# Patient Record
Sex: FEMALE | Race: WHITE | NOT HISPANIC OR LATINO | Employment: UNEMPLOYED | URBAN - METROPOLITAN AREA
[De-identification: names, ages, dates, MRNs, and addresses within clinical notes are randomized per-mention and may not be internally consistent; named-entity substitution may affect disease eponyms.]

---

## 2017-12-21 ENCOUNTER — HOSPITAL ENCOUNTER (EMERGENCY)
Facility: HOSPITAL | Age: 42
Discharge: HOME | End: 2017-12-21
Attending: EMERGENCY MEDICINE | Admitting: EMERGENCY MEDICINE

## 2017-12-21 ENCOUNTER — APPOINTMENT (OUTPATIENT)
Dept: GENERAL RADIOLOGY | Facility: HOSPITAL | Age: 42
End: 2017-12-21

## 2017-12-21 ENCOUNTER — APPOINTMENT (OUTPATIENT)
Dept: CT IMAGING | Facility: HOSPITAL | Age: 42
End: 2017-12-21

## 2017-12-21 VITALS
TEMPERATURE: 100.4 F | BODY MASS INDEX: 18.48 KG/M2 | OXYGEN SATURATION: 100 % | HEIGHT: 66 IN | HEART RATE: 74 BPM | SYSTOLIC BLOOD PRESSURE: 124 MMHG | RESPIRATION RATE: 16 BRPM | WEIGHT: 115 LBS | DIASTOLIC BLOOD PRESSURE: 78 MMHG

## 2017-12-21 DIAGNOSIS — Z85.038 H/O COLON CANCER, STAGE IV: ICD-10-CM

## 2017-12-21 DIAGNOSIS — R50.9 FEBRILE ILLNESS: ICD-10-CM

## 2017-12-21 DIAGNOSIS — R10.30 LOWER ABDOMINAL PAIN: Primary | ICD-10-CM

## 2017-12-21 LAB
ALBUMIN SERPL-MCNC: 4.4 G/DL (ref 3.5–5.2)
ALBUMIN/GLOB SERPL: 1.2 G/DL
ALP SERPL-CCNC: 61 U/L (ref 39–117)
ALT SERPL W P-5'-P-CCNC: 9 U/L (ref 1–33)
ANION GAP SERPL CALCULATED.3IONS-SCNC: 12.2 MMOL/L
AST SERPL-CCNC: 17 U/L (ref 1–32)
BASOPHILS # BLD AUTO: 0.01 10*3/MM3 (ref 0–0.2)
BASOPHILS NFR BLD AUTO: 0.1 % (ref 0–1.5)
BILIRUB SERPL-MCNC: 0.7 MG/DL (ref 0.1–1.2)
BILIRUB UR QL STRIP: NEGATIVE
BUN BLD-MCNC: 14 MG/DL (ref 6–20)
BUN/CREAT SERPL: 20 (ref 7–25)
CALCIUM SPEC-SCNC: 9.6 MG/DL (ref 8.6–10.5)
CHLORIDE SERPL-SCNC: 101 MMOL/L (ref 98–107)
CLARITY UR: CLEAR
CO2 SERPL-SCNC: 24.8 MMOL/L (ref 22–29)
COLOR UR: YELLOW
CREAT BLD-MCNC: 0.7 MG/DL (ref 0.57–1)
D-LACTATE SERPL-SCNC: 1.3 MMOL/L (ref 0.5–2)
DEPRECATED RDW RBC AUTO: 57.9 FL (ref 37–54)
EOSINOPHIL # BLD AUTO: 0.07 10*3/MM3 (ref 0–0.7)
EOSINOPHIL NFR BLD AUTO: 0.8 % (ref 0.3–6.2)
ERYTHROCYTE [DISTWIDTH] IN BLOOD BY AUTOMATED COUNT: 14.6 % (ref 11.7–13)
FLUAV AG NPH QL: NEGATIVE
FLUBV AG NPH QL IA: NEGATIVE
GFR SERPL CREATININE-BSD FRML MDRD: 93 ML/MIN/1.73
GLOBULIN UR ELPH-MCNC: 3.8 GM/DL
GLUCOSE BLD-MCNC: 111 MG/DL (ref 65–99)
GLUCOSE UR STRIP-MCNC: NEGATIVE MG/DL
HCT VFR BLD AUTO: 42.6 % (ref 35.6–45.5)
HGB BLD-MCNC: 14.5 G/DL (ref 11.9–15.5)
HGB UR QL STRIP.AUTO: NEGATIVE
IMM GRANULOCYTES # BLD: 0.04 10*3/MM3 (ref 0–0.03)
IMM GRANULOCYTES NFR BLD: 0.4 % (ref 0–0.5)
KETONES UR QL STRIP: NEGATIVE
LEUKOCYTE ESTERASE UR QL STRIP.AUTO: NEGATIVE
LYMPHOCYTES # BLD AUTO: 0.75 10*3/MM3 (ref 0.9–4.8)
LYMPHOCYTES NFR BLD AUTO: 8.4 % (ref 19.6–45.3)
MACROCYTES BLD QL SMEAR: NORMAL
MCH RBC QN AUTO: 37.1 PG (ref 26.9–32)
MCHC RBC AUTO-ENTMCNC: 34 G/DL (ref 32.4–36.3)
MCV RBC AUTO: 109 FL (ref 80.5–98.2)
MONOCYTES # BLD AUTO: 0.64 10*3/MM3 (ref 0.2–1.2)
MONOCYTES NFR BLD AUTO: 7.1 % (ref 5–12)
NEUTROPHILS # BLD AUTO: 7.46 10*3/MM3 (ref 1.9–8.1)
NEUTROPHILS NFR BLD AUTO: 83.2 % (ref 42.7–76)
NITRITE UR QL STRIP: NEGATIVE
PH UR STRIP.AUTO: 7 [PH] (ref 5–8)
PLAT MORPH BLD: NORMAL
PLATELET # BLD AUTO: 149 10*3/MM3 (ref 140–500)
PMV BLD AUTO: 10.6 FL (ref 6–12)
POTASSIUM BLD-SCNC: 3.5 MMOL/L (ref 3.5–5.2)
PROCALCITONIN SERPL-MCNC: 0.06 NG/ML (ref 0.1–0.25)
PROT SERPL-MCNC: 8.2 G/DL (ref 6–8.5)
PROT UR QL STRIP: NEGATIVE
RBC # BLD AUTO: 3.91 10*6/MM3 (ref 3.9–5.2)
SODIUM BLD-SCNC: 138 MMOL/L (ref 136–145)
SP GR UR STRIP: >=1.03 (ref 1–1.03)
UROBILINOGEN UR QL STRIP: NORMAL
WBC MORPH BLD: NORMAL
WBC NRBC COR # BLD: 8.97 10*3/MM3 (ref 4.5–10.7)

## 2017-12-21 PROCEDURE — 0 IOPAMIDOL 61 % SOLUTION: Performed by: EMERGENCY MEDICINE

## 2017-12-21 PROCEDURE — 85007 BL SMEAR W/DIFF WBC COUNT: CPT | Performed by: EMERGENCY MEDICINE

## 2017-12-21 PROCEDURE — 87040 BLOOD CULTURE FOR BACTERIA: CPT | Performed by: EMERGENCY MEDICINE

## 2017-12-21 PROCEDURE — 87804 INFLUENZA ASSAY W/OPTIC: CPT | Performed by: EMERGENCY MEDICINE

## 2017-12-21 PROCEDURE — 25010000002 HEPARIN FLUSH (PORCINE) 100 UNIT/ML SOLUTION

## 2017-12-21 PROCEDURE — 85025 COMPLETE CBC W/AUTO DIFF WBC: CPT | Performed by: EMERGENCY MEDICINE

## 2017-12-21 PROCEDURE — 81003 URINALYSIS AUTO W/O SCOPE: CPT | Performed by: EMERGENCY MEDICINE

## 2017-12-21 PROCEDURE — 74177 CT ABD & PELVIS W/CONTRAST: CPT

## 2017-12-21 PROCEDURE — 96361 HYDRATE IV INFUSION ADD-ON: CPT

## 2017-12-21 PROCEDURE — 84145 PROCALCITONIN (PCT): CPT | Performed by: EMERGENCY MEDICINE

## 2017-12-21 PROCEDURE — 83605 ASSAY OF LACTIC ACID: CPT | Performed by: EMERGENCY MEDICINE

## 2017-12-21 PROCEDURE — 71020 HC CHEST PA AND LATERAL: CPT

## 2017-12-21 PROCEDURE — 80053 COMPREHEN METABOLIC PANEL: CPT | Performed by: EMERGENCY MEDICINE

## 2017-12-21 PROCEDURE — 99284 EMERGENCY DEPT VISIT MOD MDM: CPT

## 2017-12-21 PROCEDURE — 96360 HYDRATION IV INFUSION INIT: CPT

## 2017-12-21 RX ORDER — HYDROCODONE BITARTRATE AND ACETAMINOPHEN 5; 325 MG/1; MG/1
1 TABLET ORAL EVERY 6 HOURS PRN
Qty: 15 TABLET | Refills: 0 | Status: SHIPPED | OUTPATIENT
Start: 2017-12-21 | End: 2019-03-29 | Stop reason: HOSPADM

## 2017-12-21 RX ORDER — SODIUM CHLORIDE 0.9 % (FLUSH) 0.9 %
10 SYRINGE (ML) INJECTION AS NEEDED
Status: DISCONTINUED | OUTPATIENT
Start: 2017-12-21 | End: 2017-12-22 | Stop reason: HOSPADM

## 2017-12-21 RX ADMIN — SODIUM CHLORIDE 1000 ML: 9 INJECTION, SOLUTION INTRAVENOUS at 19:57

## 2017-12-21 RX ADMIN — IOPAMIDOL 85 ML: 612 INJECTION, SOLUTION INTRAVENOUS at 20:43

## 2017-12-21 RX ADMIN — Medication 500 UNITS: at 23:27

## 2017-12-21 NOTE — ED PROVIDER NOTES
" EMERGENCY DEPARTMENT ENCOUNTER    CHIEF COMPLAINT  Chief Complaint: R Flank pain  History given by: Pt  History limited by: Nothing  Room Number: 15/15  PMD: Jase Warner      HPI:  Pt is a 42 y.o. female,  with known stage 4 Colorectal cancer with mets to lungs and liver, who presents complaining of sharp R flank pain that began in August.  Pt reports a pain level of 10/10 but states that applying ice to the area helps to alleviate the pain.  Pt reports associated diaphoresis, nausea, superpubic abdominal pain (crampy)  Pt denies urinary frequency, urinary urgency, diarrhea, rashes.  Pt is febrile, during the visit, 100.4.  Pt has had 47 chemo treatments with last one on last Friday.  Oncologist is Dr. Garvey at [facility].   Pt has a power port.    Duration:  4 months  Onset: Gradual  Timing: Interittent  Location: R flank  Radiation: None  Quality: \"Sharp\"  Intensity/Severity: Moderate  Progression: Unchanged  Associated Symptoms: Pt reports associated diaphoresis, nausea, superpubic abdominal pain (crampy)   Aggravating Factors: None  Alleviating Factors: Ice to the area  Previous Episodes: None  Treatment before arrival: None    PAST MEDICAL HISTORY  Active Ambulatory Problems     Diagnosis Date Noted   • No Active Ambulatory Problems     Resolved Ambulatory Problems     Diagnosis Date Noted   • No Resolved Ambulatory Problems     Past Medical History:   Diagnosis Date   • Cancer        PAST SURGICAL HISTORY  Past Surgical History:   Procedure Laterality Date   • COLON RESECTION      COLORECTAL RESECTION       FAMILY HISTORY  History reviewed. No pertinent family history.    SOCIAL HISTORY  Social History     Social History   • Marital status:      Spouse name: N/A   • Number of children: N/A   • Years of education: N/A     Occupational History   • Not on file.     Social History Main Topics   • Smoking status: Never Smoker   • Smokeless tobacco: Not on file   • Alcohol use No   • Drug use: No   • " Sexual activity: Not on file     Other Topics Concern   • Not on file     Social History Narrative   • No narrative on file       ALLERGIES  Review of patient's allergies indicates no known allergies.    REVIEW OF SYSTEMS  Review of Systems   Constitutional: Positive for diaphoresis. Negative for fever.   HENT: Negative for sore throat.    Eyes: Negative.    Respiratory: Negative for cough and shortness of breath.    Cardiovascular: Negative for chest pain.   Gastrointestinal: Positive for abdominal pain and nausea. Negative for diarrhea and vomiting.   Genitourinary: Negative for dysuria.   Musculoskeletal: Negative for neck pain.   Skin: Negative for rash.   Allergic/Immunologic: Negative.    Neurological: Negative for weakness, numbness and headaches.   Hematological: Negative.    Psychiatric/Behavioral: Negative.    All other systems reviewed and are negative.      PHYSICAL EXAM  ED Triage Vitals   Temp Heart Rate Resp BP SpO2   12/21/17 1727 12/21/17 1727 12/21/17 1727 12/21/17 1727 12/21/17 1727   100.4 °F (38 °C) 77 16 107/76 100 %      Temp src Heart Rate Source Patient Position BP Location FiO2 (%)   12/21/17 1727 -- -- -- --   Tympanic           Physical Exam   Constitutional: She is oriented to person, place, and time and well-developed, well-nourished, and in no distress. No distress.   HENT:   Head: Normocephalic and atraumatic.   Mouth/Throat: Mucous membranes are normal.   Eyes: EOM are normal. Pupils are equal, round, and reactive to light.   Neck: Normal range of motion. Neck supple.   Cardiovascular: Normal rate, regular rhythm and normal heart sounds.    No murmur heard.  Pulmonary/Chest: Effort normal and breath sounds normal. No respiratory distress.   Abdominal: Soft. Bowel sounds are normal. There is tenderness in the suprapubic area. There is CVA tenderness (right). There is no rebound.   Vertical midline scar.     Musculoskeletal: Normal range of motion. She exhibits no edema.   Mild  tenderness of right lower back but not the spine.     Neurological: She is alert and oriented to person, place, and time. She has normal sensation and normal strength.   Skin: Skin is warm and dry. No rash noted.   Psychiatric: Mood and affect normal.   Nursing note and vitals reviewed.      LAB RESULTS  Lab Results (last 24 hours)     Procedure Component Value Units Date/Time    CBC & Differential [[id number]] Collected:  12/21/17 1857    Specimen:  Blood Updated:  12/21/17 1943    Narrative:       The following orders were created for panel order CBC & Differential.  Procedure                               Abnormality         Status                     ---------                               -----------         ------                     Scan Slide[[id number]]                                       Final result               CBC Auto Differential[[id number]]        Abnormal            Final result                 Please view results for these tests on the individual orders.    Comprehensive Metabolic Panel [[id number]]  (Abnormal) Collected:  12/21/17 1857    Specimen:  Blood Updated:  12/21/17 1942     Glucose 111 (H) mg/dL      BUN 14 mg/dL      Creatinine 0.70 mg/dL      Sodium 138 mmol/L      Potassium 3.5 mmol/L      Chloride 101 mmol/L      CO2 24.8 mmol/L      Calcium 9.6 mg/dL      Total Protein 8.2 g/dL      Albumin 4.40 g/dL      ALT (SGPT) 9 U/L      AST (SGOT) 17 U/L      Alkaline Phosphatase 61 U/L      Total Bilirubin 0.7 mg/dL      eGFR Non African Amer 93 mL/min/1.73      Globulin 3.8 gm/dL      A/G Ratio 1.2 g/dL      BUN/Creatinine Ratio 20.0     Anion Gap 12.2 mmol/L     Lactic Acid, Plasma [[id number]]  (Normal) Collected:  12/21/17 1857    Specimen:  Blood Updated:  12/21/17 1931     Lactate 1.3 mmol/L     Blood Culture - Blood, [[id number]] Collected:  12/21/17 1857    Specimen:  Blood from Arm, Right Updated:  12/21/17 1914    Procalcitonin [[id number]]  (Abnormal) Collected:  12/21/17 1857     "Specimen:  Blood Updated:  12/21/17 1949     Procalcitonin 0.06 (L) ng/mL     Narrative:       As a Marker for Sepsis (Non-Neonates):   1. <0.5 ng/mL represents a low risk of severe sepsis and/or septic shock.  1. >2 ng/mL represents a high risk of severe sepsis and/or septic shock.    As a Marker for Lower Respiratory Tract Infections that require antibiotic therapy:  PCT on Admission     Antibiotic Therapy             6-12 Hrs later  > 0.5                Strongly Recommended            >0.25 - <0.5         Recommended  0.1 - 0.25           Discouraged                   Remeasure/reassess PCT  <0.1                 Strongly Discouraged          Remeasure/reassess PCT      As 28 day mortality risk marker: \"Change in Procalcitonin Result\" (> 80 % or <=80 %) if Day 0 (or Day 1) and Day 4 values are available. Refer to http://www.oLyfepct-calculator.com/   Change in PCT <=80 %   A decrease of PCT levels below or equal to 80 % defines a positive change in PCT test result representing a higher risk for 28-day all-cause mortality of patients diagnosed with severe sepsis or septic shock.  Change in PCT > 80 %   A decrease of PCT levels of more than 80 % defines a negative change in PCT result representing a lower risk for 28-day all-cause mortality of patients diagnosed with severe sepsis or septic shock.                CBC Auto Differential [[id number]]  (Abnormal) Collected:  12/21/17 1857    Specimen:  Blood Updated:  12/21/17 1943     WBC 8.97 10*3/mm3      RBC 3.91 10*6/mm3      Hemoglobin 14.5 g/dL      Hematocrit 42.6 %      .0 (H) fL      MCH 37.1 (H) pg      MCHC 34.0 g/dL      RDW 14.6 (H) %      RDW-SD 57.9 (H) fl      MPV 10.6 fL      Platelets 149 10*3/mm3      Neutrophil % 83.2 (H) %      Lymphocyte % 8.4 (L) %      Monocyte % 7.1 %      Eosinophil % 0.8 %      Basophil % 0.1 %      Immature Grans % 0.4 %      Neutrophils, Absolute 7.46 10*3/mm3      Lymphocytes, Absolute 0.75 (L) 10*3/mm3      " Monocytes, Absolute 0.64 10*3/mm3      Eosinophils, Absolute 0.07 10*3/mm3      Basophils, Absolute 0.01 10*3/mm3      Immature Grans, Absolute 0.04 (H) 10*3/mm3     Scan Slide [[id number]] Collected:  12/21/17 1857    Specimen:  Blood Updated:  12/21/17 1943     Macrocytes Mod/2+     WBC Morphology Normal     Platelet Morphology Normal    Blood Culture - Blood, [[id number]] Collected:  12/21/17 2001    Specimen:  Blood from Arm, Left Updated:  12/21/17 2022    Urinalysis With / Culture If Indicated - Urine, Clean Catch [[id number]]  (Normal) Collected:  12/21/17 2210    Specimen:  Urine from Urine, Clean Catch Updated:  12/21/17 2228     Color, UA Yellow     Appearance, UA Clear     pH, UA 7.0     Specific Gravity, UA >=1.030     Glucose, UA Negative     Ketones, UA Negative     Bilirubin, UA Negative     Blood, UA Negative     Protein, UA Negative     Leuk Esterase, UA Negative     Nitrite, UA Negative     Urobilinogen, UA 0.2 E.U./dL    Narrative:       Urine microscopic not indicated.    Influenza Antigen, Rapid - Swab, Nasopharynx [[id number]]  (Normal) Collected:  12/21/17 2213    Specimen:  Swab from Nasopharynx Updated:  12/21/17 2250     Influenza A Ag, EIA Negative     Influenza B Ag, EIA Negative          I ordered the above labs and reviewed the results    RADIOLOGY  CT Abdomen Pelvis With Contrast   Preliminary Result   1.  No definite acute abdominal pathology, no obstructive uropathy or   inflammatory bowel disease.    2.  Small amount of fluid in the pelvis with enhancing walls,   differential diagnoses includes an abscess or neoplastic process given   patient's history of colon cancer.   3.  Numerous spiculated nodules in both lung bases in keeping with known   metastatic disease to the lungs. Infiltrates are possible but appear   less likely.              XR Chest 2 View   Final Result       PA AND LATERAL CHEST     CLINICAL HISTORY: 42-year-old female with history of widely metastatic  colon carcinoma  involving the liver and lungs. Dyspnea. Fever.     No previous chest imaging is available for comparison.     There are innumerable ill-defined nodular opacities throughout both  lungs consistent with extensive metastatic disease. Patchy superimposed  pneumonia cannot be excluded without a previous chest x-ray to compare.  No focal areas of dense consolidation are identified. There are no  pleural effusions. The cardiomediastinal silhouette is unremarkable. A  Mediport device is in place in satisfactory position within the right  internal jugular vein.     This report was finalized on 12/21/2017 6:41 PM by Dr. Efrain Pineda MD.    I ordered the above noted radiological studies. Interpreted by radiologist. Reviewed by me in PACS.       PROCEDURES  Procedures      PROGRESS AND CONSULTS  ED Course   1814  Ordered blood work, and Urinalysis with culture for further evaluation.      1907   Ordered CT abdomen/ pelvis with contrast for further evaluation.     2202  Rechecked the patient and she is resting comfortably. Discussed pertinent lab and imaging results.. Discussed the plan to assess for UTI.     2300  Discussed the plan to discharge with pain medication.  Instructed Pt to follow up with oncologist. Patient agrees with plan and all questions were addressed.         MEDICAL DECISION MAKING  Results were reviewed/discussed with the patient and they were also made aware of online access. Pt also made aware that some labs, such as cultures, will not be resulted during ER visit and follow up with PMD is necessary.     MDM  Number of Diagnoses or Management Options  Febrile illness:   H/O colon cancer, stage IV:   Lower abdominal pain:      Amount and/or Complexity of Data Reviewed  Clinical lab tests: ordered and reviewed (Pt is negative for Influenza)  Tests in the radiology section of CPT®: ordered and reviewed (CT shows nothing acute.  XR shows nothing acute. )    Patient Progress  Patient progress: stable          DIAGNOSIS  Final diagnoses:   Lower abdominal pain   H/O colon cancer, stage IV   Febrile illness       DISPOSITION  DISCHARGE    Patient discharged in stable condition.    Reviewed implications of results, diagnosis, meds, responsibility to follow up, warning signs and symptoms of possible worsening, potential complications and reasons to return to ER.    Patient/Family voiced understanding of above instructions.    Discussed plan for discharge, as there is no emergent indication for admission.  Pt/family is agreeable and understands need for follow up and repeat testing.  Pt is aware that discharge does not mean that nothing is wrong but it indicates no emergency is present that requires admission and they must continue care with follow-up as given below or physician of their choice.     FOLLOW-UP  Jase Warner  [address] [facility] [address]  [phone]    Schedule an appointment as soon as possible for a visit           Medication List      New Prescriptions          HYDROcodone-acetaminophen 5-325 MG per tablet   Commonly known as:  NORCO   Take 1 tablet by mouth Every 6 (Six) Hours As Needed for Moderate Pain .               Latest Documented Vital Signs:  As of 11:54 PM  BP- 124/78 HR- 74 Temp- 100.4 °F (38 °C) (Tympanic) O2 sat- 100%    --  Documentation assistance provided by luis e Gillette for Dr. Ryan.  Information recorded by the scribe was done at my direction and has been verified and validated by me.             Lesia Gillette  12/21/17 [id number]       Ruddy Ryan MD  12/21/17 9781

## 2017-12-22 NOTE — DISCHARGE INSTRUCTIONS
Use over the counter Tylenol or Ibuprofen as needed for pain.  Use Norco for breakthrough pain.  Do follow up with your oncologist.  Return to the ED if symptoms worsen with fever

## 2017-12-26 LAB
BACTERIA SPEC AEROBE CULT: NORMAL
BACTERIA SPEC AEROBE CULT: NORMAL

## 2018-05-03 ENCOUNTER — HOSPITAL ENCOUNTER (EMERGENCY)
Facility: HOSPITAL | Age: 43
Discharge: HOME | End: 2018-05-04
Attending: EMERGENCY MEDICINE | Admitting: EMERGENCY MEDICINE

## 2018-05-03 DIAGNOSIS — R11.2 NON-INTRACTABLE VOMITING WITH NAUSEA, UNSPECIFIED VOMITING TYPE: ICD-10-CM

## 2018-05-03 DIAGNOSIS — C18.9 METASTATIC COLON CANCER IN FEMALE: ICD-10-CM

## 2018-05-03 DIAGNOSIS — M54.50 ACUTE MIDLINE LOW BACK PAIN WITHOUT SCIATICA: Primary | ICD-10-CM

## 2018-05-03 PROCEDURE — 99284 EMERGENCY DEPT VISIT MOD MDM: CPT

## 2018-05-04 ENCOUNTER — APPOINTMENT (OUTPATIENT)
Dept: CT IMAGING | Facility: HOSPITAL | Age: 43
End: 2018-05-04

## 2018-05-04 VITALS
TEMPERATURE: 98 F | SYSTOLIC BLOOD PRESSURE: 103 MMHG | DIASTOLIC BLOOD PRESSURE: 78 MMHG | BODY MASS INDEX: 17.27 KG/M2 | HEIGHT: 67 IN | RESPIRATION RATE: 16 BRPM | WEIGHT: 110 LBS | OXYGEN SATURATION: 100 % | HEART RATE: 81 BPM

## 2018-05-04 LAB
ALBUMIN SERPL-MCNC: 4.3 G/DL (ref 3.5–5.2)
ALBUMIN/GLOB SERPL: 1.2 G/DL
ALP SERPL-CCNC: 106 U/L (ref 39–117)
ALT SERPL W P-5'-P-CCNC: 11 U/L (ref 1–33)
ANION GAP SERPL CALCULATED.3IONS-SCNC: 17.5 MMOL/L
AST SERPL-CCNC: 21 U/L (ref 1–32)
BACTERIA UR QL AUTO: ABNORMAL /HPF
BASOPHILS # BLD AUTO: 0.03 10*3/MM3 (ref 0–0.2)
BASOPHILS NFR BLD AUTO: 0.2 % (ref 0–1.5)
BILIRUB SERPL-MCNC: 0.9 MG/DL (ref 0.1–1.2)
BILIRUB UR QL STRIP: NEGATIVE
BUN BLD-MCNC: 25 MG/DL (ref 6–20)
BUN/CREAT SERPL: 41 (ref 7–25)
CALCIUM SPEC-SCNC: 9.9 MG/DL (ref 8.6–10.5)
CHLORIDE SERPL-SCNC: 95 MMOL/L (ref 98–107)
CLARITY UR: CLEAR
CO2 SERPL-SCNC: 22.5 MMOL/L (ref 22–29)
COLOR UR: YELLOW
CREAT BLD-MCNC: 0.61 MG/DL (ref 0.57–1)
DEPRECATED RDW RBC AUTO: 45.2 FL (ref 37–54)
EOSINOPHIL # BLD AUTO: 0.04 10*3/MM3 (ref 0–0.7)
EOSINOPHIL NFR BLD AUTO: 0.2 % (ref 0.3–6.2)
ERYTHROCYTE [DISTWIDTH] IN BLOOD BY AUTOMATED COUNT: 12.3 % (ref 11.7–13)
GFR SERPL CREATININE-BSD FRML MDRD: 108 ML/MIN/1.73
GLOBULIN UR ELPH-MCNC: 3.5 GM/DL
GLUCOSE BLD-MCNC: 113 MG/DL (ref 65–99)
GLUCOSE UR STRIP-MCNC: NEGATIVE MG/DL
HCG SERPL QL: NEGATIVE
HCT VFR BLD AUTO: 47.7 % (ref 35.6–45.5)
HGB BLD-MCNC: 16.6 G/DL (ref 11.9–15.5)
HGB UR QL STRIP.AUTO: NEGATIVE
HYALINE CASTS UR QL AUTO: ABNORMAL /LPF
IMM GRANULOCYTES # BLD: 0.05 10*3/MM3 (ref 0–0.03)
IMM GRANULOCYTES NFR BLD: 0.3 % (ref 0–0.5)
KETONES UR QL STRIP: ABNORMAL
LEUKOCYTE ESTERASE UR QL STRIP.AUTO: ABNORMAL
LIPASE SERPL-CCNC: 27 U/L (ref 13–60)
LYMPHOCYTES # BLD AUTO: 1.34 10*3/MM3 (ref 0.9–4.8)
LYMPHOCYTES NFR BLD AUTO: 7.8 % (ref 19.6–45.3)
MCH RBC QN AUTO: 34.8 PG (ref 26.9–32)
MCHC RBC AUTO-ENTMCNC: 34.8 G/DL (ref 32.4–36.3)
MCV RBC AUTO: 100 FL (ref 80.5–98.2)
MONOCYTES # BLD AUTO: 1.01 10*3/MM3 (ref 0.2–1.2)
MONOCYTES NFR BLD AUTO: 5.9 % (ref 5–12)
NEUTROPHILS # BLD AUTO: 14.79 10*3/MM3 (ref 1.9–8.1)
NEUTROPHILS NFR BLD AUTO: 85.9 % (ref 42.7–76)
NITRITE UR QL STRIP: NEGATIVE
PH UR STRIP.AUTO: <=5 [PH] (ref 5–8)
PLATELET # BLD AUTO: 221 10*3/MM3 (ref 140–500)
PMV BLD AUTO: 9.8 FL (ref 6–12)
POTASSIUM BLD-SCNC: 3.9 MMOL/L (ref 3.5–5.2)
PROT SERPL-MCNC: 7.8 G/DL (ref 6–8.5)
PROT UR QL STRIP: ABNORMAL
RBC # BLD AUTO: 4.77 10*6/MM3 (ref 3.9–5.2)
RBC # UR: ABNORMAL /HPF
REF LAB TEST METHOD: ABNORMAL
SODIUM BLD-SCNC: 135 MMOL/L (ref 136–145)
SP GR UR STRIP: >=1.03 (ref 1–1.03)
SQUAMOUS #/AREA URNS HPF: ABNORMAL /HPF
UROBILINOGEN UR QL STRIP: ABNORMAL
WBC NRBC COR # BLD: 17.21 10*3/MM3 (ref 4.5–10.7)
WBC UR QL AUTO: ABNORMAL /HPF
WHOLE BLOOD HOLD SPECIMEN: NORMAL

## 2018-05-04 PROCEDURE — 84703 CHORIONIC GONADOTROPIN ASSAY: CPT | Performed by: EMERGENCY MEDICINE

## 2018-05-04 PROCEDURE — 80053 COMPREHEN METABOLIC PANEL: CPT | Performed by: EMERGENCY MEDICINE

## 2018-05-04 PROCEDURE — 96375 TX/PRO/DX INJ NEW DRUG ADDON: CPT

## 2018-05-04 PROCEDURE — 85025 COMPLETE CBC W/AUTO DIFF WBC: CPT | Performed by: EMERGENCY MEDICINE

## 2018-05-04 PROCEDURE — 25010000002 ONDANSETRON PER 1 MG: Performed by: EMERGENCY MEDICINE

## 2018-05-04 PROCEDURE — 83690 ASSAY OF LIPASE: CPT | Performed by: EMERGENCY MEDICINE

## 2018-05-04 PROCEDURE — 96374 THER/PROPH/DIAG INJ IV PUSH: CPT

## 2018-05-04 PROCEDURE — 25010000002 IOPAMIDOL 61 % SOLUTION: Performed by: EMERGENCY MEDICINE

## 2018-05-04 PROCEDURE — 96361 HYDRATE IV INFUSION ADD-ON: CPT

## 2018-05-04 PROCEDURE — 74177 CT ABD & PELVIS W/CONTRAST: CPT

## 2018-05-04 PROCEDURE — 81001 URINALYSIS AUTO W/SCOPE: CPT | Performed by: EMERGENCY MEDICINE

## 2018-05-04 PROCEDURE — 96376 TX/PRO/DX INJ SAME DRUG ADON: CPT

## 2018-05-04 PROCEDURE — 25010000002 HYDROMORPHONE PER 4 MG: Performed by: EMERGENCY MEDICINE

## 2018-05-04 PROCEDURE — 25010000002 HEPARIN FLUSH (PORCINE) 100 UNIT/ML SOLUTION: Performed by: EMERGENCY MEDICINE

## 2018-05-04 RX ORDER — SODIUM CHLORIDE 0.9 % (FLUSH) 0.9 %
10 SYRINGE (ML) INJECTION AS NEEDED
Status: DISCONTINUED | OUTPATIENT
Start: 2018-05-04 | End: 2018-05-04 | Stop reason: HOSPADM

## 2018-05-04 RX ORDER — HYDROMORPHONE HYDROCHLORIDE 2 MG/1
1 TABLET ORAL EVERY 6 HOURS PRN
Qty: 6 TABLET | Refills: 0 | Status: ON HOLD | OUTPATIENT
Start: 2018-05-04 | End: 2019-06-13

## 2018-05-04 RX ORDER — ONDANSETRON 2 MG/ML
4 INJECTION INTRAMUSCULAR; INTRAVENOUS ONCE
Status: COMPLETED | OUTPATIENT
Start: 2018-05-04 | End: 2018-05-04

## 2018-05-04 RX ORDER — HYDROMORPHONE HCL 110MG/55ML
1 PATIENT CONTROLLED ANALGESIA SYRINGE INTRAVENOUS ONCE
Status: COMPLETED | OUTPATIENT
Start: 2018-05-04 | End: 2018-05-04

## 2018-05-04 RX ORDER — ONDANSETRON 4 MG/1
4 TABLET, FILM COATED ORAL EVERY 6 HOURS PRN
Qty: 12 TABLET | Refills: 0 | Status: ON HOLD | OUTPATIENT
Start: 2018-05-04 | End: 2019-06-13

## 2018-05-04 RX ADMIN — IOPAMIDOL 85 ML: 612 INJECTION, SOLUTION INTRAVENOUS at 01:06

## 2018-05-04 RX ADMIN — HYDROMORPHONE HYDROCHLORIDE 1 MG: 2 INJECTION INTRAMUSCULAR; INTRAVENOUS; SUBCUTANEOUS at 00:30

## 2018-05-04 RX ADMIN — HYDROMORPHONE HYDROCHLORIDE 1 MG: 2 INJECTION INTRAMUSCULAR; INTRAVENOUS; SUBCUTANEOUS at 02:26

## 2018-05-04 RX ADMIN — SODIUM CHLORIDE 1000 ML: 9 INJECTION, SOLUTION INTRAVENOUS at 00:30

## 2018-05-04 RX ADMIN — IOPAMIDOL 1 ML: 612 INJECTION, SOLUTION INTRAVENOUS at 01:43

## 2018-05-04 RX ADMIN — Medication 500 UNITS: at 02:51

## 2018-05-04 RX ADMIN — ONDANSETRON 4 MG: 2 INJECTION INTRAMUSCULAR; INTRAVENOUS at 00:30

## 2018-05-04 NOTE — DISCHARGE INSTRUCTIONS
Talk to your primary care doctor and/or oncologist about long-term pain medication.  Take medications as prescribed.  You may also take ibuprofen 800 mg every 6 hours as needed for pain.  Return to the emergency department for worsening pain, persistent vomiting, fever, or other concern.  Follow-up with your primary care doctor in the next several days.  Do not take hydrocodone while taking Dilaudid/hydromorphone.

## 2018-05-04 NOTE — ED PROVIDER NOTES
" EMERGENCY DEPARTMENT ENCOUNTER    CHIEF COMPLAINT  Chief Complaint: Back pain   History given by: Pt and   History limited by: Nothing  Room Number: 40/40  PMD: Jase Warner      HPI:  Pt is a 43 y.o. female with history of metastatic colorectal cancer, who presents complaining of intermittent lower back pain that rates 10/10 in intensity which began a year ago and worsened last week.   Pt reports associated vomiting secondary to pain that began one week ago and intensified today.  Pt states that she was diagnosed with bulging disks last week via MRI  and received valium and steroids last week.  Pt reports that the pain radiates to both legs but does not reach her feet. Spouse reports that her doctors do not believe that the lower back pain is due to her cancer. Pt denies bowel or bladder problems, numbness or weakness of legs.     Pt was last seen in ED 12/2017 for lower abdominal pain.  CT of abdomen pelvis showed numerous nodules in both lungs. 4/26/18 Pt had XR of L-spine that were normal. MRI of L spine done on 4/27/18 signal abnormality of left side of L2 and L3 most consistent with mets. Some enhancement of left S1 nerve and some degenerative changes of the spine. There was also an L4-L5 posterior disk bulge.      Duration:  One week  Onset: Gradual  Timing: Constant  Location: Lower back  Radiation: None  Quality: \"pain\"  Intensity/Severity: Moderate  Progression: Unchanged  Associated Symptoms: Pt reports associated vomiting secondary to pain   Aggravating Factors: Movement  Alleviating Factors: None  Previous Episodes: None  Treatment before arrival: Valium and steroids last week.    PAST MEDICAL HISTORY  Active Ambulatory Problems     Diagnosis Date Noted   • No Active Ambulatory Problems     Resolved Ambulatory Problems     Diagnosis Date Noted   • No Resolved Ambulatory Problems     Past Medical History:   Diagnosis Date   • Cancer        PAST SURGICAL HISTORY  Past Surgical History:   Procedure " Laterality Date   • COLON RESECTION      COLORECTAL RESECTION       FAMILY HISTORY  History reviewed. No pertinent family history.    SOCIAL HISTORY  Social History     Social History   • Marital status:      Spouse name: N/A   • Number of children: N/A   • Years of education: N/A     Occupational History   • Not on file.     Social History Main Topics   • Smoking status: Never Smoker   • Smokeless tobacco: Never Used   • Alcohol use No   • Drug use: No   • Sexual activity: Not on file     Other Topics Concern   • Not on file     Social History Narrative   • No narrative on file       ALLERGIES  Review of patient's allergies indicates no known allergies.    REVIEW OF SYSTEMS  Review of Systems   Constitutional: Negative for fever.   HENT: Negative for sore throat.    Eyes: Negative.    Respiratory: Negative for cough and shortness of breath.    Cardiovascular: Negative for chest pain.   Gastrointestinal: Positive for nausea and vomiting. Negative for abdominal pain and diarrhea.   Genitourinary: Negative for dysuria.   Musculoskeletal: Positive for back pain. Negative for neck pain.   Skin: Negative for rash.   Allergic/Immunologic: Negative.    Neurological: Negative for weakness, numbness and headaches.   Hematological: Negative.    Psychiatric/Behavioral: Negative.    All other systems reviewed and are negative.      PHYSICAL EXAM  ED Triage Vitals   Temp Heart Rate Resp BP SpO2   05/03/18 2336 05/03/18 2336 05/03/18 2336 05/03/18 2340 05/03/18 2336   97.8 °F (36.6 °C) 112 18 128/81 97 %      Temp src Heart Rate Source Patient Position BP Location FiO2 (%)   05/03/18 2336 05/03/18 2336 -- -- --   Tympanic Monitor          Physical Exam   Constitutional: She is oriented to person, place, and time and well-developed, well-nourished, and in no distress. She appears cachectic. No distress.   HENT:   Head: Normocephalic and atraumatic.   Mouth/Throat: Mucous membranes are dry.   Eyes: EOM are normal. Pupils are  equal, round, and reactive to light.   Neck: Normal range of motion. Neck supple.   Cardiovascular: Regular rhythm and normal heart sounds.  Tachycardia present.    Pulmonary/Chest: Effort normal and breath sounds normal. No respiratory distress.   Abdominal: Soft. There is tenderness (mild diffuse). There is no rebound and no guarding.   Musculoskeletal: Normal range of motion. She exhibits no edema.   Tenderness lower L spine.    Neurological: She is alert and oriented to person, place, and time. She has normal sensation and normal strength.   No saddle anesthesia    Skin: Skin is warm and dry. No rash noted.   Psychiatric: Mood and affect normal.   Nursing note and vitals reviewed.      LAB RESULTS  Lab Results (last 24 hours)     Procedure Component Value Units Date/Time    CBC & Differential [[id number]] Collected:  05/04/18 0023    Specimen:  Blood Updated:  05/04/18 0037    Narrative:       The following orders were created for panel order CBC & Differential.  Procedure                               Abnormality         Status                     ---------                               -----------         ------                     CBC Auto Differential[[id number]]        Abnormal            Final result                 Please view results for these tests on the individual orders.    Comprehensive Metabolic Panel [[id number]]  (Abnormal) Collected:  05/04/18 0023    Specimen:  Blood Updated:  05/04/18 0047     Glucose 113 (H) mg/dL      BUN 25 (H) mg/dL      Creatinine 0.61 mg/dL      Sodium 135 (L) mmol/L      Potassium 3.9 mmol/L      Chloride 95 (L) mmol/L      CO2 22.5 mmol/L      Calcium 9.9 mg/dL      Total Protein 7.8 g/dL      Albumin 4.30 g/dL      ALT (SGPT) 11 U/L      AST (SGOT) 21 U/L      Alkaline Phosphatase 106 U/L      Total Bilirubin 0.9 mg/dL      eGFR Non African Amer 108 mL/min/1.73      Globulin 3.5 gm/dL      A/G Ratio 1.2 g/dL      BUN/Creatinine Ratio 41.0 (H)     Anion Gap 17.5 mmol/L      Lipase [[id number]]  (Normal) Collected:  05/04/18 0023    Specimen:  Blood Updated:  05/04/18 0047     Lipase 27 U/L     hCG, Serum, Qualitative [[id number]]  (Normal) Collected:  05/04/18 0023    Specimen:  Blood Updated:  05/04/18 0046     HCG Qualitative Negative    CBC Auto Differential [[id number]]  (Abnormal) Collected:  05/04/18 0023    Specimen:  Blood Updated:  05/04/18 0037     WBC 17.21 (H) 10*3/mm3      RBC 4.77 10*6/mm3      Hemoglobin 16.6 (H) g/dL      Hematocrit 47.7 (H) %      .0 (H) fL      MCH 34.8 (H) pg      MCHC 34.8 g/dL      RDW 12.3 %      RDW-SD 45.2 fl      MPV 9.8 fL      Platelets 221 10*3/mm3      Neutrophil % 85.9 (H) %      Lymphocyte % 7.8 (L) %      Monocyte % 5.9 %      Eosinophil % 0.2 (L) %      Basophil % 0.2 %      Immature Grans % 0.3 %      Neutrophils, Absolute 14.79 (H) 10*3/mm3      Lymphocytes, Absolute 1.34 10*3/mm3      Monocytes, Absolute 1.01 10*3/mm3      Eosinophils, Absolute 0.04 10*3/mm3      Basophils, Absolute 0.03 10*3/mm3      Immature Grans, Absolute 0.05 (H) 10*3/mm3     Urinalysis With / Microscopic If Indicated - Urine, Clean Catch [[id number]]  (Abnormal) Collected:  05/04/18 0159    Specimen:  Urine from Urine, Clean Catch Updated:  05/04/18 0211     Color, UA Yellow     Appearance, UA Clear     pH, UA <=5.0     Specific Gravity, UA >=1.030     Glucose, UA Negative     Ketones, UA 40 mg/dL (2+) (A)     Bilirubin, UA Negative     Blood, UA Negative     Protein, UA Trace (A)     Leuk Esterase, UA Trace (A)     Nitrite, UA Negative     Urobilinogen, UA 1.0 E.U./dL    Urinalysis, Microscopic Only - Urine, Clean Catch [[id number]]  (Abnormal) Collected:  05/04/18 0159    Specimen:  Urine from Urine, Clean Catch Updated:  05/04/18 0211     RBC, UA 3-5 (A) /HPF      WBC, UA 13-20 (A) /HPF      Bacteria, UA None Seen /HPF      Squamous Epithelial Cells, UA 3-6 (A) /HPF      Hyaline Casts, UA 13-20 /LPF      Methodology Automated Microscopy          I  ordered the above labs and reviewed the results    RADIOLOGY  CT Abdomen Pelvis With Contrast   Final Result   IMPRESSION :    1. Progression of pulmonary metastatic disease.   2. Stable small liver lesions, likely metastatic as well.   3. Stable postsurgical changes near the distal rectum with small   enhancing fluid collection centered to the right of midline.           This report was finalized on 5/4/2018 1:56 AM by Tony Huynh M.D.               I ordered the above noted radiological studies. Interpreted by radiologist.     PROCEDURES  Procedures      PROGRESS AND CONSULTS  ED Course     Medications   sodium chloride 0.9 % flush 10 mL (not administered)   heparin flush (porcine) 100 UNIT/ML injection 500 Units (not administered)   HYDROmorphone (DILAUDID) injection 1 mg (1 mg Intravenous Given 5/4/18 0030)   ondansetron (ZOFRAN) injection 4 mg (4 mg Intravenous Given 5/4/18 0030)   sodium chloride 0.9 % bolus 1,000 mL (0 mL Intravenous Stopped 5/4/18 0159)   iopamidol (ISOVUE-300) 61 % injection 100 mL (85 mL Intravenous Given 5/4/18 0106)   iopamidol (ISOVUE-300) 61 % injection 100 mL (1 mL Intravenous Given 5/4/18 0143)   HYDROmorphone (DILAUDID) injection 1 mg (1 mg Intravenous Given 5/4/18 0226)     0214  WBC is 17.21.   Bun/Creatinine 41.0.  CT shows no acute spinal or abdominal process.  Rechecked Pt and she is feeling more comfortable. Discussed lab and CT study findings.  Pt reports that oxycodone does not work to relieve her pain.  Discussed the plan to discharge with a few dilaudid pills to last until she can speak to her oncologist.      0222  Savage was reviewed.      MEDICAL DECISION MAKING  Results were reviewed/discussed with the patient and they were also made aware of online access. Pt also made aware that some labs, such as cultures, will not be resulted during ER visit and follow up with PMD is necessary.     MDM  Number of Diagnoses or Management Options  Acute midline low back pain  without sciatica:   Metastatic colon cancer in female:   Non-intractable vomiting with nausea, unspecified vomiting type:   Diagnosis management comments: Patient had an MRI done approximately one week ago that showed likely metastatic disease to L2 and L3.  There was also likely inflammation of the left S1 nerve root.  Patient presented to the ER complaining of increased back pain.  She had a normal neuro exam.  She also reported nausea, vomiting, and abdominal pain.  CT the abdomen and pelvis did not show any acute abnormalities.  Patient's white blood cell count was elevated but she is currently taking steroids.  She was mildly dehydrated.  She was given IV fluids, IV Zofran, and IV Dilaudid.  Her pain improved.  Patient has a history of metastatic colon cancer and has taken hydrocodone in the past without relief.  Given her history of metastatic cancer and severe back pain, I will prescribe her a 3 day supply of Dilaudid 1 mg every 6 hours as needed for pain.  I discussed with the patient and her family my recommendation that she talk to her primary care doctor and/or oncologist about options for long-term pain management.  Patient will also be given a prescription for Zofran.       Amount and/or Complexity of Data Reviewed  Clinical lab tests: reviewed (WBC is 17.21. Bun/Creatinine 41.0. )  Tests in the radiology section of CPT®: reviewed and ordered (CT shows progression of pulmonary metastatic disease. Stable small liver lesions, likely metastatic as well. Stable postsurgical changes near the distal rectum with small  enhancing fluid collection centered to the right of midline.)  Decide to obtain previous medical records or to obtain history from someone other than the patient: yes  Review and summarize past medical records: yes (Pt was last seen in ED 12/2017 for lower abdominal pain.  CT of abdomen pelvis showed numerous nodules in both lungs. 4/26/18 Pt had XR of L-spine that were normal. MRI of L spine  done on 4/27/18 signal abnormality of left side of L2 and L3 most consistent with mets. Some enhancement of left S1 nerve and some degenerative changes of the spine.  L4-5 posterior disk bulge. )    Patient Progress  Patient progress: stable         DIAGNOSIS  Final diagnoses:   Acute midline low back pain without sciatica   Metastatic colon cancer in female   Non-intractable vomiting with nausea, unspecified vomiting type       DISPOSITION  DISCHARGE    Patient discharged in stable condition.    Reviewed implications of results, diagnosis, meds, responsibility to follow up, warning signs and symptoms of possible worsening, potential complications and reasons to return to ER.    Patient/Family voiced understanding of above instructions.    Discussed plan for discharge, as there is no emergent indication for admission. Patient referred to primary care provider for BP management due to today's BP. Pt/family is agreeable and understands need for follow up and repeat testing.  Pt is aware that discharge does not mean that nothing is wrong but it indicates no emergency is present that requires admission and they must continue care with follow-up as given below or physician of their choice.     FOLLOW-UP  Jase Warner  [address] [id number]  [phone]    Call in 1 day           Medication List      New Prescriptions    HYDROmorphone 2 MG tablet  Commonly known as:  DILAUDID  Take 0.5 tablets by mouth Every 6 (Six) Hours As Needed for Severe Pain    for up to 12 doses.     ondansetron 4 MG tablet  Commonly known as:  ZOFRAN  Take 1 tablet by mouth Every 6 (Six) Hours As Needed for Nausea or   Vomiting.              Latest Documented Vital Signs:  As of 2:45 AM  BP- 110/83 HR- 83 Temp- 97.8 °F (36.6 °C) (Tympanic) O2 sat- 97%    --  Documentation assistance provided by luis e Gillette for Dr. Harkins.  Information recorded by the luis e was done at my direction and has been verified and validated by  me.        Lesia Gillette  05/04/18 0231       Sandro Harkins MD  05/04/18 0245

## 2018-05-04 NOTE — ED NOTES
Pt here for c/o back pain and nausea - pt recently seen at [facility], had mri done - pt has metastatic cancer, probably involving lumbar spine.  Pt appears in moderate/severe distress from pain.  Pt has mediport which has been accessed and blood sent for analysis. Bed in low position, call light within reach, side rails up X2. Pt is alert and oriented X4, PERRLA, respirations are even and unlabored, chest rise and fall is equal in expansion.  Pt does not appear to be in distress at this time     Mable Eaton RN  05/04/18 0042

## 2018-05-04 NOTE — ED NOTES
Patient having severe lower back pain, patient has stage 4 metastatic colorectal cancer. Also been diagnosed with bulging discs. Pt also having nausea and vomiting 4x today been going on for a week.     Yeimy Richard RN  05/03/18 1625       Yeimy Richard RN  05/03/18 3960

## 2018-05-15 ENCOUNTER — HOSPITAL ENCOUNTER (EMERGENCY)
Facility: HOSPITAL | Age: 43
Discharge: HOME | End: 2018-05-15
Attending: EMERGENCY MEDICINE | Admitting: EMERGENCY MEDICINE

## 2018-05-15 VITALS
RESPIRATION RATE: 16 BRPM | HEIGHT: 66 IN | OXYGEN SATURATION: 97 % | HEART RATE: 76 BPM | DIASTOLIC BLOOD PRESSURE: 66 MMHG | TEMPERATURE: 97.5 F | WEIGHT: 106 LBS | SYSTOLIC BLOOD PRESSURE: 105 MMHG | BODY MASS INDEX: 17.04 KG/M2

## 2018-05-15 DIAGNOSIS — I95.9 TRANSIENT HYPOTENSION: ICD-10-CM

## 2018-05-15 DIAGNOSIS — E86.0 DEHYDRATION: Primary | ICD-10-CM

## 2018-05-15 LAB
ALBUMIN SERPL-MCNC: 4 G/DL (ref 3.5–5.2)
ALBUMIN/GLOB SERPL: 1.2 G/DL
ALP SERPL-CCNC: 81 U/L (ref 39–117)
ALT SERPL W P-5'-P-CCNC: 7 U/L (ref 1–33)
ANION GAP SERPL CALCULATED.3IONS-SCNC: 10.8 MMOL/L
AST SERPL-CCNC: 17 U/L (ref 1–32)
BASOPHILS # BLD AUTO: 0 10*3/MM3 (ref 0–0.2)
BASOPHILS NFR BLD AUTO: 0 % (ref 0–1.5)
BILIRUB SERPL-MCNC: 0.3 MG/DL (ref 0.1–1.2)
BUN BLD-MCNC: 9 MG/DL (ref 6–20)
BUN/CREAT SERPL: 16.4 (ref 7–25)
CALCIUM SPEC-SCNC: 9.3 MG/DL (ref 8.6–10.5)
CHLORIDE SERPL-SCNC: 104 MMOL/L (ref 98–107)
CO2 SERPL-SCNC: 24.2 MMOL/L (ref 22–29)
CREAT BLD-MCNC: 0.55 MG/DL (ref 0.57–1)
DEPRECATED RDW RBC AUTO: 45.3 FL (ref 37–54)
EOSINOPHIL # BLD AUTO: 0 10*3/MM3 (ref 0–0.7)
EOSINOPHIL NFR BLD AUTO: 0 % (ref 0.3–6.2)
ERYTHROCYTE [DISTWIDTH] IN BLOOD BY AUTOMATED COUNT: 12.4 % (ref 11.7–13)
GFR SERPL CREATININE-BSD FRML MDRD: 122 ML/MIN/1.73
GLOBULIN UR ELPH-MCNC: 3.3 GM/DL
GLUCOSE BLD-MCNC: 145 MG/DL (ref 65–99)
HCT VFR BLD AUTO: 40.1 % (ref 35.6–45.5)
HGB BLD-MCNC: 13.5 G/DL (ref 11.9–15.5)
HOLD SPECIMEN: NORMAL
HOLD SPECIMEN: NORMAL
IMM GRANULOCYTES # BLD: 0.03 10*3/MM3 (ref 0–0.03)
IMM GRANULOCYTES NFR BLD: 0.3 % (ref 0–0.5)
LYMPHOCYTES # BLD AUTO: 0.5 10*3/MM3 (ref 0.9–4.8)
LYMPHOCYTES NFR BLD AUTO: 4.6 % (ref 19.6–45.3)
MCH RBC QN AUTO: 33.8 PG (ref 26.9–32)
MCHC RBC AUTO-ENTMCNC: 33.7 G/DL (ref 32.4–36.3)
MCV RBC AUTO: 100.5 FL (ref 80.5–98.2)
MONOCYTES # BLD AUTO: 0.02 10*3/MM3 (ref 0.2–1.2)
MONOCYTES NFR BLD AUTO: 0.2 % (ref 5–12)
NEUTROPHILS # BLD AUTO: 10.4 10*3/MM3 (ref 1.9–8.1)
NEUTROPHILS NFR BLD AUTO: 95.2 % (ref 42.7–76)
PLATELET # BLD AUTO: 252 10*3/MM3 (ref 140–500)
PMV BLD AUTO: 9.5 FL (ref 6–12)
POTASSIUM BLD-SCNC: 4.4 MMOL/L (ref 3.5–5.2)
PROT SERPL-MCNC: 7.3 G/DL (ref 6–8.5)
RBC # BLD AUTO: 3.99 10*6/MM3 (ref 3.9–5.2)
SODIUM BLD-SCNC: 139 MMOL/L (ref 136–145)
WBC NRBC COR # BLD: 10.92 10*3/MM3 (ref 4.5–10.7)
WHOLE BLOOD HOLD SPECIMEN: NORMAL
WHOLE BLOOD HOLD SPECIMEN: NORMAL

## 2018-05-15 PROCEDURE — 80053 COMPREHEN METABOLIC PANEL: CPT | Performed by: NURSE PRACTITIONER

## 2018-05-15 PROCEDURE — 25010000002 HEPARIN FLUSH (PORCINE) 100 UNIT/ML SOLUTION: Performed by: EMERGENCY MEDICINE

## 2018-05-15 PROCEDURE — 99284 EMERGENCY DEPT VISIT MOD MDM: CPT

## 2018-05-15 PROCEDURE — 96360 HYDRATION IV INFUSION INIT: CPT

## 2018-05-15 PROCEDURE — 85025 COMPLETE CBC W/AUTO DIFF WBC: CPT | Performed by: NURSE PRACTITIONER

## 2018-05-15 RX ORDER — LIDOCAINE 50 MG/G
1 PATCH TOPICAL EVERY 24 HOURS
COMMUNITY
End: 2019-08-28 | Stop reason: HOSPADM

## 2018-05-15 RX ORDER — DEXAMETHASONE 2 MG/1
2 TABLET ORAL 2 TIMES DAILY WITH MEALS
Status: ON HOLD | COMMUNITY
End: 2019-06-13

## 2018-05-15 RX ADMIN — SODIUM CHLORIDE 1000 ML: 9 INJECTION, SOLUTION INTRAVENOUS at 18:39

## 2018-05-15 RX ADMIN — Medication 500 UNITS: at 20:51

## 2018-05-15 NOTE — ED PROVIDER NOTES
EMERGENCY DEPARTMENT ENCOUNTER    CHIEF COMPLAINT  Chief Complaint: Hypotension  History given by: Pt  History limited by: nothing  Room Number: 13/13  Oncologist: Jase Warner MD      HPI:  Pt is a 43 y.o. female who presents with stage IV colorectal cancer complaining of being hypotensive after a visit today with heroncologist. Pt states that her BP was 86/60 at her visit. Pt also complains of fatigue, SOA, cough, constipation but denies diarrhea, vomiting, HA, dizziness, and CP. Pt states that she has been on chemo and steroids for two years. Pt last had chemo and radiation three months ago. Pt takes Dilaudid, Zofran, put on decadrone four days ago, lidocaine patch for inflammation, and given shot of steroids by oncologist today PTA. Pt was instructed to come to ED by her oncologist.     Duration:  PTA  Quality: BP 85/60  Intensity/Severity: moderate  Associated Symptoms: SOA, fatigue, cough, constipation  Aggravating Factors: none  Alleviating Factors: none  Previous Episodes: none  Treatment before arrival: Pt was instructed to come to ED by her oncologist.     PAST MEDICAL HISTORY  Active Ambulatory Problems     Diagnosis Date Noted   • No Active Ambulatory Problems     Resolved Ambulatory Problems     Diagnosis Date Noted   • No Resolved Ambulatory Problems     Past Medical History:   Diagnosis Date   • Cancer        PAST SURGICAL HISTORY  Past Surgical History:   Procedure Laterality Date   • COLON RESECTION      COLORECTAL RESECTION       FAMILY HISTORY  History reviewed. No pertinent family history.    SOCIAL HISTORY  Social History     Social History   • Marital status:      Spouse name: N/A   • Number of children: N/A   • Years of education: N/A     Occupational History   • Not on file.     Social History Main Topics   • Smoking status: Never Smoker   • Smokeless tobacco: Never Used   • Alcohol use No   • Drug use: No   • Sexual activity: Not on file     Other Topics Concern   • Not on file      Social History Narrative   • No narrative on file       ALLERGIES  Review of patient's allergies indicates no known allergies.    REVIEW OF SYSTEMS  Review of Systems   Constitutional: Positive for fatigue. Negative for fever.   HENT: Negative for sore throat.    Eyes: Negative.    Respiratory: Positive for cough and shortness of breath.    Cardiovascular: Negative for chest pain.   Gastrointestinal: Positive for constipation. Negative for abdominal pain, diarrhea and vomiting.   Genitourinary: Negative for dysuria.   Musculoskeletal: Negative for neck pain.   Skin: Negative for rash.   Allergic/Immunologic: Negative.    Neurological: Negative for weakness, numbness and headaches.   Hematological: Negative.    Psychiatric/Behavioral: Negative.    All other systems reviewed and are negative.      PHYSICAL EXAM  ED Triage Vitals   Temp Heart Rate Resp BP SpO2   05/15/18 1600 05/15/18 1559 05/15/18 1559 05/15/18 1603 05/15/18 1559   98.2 °F (36.8 °C) 71 16 (!) 94/35 97 %      Temp src Heart Rate Source Patient Position BP Location FiO2 (%)   05/15/18 1600 05/15/18 1938 -- -- --   Tympanic Monitor            Physical Exam   Constitutional: She is oriented to person, place, and time and well-developed, well-nourished, and in no distress. She appears malnourished (Pt is thin. ).   HENT:   Oropharynx is mildly dry.    Eyes: EOM are normal.   Neck: Normal range of motion. Neck supple.   Cardiovascular: Normal rate, regular rhythm, normal heart sounds and intact distal pulses.    Pulses:       Dorsalis pedis pulses are 2+ on the right side, and 2+ on the left side.   Pulmonary/Chest: Effort normal and breath sounds normal. No respiratory distress.   Abdominal: There is no tenderness. There is no rebound and no guarding.   Musculoskeletal: Normal range of motion. She exhibits no edema.   Neurological: She is alert and oriented to person, place, and time. She has normal sensation and normal strength.   Skin: Skin is warm  and dry. No pallor.   Psychiatric: Mood and affect normal.   Nursing note and vitals reviewed.      LAB RESULTS  Lab Results (last 24 hours)     Procedure Component Value Units Date/Time    CBC & Differential [[id number]] Collected:  05/15/18 1839    Specimen:  Blood Updated:  05/15/18 1857    Narrative:       The following orders were created for panel order CBC & Differential.  Procedure                               Abnormality         Status                     ---------                               -----------         ------                     CBC Auto Differential[[id number]]        Abnormal            Final result                 Please view results for these tests on the individual orders.    Comprehensive Metabolic Panel [[id number]]  (Abnormal) Collected:  05/15/18 1839    Specimen:  Blood Updated:  05/15/18 1916     Glucose 145 (H) mg/dL      BUN 9 mg/dL      Creatinine 0.55 (L) mg/dL      Sodium 139 mmol/L      Potassium 4.4 mmol/L      Chloride 104 mmol/L      CO2 24.2 mmol/L      Calcium 9.3 mg/dL      Total Protein 7.3 g/dL      Albumin 4.00 g/dL      ALT (SGPT) 7 U/L      AST (SGOT) 17 U/L      Alkaline Phosphatase 81 U/L      Total Bilirubin 0.3 mg/dL      eGFR Non African Amer 122 mL/min/1.73      Globulin 3.3 gm/dL      A/G Ratio 1.2 g/dL      BUN/Creatinine Ratio 16.4     Anion Gap 10.8 mmol/L     CBC Auto Differential [[id number]]  (Abnormal) Collected:  05/15/18 1839    Specimen:  Blood Updated:  05/15/18 1857     WBC 10.92 (H) 10*3/mm3      RBC 3.99 10*6/mm3      Hemoglobin 13.5 g/dL      Hematocrit 40.1 %      .5 (H) fL      MCH 33.8 (H) pg      MCHC 33.7 g/dL      RDW 12.4 %      RDW-SD 45.3 fl      MPV 9.5 fL      Platelets 252 10*3/mm3      Neutrophil % 95.2 (H) %      Lymphocyte % 4.6 (L) %      Monocyte % 0.2 (L) %      Eosinophil % 0.0 (L) %      Basophil % 0.0 %      Immature Grans % 0.3 %      Neutrophils, Absolute 10.40 (H) 10*3/mm3      Lymphocytes, Absolute 0.50 (L) 10*3/mm3       Monocytes, Absolute 0.02 (L) 10*3/mm3      Eosinophils, Absolute 0.00 10*3/mm3      Basophils, Absolute 0.00 10*3/mm3      Immature Grans, Absolute 0.03 10*3/mm3           I ordered the above labs and reviewed the results    PROCEDURES  Procedures      PROGRESS AND CONSULTS  ED Course     1634: Ordered blood work for further evaluation.     1740: Notified pt of plan to evaluate after labs and IVF. Pt understands and agrees with the plan, all questions answered.    1743: Ordered IVF bolus 1000 mL.     1939: Pt rechecked. Pt states that she was able to tolerate PO intake. Notified pt of lab results and plan to evaluate disposition after finished IVF. Pt understands and agrees with the plan, all questions answered.    2036: Pt rechecked. Pt resting comfortably and in no acute distress. Pt states that her symptoms have resolved since administration of IVF. Pt's BP is 91/56. Notified pt of plan to recheck BP in a couple of minutes prior to disposition. Pt understands and agrees with the plan, all questions answered.    2041: Pt's BP rechecked by RN, BP- 105/66.    MEDICAL DECISION MAKING  Results were reviewed/discussed with the patient and they were also made aware of online access. Pt also made aware that some labs, such as cultures, will not be resulted during ER visit and follow up with PMD is necessary.     MDM  Number of Diagnoses or Management Options  Dehydration:   Transient hypotension:      Amount and/or Complexity of Data Reviewed  Clinical lab tests: ordered and reviewed  Decide to obtain previous medical records or to obtain history from someone other than the patient: yes  Independent visualization of images, tracings, or specimens: yes    Patient Progress  Patient progress: stable         DIAGNOSIS  Final diagnoses:   Dehydration   Transient hypotension       DISPOSITION  DISCHARGE    Patient discharged in stable condition.    Reviewed implications of results, diagnosis, meds, responsibility to follow  up, warning signs and symptoms of possible worsening, potential complications and reasons to return to ER.    Patient/Family voiced understanding of above instructions.    Discussed plan for discharge, as there is no emergent indication for admission. Patient referred to primary care provider for BP management due to today's BP. Pt/family is agreeable and understands need for follow up and repeat testing.  Pt is aware that discharge does not mean that nothing is wrong but it indicates no emergency is present that requires admission and they must continue care with follow-up as given below or physician of their choice.     FOLLOW-UP  Jase Warner  [address] [facility] [address]  [phone]    Schedule an appointment as soon as possible for a visit   If symptoms worsen         Medication List      No changes were made to your prescriptions during this visit.     Latest Documented Vital Signs:  As of 8:37 PM  BP- 102/69 HR- 75 Temp- 98.2 °F (36.8 °C) (Tympanic) O2 sat- 96%  --    Documentation assistance provided by luis e Santamaria for Dr. Ellis.  Information recorded by the luis e was done at my direction and has been verified and validated by me.         Brenda Santamaria  05/15/18 2041       Jason Ellis MD  05/16/18 0011

## 2018-05-15 NOTE — ED NOTES
Stage IV colorectal cancer, mets to lungs, liver, spine. Pt last chemo Feb, no radiation. Has had colorectal resection, no colostomy.     Marycruz Morgan RN  05/15/18 7578

## 2018-05-15 NOTE — ED NOTES
"Pt reports hypotension and fatigue. Pt states \"I was at my PCP office today and my blood pressure was 80/60.\" Denies chest pain, SOA, h/a, dizziness. C/o fatigue x6 months. Hx colon cancer.     Leticia Elizabeth RN  05/15/18 2628    "

## 2018-08-09 ENCOUNTER — APPOINTMENT (OUTPATIENT)
Dept: GENERAL RADIOLOGY | Facility: HOSPITAL | Age: 43
End: 2018-08-09

## 2018-08-09 ENCOUNTER — HOSPITAL ENCOUNTER (EMERGENCY)
Facility: HOSPITAL | Age: 43
Discharge: ANOTHER HOSPITAL | End: 2018-08-09
Attending: EMERGENCY MEDICINE | Admitting: EMERGENCY MEDICINE

## 2018-08-09 VITALS
RESPIRATION RATE: 18 BRPM | WEIGHT: 106.7 LBS | BODY MASS INDEX: 17.15 KG/M2 | OXYGEN SATURATION: 100 % | TEMPERATURE: 99 F | HEIGHT: 66 IN | DIASTOLIC BLOOD PRESSURE: 61 MMHG | SYSTOLIC BLOOD PRESSURE: 92 MMHG | HEART RATE: 70 BPM

## 2018-08-09 DIAGNOSIS — J93.9 PNEUMOTHORAX, UNSPECIFIED TYPE: Primary | ICD-10-CM

## 2018-08-09 LAB
ALBUMIN SERPL-MCNC: 3.6 G/DL (ref 3.5–5.2)
ALBUMIN/GLOB SERPL: 1.1 G/DL
ALP SERPL-CCNC: 72 U/L (ref 39–117)
ALT SERPL W P-5'-P-CCNC: 9 U/L (ref 1–33)
ANION GAP SERPL CALCULATED.3IONS-SCNC: 10.1 MMOL/L
APTT PPP: 29.7 SECONDS (ref 22.7–35.4)
AST SERPL-CCNC: 8 U/L (ref 1–32)
BASOPHILS # BLD AUTO: 0 10*3/MM3 (ref 0–0.2)
BASOPHILS NFR BLD AUTO: 0 % (ref 0–1.5)
BILIRUB SERPL-MCNC: 0.7 MG/DL (ref 0.1–1.2)
BUN BLD-MCNC: 13 MG/DL (ref 6–20)
BUN/CREAT SERPL: 24.5 (ref 7–25)
CALCIUM SPEC-SCNC: 9.6 MG/DL (ref 8.6–10.5)
CHLORIDE SERPL-SCNC: 103 MMOL/L (ref 98–107)
CO2 SERPL-SCNC: 26.9 MMOL/L (ref 22–29)
CREAT BLD-MCNC: 0.53 MG/DL (ref 0.57–1)
DEPRECATED RDW RBC AUTO: 51.4 FL (ref 37–54)
EOSINOPHIL # BLD AUTO: 0.01 10*3/MM3 (ref 0–0.7)
EOSINOPHIL NFR BLD AUTO: 0.1 % (ref 0.3–6.2)
ERYTHROCYTE [DISTWIDTH] IN BLOOD BY AUTOMATED COUNT: 14.7 % (ref 11.7–13)
GFR SERPL CREATININE-BSD FRML MDRD: 127 ML/MIN/1.73
GLOBULIN UR ELPH-MCNC: 3.3 GM/DL
GLUCOSE BLD-MCNC: 85 MG/DL (ref 65–99)
HCT VFR BLD AUTO: 35.7 % (ref 35.6–45.5)
HGB BLD-MCNC: 11.4 G/DL (ref 11.9–15.5)
IMM GRANULOCYTES # BLD: 0.02 10*3/MM3 (ref 0–0.03)
IMM GRANULOCYTES NFR BLD: 0.2 % (ref 0–0.5)
INR PPP: 1.17 (ref 0.9–1.1)
LYMPHOCYTES # BLD AUTO: 2.06 10*3/MM3 (ref 0.9–4.8)
LYMPHOCYTES NFR BLD AUTO: 17.9 % (ref 19.6–45.3)
MCH RBC QN AUTO: 30.7 PG (ref 26.9–32)
MCHC RBC AUTO-ENTMCNC: 31.9 G/DL (ref 32.4–36.3)
MCV RBC AUTO: 96.2 FL (ref 80.5–98.2)
MONOCYTES # BLD AUTO: 0.18 10*3/MM3 (ref 0.2–1.2)
MONOCYTES NFR BLD AUTO: 1.6 % (ref 5–12)
NEUTROPHILS # BLD AUTO: 9.27 10*3/MM3 (ref 1.9–8.1)
NEUTROPHILS NFR BLD AUTO: 80.4 % (ref 42.7–76)
PLATELET # BLD AUTO: 559 10*3/MM3 (ref 140–500)
PMV BLD AUTO: 8.7 FL (ref 6–12)
POTASSIUM BLD-SCNC: 3.8 MMOL/L (ref 3.5–5.2)
PROT SERPL-MCNC: 6.9 G/DL (ref 6–8.5)
PROTHROMBIN TIME: 14.7 SECONDS (ref 11.7–14.2)
RBC # BLD AUTO: 3.71 10*6/MM3 (ref 3.9–5.2)
SODIUM BLD-SCNC: 140 MMOL/L (ref 136–145)
TROPONIN T SERPL-MCNC: <0.01 NG/ML (ref 0–0.03)
WBC NRBC COR # BLD: 11.52 10*3/MM3 (ref 4.5–10.7)

## 2018-08-09 PROCEDURE — 93010 ELECTROCARDIOGRAM REPORT: CPT | Performed by: INTERNAL MEDICINE

## 2018-08-09 PROCEDURE — 80053 COMPREHEN METABOLIC PANEL: CPT | Performed by: PHYSICIAN ASSISTANT

## 2018-08-09 PROCEDURE — 99284 EMERGENCY DEPT VISIT MOD MDM: CPT

## 2018-08-09 PROCEDURE — 85610 PROTHROMBIN TIME: CPT | Performed by: EMERGENCY MEDICINE

## 2018-08-09 PROCEDURE — 96361 HYDRATE IV INFUSION ADD-ON: CPT

## 2018-08-09 PROCEDURE — 85730 THROMBOPLASTIN TIME PARTIAL: CPT | Performed by: EMERGENCY MEDICINE

## 2018-08-09 PROCEDURE — 93005 ELECTROCARDIOGRAM TRACING: CPT | Performed by: PHYSICIAN ASSISTANT

## 2018-08-09 PROCEDURE — 96360 HYDRATION IV INFUSION INIT: CPT

## 2018-08-09 PROCEDURE — 84484 ASSAY OF TROPONIN QUANT: CPT | Performed by: PHYSICIAN ASSISTANT

## 2018-08-09 PROCEDURE — 85025 COMPLETE CBC W/AUTO DIFF WBC: CPT | Performed by: PHYSICIAN ASSISTANT

## 2018-08-09 PROCEDURE — 71045 X-RAY EXAM CHEST 1 VIEW: CPT

## 2018-08-09 RX ORDER — SODIUM CHLORIDE 9 MG/ML
20 INJECTION, SOLUTION INTRAVENOUS CONTINUOUS
Status: DISCONTINUED | OUTPATIENT
Start: 2018-08-09 | End: 2018-08-09 | Stop reason: HOSPADM

## 2018-08-09 RX ORDER — SODIUM CHLORIDE 0.9 % (FLUSH) 0.9 %
10 SYRINGE (ML) INJECTION AS NEEDED
Status: DISCONTINUED | OUTPATIENT
Start: 2018-08-09 | End: 2018-08-09 | Stop reason: HOSPADM

## 2018-08-09 RX ORDER — HYDROCODONE BITARTRATE AND ACETAMINOPHEN 5; 325 MG/1; MG/1
1 TABLET ORAL EVERY 6 HOURS PRN
Status: DISCONTINUED | OUTPATIENT
Start: 2018-08-09 | End: 2018-08-09 | Stop reason: HOSPADM

## 2018-08-09 RX ADMIN — HYDROCODONE BITARTRATE AND ACETAMINOPHEN 1 TABLET: 5; 325 TABLET ORAL at 15:13

## 2018-08-09 RX ADMIN — SODIUM CHLORIDE 20 ML/HR: 9 INJECTION, SOLUTION INTRAVENOUS at 15:17

## 2018-08-09 NOTE — ED PROVIDER NOTES
Pt presents to the ED c/o L sided CP which began last night.  Pt describes the pain as 'sharp' which is worsened with deep breathing.  Pt reports hx of R sided pneumothorax with her chest tube removed 2 weeks ago by Dr. Mg (pulmonologist).  Pt reports she had an CXR on 8/6/18 which did not show pneumothorax at that time.  Pt has hx of colon cancer with diffuse met to liver and lung.    On exam, patient is chronically ill appearing but is in no distress, specifically no respiratory distress.  pt is A+Ox3 and is stable. Pt's O2 sat is 100% on room air and lungs are in no respiratory distress.  Patient does have some mild decreased breath sounds on the left compared to the right.  Pt's neck has no distended veins.  Pt has port to R chest which is clean, dry, and intact wit no discharge or signs of infection.  Patient's abdomen is soft and nontender.  To her right lower chest wall she has an incision that is healing with no signs of acute infection.    EKG           EKG time: 1147  Rhythm/Rate: SR,70  P waves and CA:normal  QRS, axis: narrow complex   ST and T waves: normal QT  No acute process    Interpreted Contemporaneously by me, independently viewed.    No prior for comparison.    Pt stable. I spoke with Dr. Pearson, radiology, CXR shows 10-15% pneumothorax on L side and diffuse metastatic disease in lung. Platelets: 559.    1210  Discussed with pt risk of deterioration upon transfer to [facility].  Pt states she understands the risks and would prefer to be transferred to [facility].  Awaitin conversations with [facility] chest surgeon and Dr. Mg. Pt understands and agrees with the plan, all questions answered.    1216  Spoke with Jase Warner who is medical oncologist who knows the pt well, who is going to make arrangement to transfer to [facility] and get in touch with Dr. Mg.  Plan for admission to [facility] under Dr. Warner with consult by Dr. Mg. Pt will be transferred under ACLS.  Pt understands and agrees with the  plan, all questions answered.    1239  BP- 113/84 HR- 80 Temp- 99 °F (37.2 °C) (Oral) O2 sat- 98%  Spoke with Dr. Mg and updated him on pt's case, CXR results, discussion with Dr. Warner, and plan for transfer.  Dr. Mg agrees with the plan.  He states the pt sustained a greater one on the right for a long period of time so it is reasonable to wait for transfer.   Pt will be transferred.  Updated pt on plan and rechecked pt, who is still stable. Pt understands and agrees with the plan, all questions answered.    1417  Reassessed pt who is resting comfortably and remains stable.  Pt requests to take her ativan.     MD ATTESTATION NOTE    The YOSVANY and I have discussed this patient's history, physical exam, and treatment plan.  I have reviewed the documentation and personally had a face to face interaction with the patient. I affirm the documentation and agree with the treatment and plan.  The attached note describes my personal findings.    Documentation assistance provided by luis e Currie for Dr. Gunderson. Information recorded by the scrdue was done at my direction and has been verified and validated by me.       Henny Currie  08/09/18 5742       Bam Gunderson MD  08/09/18 6809  5:05 PM  I just reevaluated the patient just as [facility] ambulance was arriving to take her to [facility].  Patient's status is unchanged she is in no distress and looks well and is smiling.  I talked with EMS as well.  The patient is stable for transport.  Very very unlikely there will be any worsening of her status in route.     Bam Gunderson MD  08/09/18 2053

## 2018-08-09 NOTE — ED TRIAGE NOTES
Patient presents to ER via private vehicle from home.  Patient reports abrupt onset of dyspnea that started yesterday but became worse throughout the night.  Patient see [facility] for metastatic colon cancer.  Patient had pneumothorax last  Month  And had chest tube.

## 2018-08-09 NOTE — PROGRESS NOTES
Spoke suzi MAN to arrange transfer, w/ ACLS crew, to [facility]. ETA is 3pm at the latest. Carmela RANKIN and YRN MALDONADO updated

## 2018-08-09 NOTE — PROGRESS NOTES
Sacha archer/ Jen, [facility], [facility]-patient will be going to Room 632 on 6S, and report can be called to [phone].

## 2018-08-09 NOTE — ED PROVIDER NOTES
"EMERGENCY DEPARTMENT ENCOUNTER    Room Number:  23/23  Date seen:  8/9/2018  Time seen: 10:44 AM  PCP: Jase Warner  Historian: Patient, Family      HPI:  Chief complaint: Chest Pain  Context: Vita Ren is a 43 y.o. female with stage IV colon cancer with metastases to the lungs, liver, and T-Spine for which pt is followed at the [facility] and is currently undergoing chemotherapy (pt last underwent chemotherapy earlier this week). Pt reports that about 3 weeks ago, pt was admitted at the [facility] secondary to a right-sided PTX for which pt had a chest tube inserted and underwent a pleurodesis. Pt presents to the ED c/o intermittent episodes of left-sided chest pain radiating to the upper back onset earlier today. Pt reports that her chest pain worsens with inspiration. Pt has taken Ibuprofen and Dilaudid with moderate sx relief. Pt has also had dyspnea, but denies cough, congestion, documented fever, palpitations, abdominal pain, and N/V/D. There are no other complaints at this time.     Onset Quality: Gradual in onset  Location: Left side of the chest  Radiation: Upper back  Duration: Onset earlier today  Timing: Intermittent  Character: \"aching\"  Aggravating Factors: Inspiration  Alleviating Factors: Ibuprofen, Dilaudid  Severity: Moderate        MEDICAL RECORD REVIEW    Pt was last seen in the ER on 05/15/18 secondary to dehydration.          ALLERGIES  Patient has no known allergies.    PAST MEDICAL HISTORY  Active Ambulatory Problems     Diagnosis Date Noted   • No Active Ambulatory Problems     Resolved Ambulatory Problems     Diagnosis Date Noted   • No Resolved Ambulatory Problems     Past Medical History:   Diagnosis Date   • Cancer (CMS/HCC)    • Pneumothorax        PAST SURGICAL HISTORY  Past Surgical History:   Procedure Laterality Date   • CHEST TUBE INSERTION     • COLON RESECTION      COLORECTAL RESECTION       FAMILY HISTORY  History reviewed. No pertinent family " history.    SOCIAL HISTORY  Social History     Social History   • Marital status:      Spouse name: N/A   • Number of children: N/A   • Years of education: N/A     Occupational History   • Not on file.     Social History Main Topics   • Smoking status: Never Smoker   • Smokeless tobacco: Never Used   • Alcohol use No   • Drug use: No   • Sexual activity: Not on file     Other Topics Concern   • Not on file     Social History Narrative   • No narrative on file           REVIEW OF SYSTEMS  Review of Systems   Constitutional: Negative for chills and fever (pt denies documented fever).   HENT: Negative for congestion, rhinorrhea and sore throat.    Eyes: Negative for pain.   Respiratory: Positive for shortness of breath. Negative for cough.    Cardiovascular: Positive for chest pain (radiating to the upper back). Negative for palpitations and leg swelling.   Gastrointestinal: Negative for abdominal pain, diarrhea, nausea and vomiting.   Genitourinary: Negative for difficulty urinating, dysuria, flank pain and frequency.   Musculoskeletal: Positive for back pain (associated with chest pain). Negative for myalgias, neck pain and neck stiffness.   Skin: Negative for rash.   Neurological: Negative for dizziness, speech difficulty, weakness, light-headedness, numbness and headaches.   Psychiatric/Behavioral: Negative.    All other systems reviewed and are negative.          PHYSICAL EXAM  ED Triage Vitals   Temp Heart Rate Resp BP SpO2   08/09/18 1038 08/09/18 1030 08/09/18 1030 08/09/18 1038 08/09/18 1030   99 °F (37.2 °C) 111 20 123/72 97 %      Temp src Heart Rate Source Patient Position BP Location FiO2 (%)   08/09/18 1030 08/09/18 1030 08/09/18 1038 08/09/18 1038 --   Tympanic Monitor Sitting Left arm      Physical Exam   Constitutional: She is oriented to person, place, and time. She appears cachectic. No distress.   Pt is chronically ill-appearing.    HENT:   Head: Normocephalic.   Mouth/Throat: Mucous  membranes are normal.   Eyes: Pupils are equal, round, and reactive to light. EOM are normal.   Neck: Normal range of motion. Neck supple.   Cardiovascular: Normal rate, regular rhythm and normal heart sounds.    Pulmonary/Chest: Effort normal. No respiratory distress. She has decreased breath sounds (bilaterally). She has no wheezes. She has no rhonchi. She has no rales.   There is a healed chest tube incision on the right chest wall - there is no erythema present.    Abdominal: Soft. There is no tenderness. There is no rebound and no guarding.   Musculoskeletal: Normal range of motion.   Neurological: She is alert and oriented to person, place, and time. She has normal sensation.   Skin: Skin is warm and dry.   Psychiatric: Mood and affect normal.   Nursing note and vitals reviewed.          LAB RESULTS  Recent Results (from the past 24 hour(s))   Comprehensive Metabolic Panel    Collection Time: 08/09/18 11:14 AM   Result Value Ref Range    Glucose 85 65 - 99 mg/dL    BUN 13 6 - 20 mg/dL    Creatinine 0.53 (L) 0.57 - 1.00 mg/dL    Sodium 140 136 - 145 mmol/L    Potassium 3.8 3.5 - 5.2 mmol/L    Chloride 103 98 - 107 mmol/L    CO2 26.9 22.0 - 29.0 mmol/L    Calcium 9.6 8.6 - 10.5 mg/dL    Total Protein 6.9 6.0 - 8.5 g/dL    Albumin 3.60 3.50 - 5.20 g/dL    ALT (SGPT) 9 1 - 33 U/L    AST (SGOT) 8 1 - 32 U/L    Alkaline Phosphatase 72 39 - 117 U/L    Total Bilirubin 0.7 0.1 - 1.2 mg/dL    eGFR Non African Amer 127 >60 mL/min/1.73    Globulin 3.3 gm/dL    A/G Ratio 1.1 g/dL    BUN/Creatinine Ratio 24.5 7.0 - 25.0    Anion Gap 10.1 mmol/L   Troponin    Collection Time: 08/09/18 11:14 AM   Result Value Ref Range    Troponin T <0.010 0.000 - 0.030 ng/mL   CBC Auto Differential    Collection Time: 08/09/18 11:14 AM   Result Value Ref Range    WBC 11.52 (H) 4.50 - 10.70 10*3/mm3    RBC 3.71 (L) 3.90 - 5.20 10*6/mm3    Hemoglobin 11.4 (L) 11.9 - 15.5 g/dL    Hematocrit 35.7 35.6 - 45.5 %    MCV 96.2 80.5 - 98.2 fL    MCH  30.7 26.9 - 32.0 pg    MCHC 31.9 (L) 32.4 - 36.3 g/dL    RDW 14.7 (H) 11.7 - 13.0 %    RDW-SD 51.4 37.0 - 54.0 fl    MPV 8.7 6.0 - 12.0 fL    Platelets 559 (H) 140 - 500 10*3/mm3    Neutrophil % 80.4 (H) 42.7 - 76.0 %    Lymphocyte % 17.9 (L) 19.6 - 45.3 %    Monocyte % 1.6 (L) 5.0 - 12.0 %    Eosinophil % 0.1 (L) 0.3 - 6.2 %    Basophil % 0.0 0.0 - 1.5 %    Immature Grans % 0.2 0.0 - 0.5 %    Neutrophils, Absolute 9.27 (H) 1.90 - 8.10 10*3/mm3    Lymphocytes, Absolute 2.06 0.90 - 4.80 10*3/mm3    Monocytes, Absolute 0.18 (L) 0.20 - 1.20 10*3/mm3    Eosinophils, Absolute 0.01 0.00 - 0.70 10*3/mm3    Basophils, Absolute 0.00 0.00 - 0.20 10*3/mm3    Immature Grans, Absolute 0.02 0.00 - 0.03 10*3/mm3   Protime-INR    Collection Time: 08/09/18 12:27 PM   Result Value Ref Range    Protime 14.7 (H) 11.7 - 14.2 Seconds    INR 1.17 (H) 0.90 - 1.10   aPTT    Collection Time: 08/09/18 12:27 PM   Result Value Ref Range    PTT 29.7 22.7 - 35.4 seconds       I ordered the above labs and reviewed the results        RADIOLOGY  XR Chest 1 View   Final Result    There is very extensive pulmonary metastatic disease with  ill-defined opacities scattered throughout both lungs that is similar to  the study of 12/21/2017. In addition, there is a moderate-sized apical  lateral pneumothorax on the left measuring up to 2.2 cm in thickness  that is a new finding. This represents an approximately 15%  pneumothorax. There is no significant mediastinal shift. The heart size  is normal. A right-sided MediPort catheter ends in the right atrium  without change.             I ordered the above noted radiological studies and reviewed the images on the PACS system.            MEDICATIONS GIVEN IN ER  Medications   sodium chloride 0.9 % flush 10 mL (not administered)           EKG  Interpreted by Dr. Gunderson (ER physician). Please see his interpretation.         PROCEDURES  Procedures          PROGRESS AND CONSULTS    Progress Notes:          10:57  "AM:  Blood work, CXR, and EKG ordered for further evaluation.     11:01 AM:  Reviewed pt's history and workup with Dr. Gunderson (ER physician).  After a bedside evaluation, Dr. Gunderson agrees with the plan of care.    11:55 AM:  Rechecked pt. Pt is resting comfortably and appears in no acute distress. Pt's O2 sat is currently 100% on room air.     12:04 PM:  Pt's CXR shows moderate-sized apical lateral pneumothorax on the left measuring up to 2.2 cm in thickness. This represents an approximately 15% pneumothorax.      Pt's pulmonologist is Dr. Avelar. Placed call to him to discuss further course of care.     12:11 PM:  Course of care was turned over to Dr. Gunderson. Pt's disposition is pending. Please see Dr. Gunderson's documentation for further details.           Disposition vitals:  BP 97/69   Pulse 67   Temp 99 °F (37.2 °C) (Oral)   Resp 20   Ht 167.6 cm (66\")   Wt 48.4 kg (106 lb 11.2 oz)   SpO2 100%   BMI 17.22 kg/m²           DIAGNOSIS  Final diagnoses:   Pneumothorax, unspecified type           DISPOSITION  Course of care was turned over to Dr. Gunderson, ER physician.             Documentation assistance provided by luis e Lopez for Mr. Rodney Robert PA-C.  Information recorded by the scribe was done at my direction and has been verified and validated by me.             Tyrone Lopez  08/09/18 2371       Rodney Robert PA  08/09/18 1523    "

## 2019-03-28 ENCOUNTER — HOSPITAL ENCOUNTER (OUTPATIENT)
Facility: HOSPITAL | Age: 44
Setting detail: OBSERVATION
Discharge: HOME HEALTH | End: 2019-03-29
Attending: EMERGENCY MEDICINE | Admitting: EMERGENCY MEDICINE

## 2019-03-28 ENCOUNTER — APPOINTMENT (OUTPATIENT)
Dept: GENERAL RADIOLOGY | Facility: HOSPITAL | Age: 44
End: 2019-03-28

## 2019-03-28 ENCOUNTER — APPOINTMENT (OUTPATIENT)
Dept: CT IMAGING | Facility: HOSPITAL | Age: 44
End: 2019-03-28

## 2019-03-28 DIAGNOSIS — C18.9 COLON CANCER METASTASIZED TO BONE (HCC): ICD-10-CM

## 2019-03-28 DIAGNOSIS — C79.51 COLON CANCER METASTASIZED TO BONE (HCC): ICD-10-CM

## 2019-03-28 DIAGNOSIS — R10.2 PELVIC PAIN: Primary | ICD-10-CM

## 2019-03-28 DIAGNOSIS — R18.8 PELVIC FLUID COLLECTION: ICD-10-CM

## 2019-03-28 LAB
ALBUMIN SERPL-MCNC: 3.8 G/DL (ref 3.5–5.2)
ALBUMIN/GLOB SERPL: 1.4 G/DL
ALP SERPL-CCNC: 118 U/L (ref 39–117)
ALT SERPL W P-5'-P-CCNC: 10 U/L (ref 1–33)
ANION GAP SERPL CALCULATED.3IONS-SCNC: 9.7 MMOL/L
AST SERPL-CCNC: 10 U/L (ref 1–32)
BASOPHILS # BLD AUTO: 0.01 10*3/MM3 (ref 0–0.2)
BASOPHILS NFR BLD AUTO: 0.2 % (ref 0–1.5)
BILIRUB SERPL-MCNC: 0.3 MG/DL (ref 0.2–1.2)
BUN BLD-MCNC: 10 MG/DL (ref 6–20)
BUN/CREAT SERPL: 15.2 (ref 7–25)
CALCIUM SPEC-SCNC: 8.6 MG/DL (ref 8.6–10.5)
CHLORIDE SERPL-SCNC: 104 MMOL/L (ref 98–107)
CO2 SERPL-SCNC: 25.3 MMOL/L (ref 22–29)
CREAT BLD-MCNC: 0.66 MG/DL (ref 0.57–1)
D-LACTATE SERPL-SCNC: 0.8 MMOL/L (ref 0.5–2)
DEPRECATED RDW RBC AUTO: 57.1 FL (ref 37–54)
EOSINOPHIL # BLD AUTO: 0.04 10*3/MM3 (ref 0–0.4)
EOSINOPHIL NFR BLD AUTO: 0.7 % (ref 0.3–6.2)
ERYTHROCYTE [DISTWIDTH] IN BLOOD BY AUTOMATED COUNT: 18.9 % (ref 12.3–15.4)
GFR SERPL CREATININE-BSD FRML MDRD: 99 ML/MIN/1.73
GLOBULIN UR ELPH-MCNC: 2.7 GM/DL
GLUCOSE BLD-MCNC: 102 MG/DL (ref 65–99)
HCG SERPL QL: NEGATIVE
HCT VFR BLD AUTO: 30.2 % (ref 34–46.6)
HGB BLD-MCNC: 9.2 G/DL (ref 12–15.9)
HOLD SPECIMEN: NORMAL
IMM GRANULOCYTES # BLD AUTO: 0.02 10*3/MM3 (ref 0–0.05)
IMM GRANULOCYTES NFR BLD AUTO: 0.4 % (ref 0–0.5)
LIPASE SERPL-CCNC: 29 U/L (ref 13–60)
LYMPHOCYTES # BLD AUTO: 1.66 10*3/MM3 (ref 0.7–3.1)
LYMPHOCYTES NFR BLD AUTO: 30.7 % (ref 19.6–45.3)
MAGNESIUM SERPL-MCNC: 1.9 MG/DL (ref 1.6–2.6)
MCH RBC QN AUTO: 26 PG (ref 26.6–33)
MCHC RBC AUTO-ENTMCNC: 30.5 G/DL (ref 31.5–35.7)
MCV RBC AUTO: 85.3 FL (ref 79–97)
MONOCYTES # BLD AUTO: 0.1 10*3/MM3 (ref 0.1–0.9)
MONOCYTES NFR BLD AUTO: 1.9 % (ref 5–12)
NEUTROPHILS # BLD AUTO: 3.57 10*3/MM3 (ref 1.4–7)
NEUTROPHILS NFR BLD AUTO: 66.1 % (ref 42.7–76)
NRBC BLD AUTO-RTO: 0 /100 WBC (ref 0–0)
PLATELET # BLD AUTO: 314 10*3/MM3 (ref 140–450)
PMV BLD AUTO: 9 FL (ref 6–12)
POTASSIUM BLD-SCNC: 3.5 MMOL/L (ref 3.5–5.2)
PROCALCITONIN SERPL-MCNC: <0.02 NG/ML (ref 0.1–0.25)
PROT SERPL-MCNC: 6.5 G/DL (ref 6–8.5)
RBC # BLD AUTO: 3.54 10*6/MM3 (ref 3.77–5.28)
SODIUM BLD-SCNC: 139 MMOL/L (ref 136–145)
WBC NRBC COR # BLD: 5.4 10*3/MM3 (ref 3.4–10.8)
WHOLE BLOOD HOLD SPECIMEN: NORMAL
WHOLE BLOOD HOLD SPECIMEN: NORMAL

## 2019-03-28 PROCEDURE — 99285 EMERGENCY DEPT VISIT HI MDM: CPT

## 2019-03-28 PROCEDURE — 25010000002 HYDROMORPHONE 1 MG/ML SOLUTION: Performed by: EMERGENCY MEDICINE

## 2019-03-28 PROCEDURE — 96374 THER/PROPH/DIAG INJ IV PUSH: CPT

## 2019-03-28 PROCEDURE — 74177 CT ABD & PELVIS W/CONTRAST: CPT

## 2019-03-28 PROCEDURE — G0378 HOSPITAL OBSERVATION PER HR: HCPCS

## 2019-03-28 PROCEDURE — 25010000002 IOPAMIDOL 61 % SOLUTION: Performed by: EMERGENCY MEDICINE

## 2019-03-28 PROCEDURE — 96375 TX/PRO/DX INJ NEW DRUG ADDON: CPT

## 2019-03-28 PROCEDURE — 25010000002 ONDANSETRON PER 1 MG: Performed by: EMERGENCY MEDICINE

## 2019-03-28 PROCEDURE — 84703 CHORIONIC GONADOTROPIN ASSAY: CPT | Performed by: EMERGENCY MEDICINE

## 2019-03-28 PROCEDURE — 83735 ASSAY OF MAGNESIUM: CPT | Performed by: EMERGENCY MEDICINE

## 2019-03-28 PROCEDURE — 96361 HYDRATE IV INFUSION ADD-ON: CPT

## 2019-03-28 PROCEDURE — 83605 ASSAY OF LACTIC ACID: CPT | Performed by: EMERGENCY MEDICINE

## 2019-03-28 PROCEDURE — 71046 X-RAY EXAM CHEST 2 VIEWS: CPT

## 2019-03-28 PROCEDURE — 80053 COMPREHEN METABOLIC PANEL: CPT | Performed by: EMERGENCY MEDICINE

## 2019-03-28 PROCEDURE — 84145 PROCALCITONIN (PCT): CPT | Performed by: EMERGENCY MEDICINE

## 2019-03-28 PROCEDURE — 85025 COMPLETE CBC W/AUTO DIFF WBC: CPT | Performed by: EMERGENCY MEDICINE

## 2019-03-28 PROCEDURE — 83690 ASSAY OF LIPASE: CPT | Performed by: EMERGENCY MEDICINE

## 2019-03-28 RX ORDER — ONDANSETRON 2 MG/ML
4 INJECTION INTRAMUSCULAR; INTRAVENOUS ONCE
Status: COMPLETED | OUTPATIENT
Start: 2019-03-28 | End: 2019-03-28

## 2019-03-28 RX ORDER — SODIUM CHLORIDE 0.9 % (FLUSH) 0.9 %
10 SYRINGE (ML) INJECTION AS NEEDED
Status: DISCONTINUED | OUTPATIENT
Start: 2019-03-28 | End: 2019-03-29 | Stop reason: HOSPADM

## 2019-03-28 RX ORDER — SODIUM CHLORIDE 9 MG/ML
125 INJECTION, SOLUTION INTRAVENOUS CONTINUOUS
Status: DISCONTINUED | OUTPATIENT
Start: 2019-03-28 | End: 2019-03-29 | Stop reason: HOSPADM

## 2019-03-28 RX ADMIN — ONDANSETRON 4 MG: 2 INJECTION INTRAMUSCULAR; INTRAVENOUS at 21:03

## 2019-03-28 RX ADMIN — SODIUM CHLORIDE 125 ML/HR: 9 INJECTION, SOLUTION INTRAVENOUS at 21:56

## 2019-03-28 RX ADMIN — IOPAMIDOL 85 ML: 612 INJECTION, SOLUTION INTRAVENOUS at 22:09

## 2019-03-28 RX ADMIN — SODIUM CHLORIDE 1000 ML: 9 INJECTION, SOLUTION INTRAVENOUS at 21:12

## 2019-03-28 RX ADMIN — HYDROMORPHONE HYDROCHLORIDE 1 MG: 1 INJECTION, SOLUTION INTRAMUSCULAR; INTRAVENOUS; SUBCUTANEOUS at 21:05

## 2019-03-29 VITALS
TEMPERATURE: 97.5 F | DIASTOLIC BLOOD PRESSURE: 75 MMHG | WEIGHT: 109.9 LBS | BODY MASS INDEX: 17.66 KG/M2 | HEIGHT: 66 IN | OXYGEN SATURATION: 99 % | SYSTOLIC BLOOD PRESSURE: 102 MMHG | RESPIRATION RATE: 16 BRPM | HEART RATE: 81 BPM

## 2019-03-29 PROBLEM — C78.01 SECONDARY MALIGNANT NEOPLASM OF RIGHT LUNG (HCC): Status: ACTIVE | Noted: 2018-04-03

## 2019-03-29 PROBLEM — R10.11 RIGHT UPPER QUADRANT PAIN: Status: ACTIVE | Noted: 2019-03-29

## 2019-03-29 PROBLEM — T45.1X5A ANEMIA DUE TO CHEMOTHERAPY: Status: ACTIVE | Noted: 2019-03-29

## 2019-03-29 PROBLEM — C18.9 COLON CANCER METASTASIZED TO MULTIPLE SITES (HCC): Status: ACTIVE | Noted: 2019-03-29

## 2019-03-29 PROBLEM — C78.6 SECONDARY MALIGNANT NEOPLASM OF RETROPERITONEUM AND PERITONEUM (HCC): Status: ACTIVE | Noted: 2018-04-03

## 2019-03-29 PROBLEM — R10.2 PELVIC PAIN: Status: ACTIVE | Noted: 2019-03-29

## 2019-03-29 PROBLEM — D64.81 ANEMIA DUE TO CHEMOTHERAPY: Status: ACTIVE | Noted: 2019-03-29

## 2019-03-29 PROBLEM — C78.7 SECONDARY ADENOCARCINOMA OF LIVER (HCC): Status: ACTIVE | Noted: 2018-04-03

## 2019-03-29 PROBLEM — C77.5 SECONDARY MALIGNANT NEOPLASM OF ILIAC LYMPH NODES (HCC): Status: ACTIVE | Noted: 2018-04-03

## 2019-03-29 LAB
ANION GAP SERPL CALCULATED.3IONS-SCNC: 8.1 MMOL/L
BACTERIA UR QL AUTO: ABNORMAL /HPF
BILIRUB UR QL STRIP: NEGATIVE
BUN BLD-MCNC: 7 MG/DL (ref 6–20)
BUN/CREAT SERPL: 13.5 (ref 7–25)
CALCIUM SPEC-SCNC: 8 MG/DL (ref 8.6–10.5)
CHLORIDE SERPL-SCNC: 109 MMOL/L (ref 98–107)
CLARITY UR: CLEAR
CO2 SERPL-SCNC: 22.9 MMOL/L (ref 22–29)
COLOR UR: YELLOW
CREAT BLD-MCNC: 0.52 MG/DL (ref 0.57–1)
DEPRECATED RDW RBC AUTO: 58.3 FL (ref 37–54)
ERYTHROCYTE [DISTWIDTH] IN BLOOD BY AUTOMATED COUNT: 18.6 % (ref 12.3–15.4)
GFR SERPL CREATININE-BSD FRML MDRD: 130 ML/MIN/1.73
GLUCOSE BLD-MCNC: 112 MG/DL (ref 65–99)
GLUCOSE UR STRIP-MCNC: NEGATIVE MG/DL
HCT VFR BLD AUTO: 28 % (ref 34–46.6)
HGB BLD-MCNC: 8.5 G/DL (ref 12–15.9)
HGB UR QL STRIP.AUTO: ABNORMAL
HYALINE CASTS UR QL AUTO: ABNORMAL /LPF
KETONES UR QL STRIP: NEGATIVE
LEUKOCYTE ESTERASE UR QL STRIP.AUTO: NEGATIVE
MCH RBC QN AUTO: 26.6 PG (ref 26.6–33)
MCHC RBC AUTO-ENTMCNC: 30.4 G/DL (ref 31.5–35.7)
MCV RBC AUTO: 87.5 FL (ref 79–97)
NITRITE UR QL STRIP: NEGATIVE
PH UR STRIP.AUTO: 5.5 [PH] (ref 5–8)
PLATELET # BLD AUTO: 258 10*3/MM3 (ref 140–450)
PMV BLD AUTO: 8.9 FL (ref 6–12)
POTASSIUM BLD-SCNC: 4.2 MMOL/L (ref 3.5–5.2)
PROT UR QL STRIP: NEGATIVE
RBC # BLD AUTO: 3.2 10*6/MM3 (ref 3.77–5.28)
RBC # UR: ABNORMAL /HPF
REF LAB TEST METHOD: ABNORMAL
SODIUM BLD-SCNC: 140 MMOL/L (ref 136–145)
SP GR UR STRIP: >=1.03 (ref 1–1.03)
SQUAMOUS #/AREA URNS HPF: ABNORMAL /HPF
UROBILINOGEN UR QL STRIP: ABNORMAL
WBC NRBC COR # BLD: 3.88 10*3/MM3 (ref 3.4–10.8)
WBC UR QL AUTO: ABNORMAL /HPF

## 2019-03-29 PROCEDURE — 81001 URINALYSIS AUTO W/SCOPE: CPT | Performed by: EMERGENCY MEDICINE

## 2019-03-29 PROCEDURE — G0378 HOSPITAL OBSERVATION PER HR: HCPCS

## 2019-03-29 PROCEDURE — 25010000002 HYDROMORPHONE PER 4 MG: Performed by: EMERGENCY MEDICINE

## 2019-03-29 PROCEDURE — 96361 HYDRATE IV INFUSION ADD-ON: CPT

## 2019-03-29 PROCEDURE — 80048 BASIC METABOLIC PNL TOTAL CA: CPT | Performed by: NURSE PRACTITIONER

## 2019-03-29 PROCEDURE — 85027 COMPLETE CBC AUTOMATED: CPT | Performed by: NURSE PRACTITIONER

## 2019-03-29 PROCEDURE — 96376 TX/PRO/DX INJ SAME DRUG ADON: CPT

## 2019-03-29 PROCEDURE — 25010000002 HYDROMORPHONE PER 4 MG: Performed by: INTERNAL MEDICINE

## 2019-03-29 RX ORDER — LORAZEPAM 0.5 MG/1
0.5 TABLET ORAL EVERY 4 HOURS PRN
Status: ON HOLD | COMMUNITY
End: 2019-06-21 | Stop reason: SDUPTHER

## 2019-03-29 RX ORDER — CYCLOPHOSPHAMIDE 50 MG/1
CAPSULE ORAL EVERY OTHER DAY
Status: ON HOLD | COMMUNITY
Start: 2019-01-07 | End: 2019-06-12

## 2019-03-29 RX ORDER — ONDANSETRON 4 MG/1
4 TABLET, FILM COATED ORAL EVERY 6 HOURS PRN
Status: DISCONTINUED | OUTPATIENT
Start: 2019-03-29 | End: 2019-03-29 | Stop reason: HOSPADM

## 2019-03-29 RX ORDER — SODIUM CHLORIDE 9 MG/ML
75 INJECTION, SOLUTION INTRAVENOUS CONTINUOUS
Status: DISCONTINUED | OUTPATIENT
Start: 2019-03-29 | End: 2019-03-29

## 2019-03-29 RX ORDER — ACETAMINOPHEN 325 MG/1
650 TABLET ORAL EVERY 4 HOURS PRN
Status: DISCONTINUED | OUTPATIENT
Start: 2019-03-29 | End: 2019-03-29 | Stop reason: HOSPADM

## 2019-03-29 RX ORDER — ONDANSETRON 2 MG/ML
4 INJECTION INTRAMUSCULAR; INTRAVENOUS EVERY 6 HOURS PRN
Status: DISCONTINUED | OUTPATIENT
Start: 2019-03-29 | End: 2019-03-29 | Stop reason: HOSPADM

## 2019-03-29 RX ORDER — SODIUM CHLORIDE 0.9 % (FLUSH) 0.9 %
1-10 SYRINGE (ML) INJECTION AS NEEDED
Status: DISCONTINUED | OUTPATIENT
Start: 2019-03-29 | End: 2019-03-29 | Stop reason: HOSPADM

## 2019-03-29 RX ORDER — HYDROMORPHONE HYDROCHLORIDE 1 MG/ML
0.5 INJECTION, SOLUTION INTRAMUSCULAR; INTRAVENOUS; SUBCUTANEOUS
Status: DISCONTINUED | OUTPATIENT
Start: 2019-03-29 | End: 2019-03-29 | Stop reason: HOSPADM

## 2019-03-29 RX ORDER — HYDROMORPHONE HYDROCHLORIDE 1 MG/ML
0.5 INJECTION, SOLUTION INTRAMUSCULAR; INTRAVENOUS; SUBCUTANEOUS ONCE
Status: COMPLETED | OUTPATIENT
Start: 2019-03-29 | End: 2019-03-29

## 2019-03-29 RX ORDER — MULTIVIT WITH MINERALS/LUTEIN
1000 TABLET ORAL DAILY
Status: ON HOLD | COMMUNITY
End: 2019-06-13

## 2019-03-29 RX ORDER — ONDANSETRON 4 MG/1
4 TABLET, ORALLY DISINTEGRATING ORAL EVERY 6 HOURS PRN
Status: DISCONTINUED | OUTPATIENT
Start: 2019-03-29 | End: 2019-03-29 | Stop reason: HOSPADM

## 2019-03-29 RX ORDER — SODIUM CHLORIDE 0.9 % (FLUSH) 0.9 %
3 SYRINGE (ML) INJECTION EVERY 12 HOURS SCHEDULED
Status: DISCONTINUED | OUTPATIENT
Start: 2019-03-29 | End: 2019-03-29 | Stop reason: HOSPADM

## 2019-03-29 RX ADMIN — HYDROMORPHONE HYDROCHLORIDE 0.5 MG: 1 INJECTION, SOLUTION INTRAMUSCULAR; INTRAVENOUS; SUBCUTANEOUS at 00:31

## 2019-03-29 RX ADMIN — HYDROMORPHONE HYDROCHLORIDE 0.5 MG: 1 INJECTION, SOLUTION INTRAMUSCULAR; INTRAVENOUS; SUBCUTANEOUS at 10:12

## 2019-03-29 RX ADMIN — SODIUM CHLORIDE 125 ML/HR: 9 INJECTION, SOLUTION INTRAVENOUS at 06:08

## 2019-03-29 RX ADMIN — HYDROMORPHONE HYDROCHLORIDE 0.5 MG: 1 INJECTION, SOLUTION INTRAMUSCULAR; INTRAVENOUS; SUBCUTANEOUS at 12:24

## 2019-03-29 RX ADMIN — Medication 3 ML: at 12:03

## 2019-03-29 RX ADMIN — HYDROMORPHONE HYDROCHLORIDE 0.5 MG: 1 INJECTION, SOLUTION INTRAMUSCULAR; INTRAVENOUS; SUBCUTANEOUS at 07:15

## 2019-03-29 RX ADMIN — HYDROMORPHONE HYDROCHLORIDE 0.5 MG: 1 INJECTION, SOLUTION INTRAMUSCULAR; INTRAVENOUS; SUBCUTANEOUS at 03:40

## 2019-03-30 ENCOUNTER — READMISSION MANAGEMENT (OUTPATIENT)
Dept: CALL CENTER | Facility: HOSPITAL | Age: 44
End: 2019-03-30

## 2019-03-31 NOTE — OUTREACH NOTE
Prep Survey      Responses   Facility patient discharged from?  [facility]   Is patient eligible?  No   What are the reasons patient is not eligible?  Other   Does the patient have one of the following disease processes/diagnoses(primary or secondary)?  Other   Prep survey completed?  Yes          Keila Brian RN

## 2019-04-06 ENCOUNTER — HOSPITAL ENCOUNTER (INPATIENT)
Facility: HOSPITAL | Age: 44
LOS: 4 days | Discharge: HOME | End: 2019-04-10
Attending: EMERGENCY MEDICINE | Admitting: OBSTETRICS & GYNECOLOGY

## 2019-04-06 DIAGNOSIS — R18.8 PELVIC FLUID COLLECTION: ICD-10-CM

## 2019-04-06 DIAGNOSIS — R10.2 PELVIC PAIN: Primary | ICD-10-CM

## 2019-04-06 DIAGNOSIS — Z85.038 HISTORY OF COLON CANCER: ICD-10-CM

## 2019-04-06 DIAGNOSIS — N83.201 CYST OF RIGHT OVARY: ICD-10-CM

## 2019-04-06 LAB
ANION GAP SERPL CALCULATED.3IONS-SCNC: 10.4 MMOL/L
BACTERIA UR QL AUTO: ABNORMAL /HPF
BASOPHILS # BLD AUTO: 0.01 10*3/MM3 (ref 0–0.2)
BASOPHILS NFR BLD AUTO: 0.2 % (ref 0–1.5)
BILIRUB UR QL STRIP: NEGATIVE
BUN BLD-MCNC: 7 MG/DL (ref 6–20)
BUN/CREAT SERPL: 12.1 (ref 7–25)
CALCIUM SPEC-SCNC: 8.9 MG/DL (ref 8.6–10.5)
CHLORIDE SERPL-SCNC: 109 MMOL/L (ref 98–107)
CLARITY UR: CLEAR
CO2 SERPL-SCNC: 19.6 MMOL/L (ref 22–29)
COLOR UR: YELLOW
CREAT BLD-MCNC: 0.58 MG/DL (ref 0.57–1)
DEPRECATED RDW RBC AUTO: 60.9 FL (ref 37–54)
EOSINOPHIL # BLD AUTO: 0.15 10*3/MM3 (ref 0–0.4)
EOSINOPHIL NFR BLD AUTO: 2.6 % (ref 0.3–6.2)
ERYTHROCYTE [DISTWIDTH] IN BLOOD BY AUTOMATED COUNT: 19.5 % (ref 12.3–15.4)
GFR SERPL CREATININE-BSD FRML MDRD: 115 ML/MIN/1.73
GLUCOSE BLD-MCNC: 92 MG/DL (ref 65–99)
GLUCOSE UR STRIP-MCNC: NEGATIVE MG/DL
HCT VFR BLD AUTO: 29.6 % (ref 34–46.6)
HGB BLD-MCNC: 8.6 G/DL (ref 12–15.9)
HGB UR QL STRIP.AUTO: ABNORMAL
HYALINE CASTS UR QL AUTO: ABNORMAL /LPF
IMM GRANULOCYTES # BLD AUTO: 0.04 10*3/MM3 (ref 0–0.05)
IMM GRANULOCYTES NFR BLD AUTO: 0.7 % (ref 0–0.5)
KETONES UR QL STRIP: NEGATIVE
LEUKOCYTE ESTERASE UR QL STRIP.AUTO: NEGATIVE
LYMPHOCYTES # BLD AUTO: 0.84 10*3/MM3 (ref 0.7–3.1)
LYMPHOCYTES NFR BLD AUTO: 14.4 % (ref 19.6–45.3)
MCH RBC QN AUTO: 25.7 PG (ref 26.6–33)
MCHC RBC AUTO-ENTMCNC: 29.1 G/DL (ref 31.5–35.7)
MCV RBC AUTO: 88.4 FL (ref 79–97)
MONOCYTES # BLD AUTO: 0.49 10*3/MM3 (ref 0.1–0.9)
MONOCYTES NFR BLD AUTO: 8.4 % (ref 5–12)
NEUTROPHILS # BLD AUTO: 4.29 10*3/MM3 (ref 1.4–7)
NEUTROPHILS NFR BLD AUTO: 73.7 % (ref 42.7–76)
NITRITE UR QL STRIP: NEGATIVE
NRBC BLD AUTO-RTO: 0 /100 WBC (ref 0–0)
PH UR STRIP.AUTO: 5.5 [PH] (ref 5–8)
PLATELET # BLD AUTO: 262 10*3/MM3 (ref 140–450)
PMV BLD AUTO: 9.1 FL (ref 6–12)
POTASSIUM BLD-SCNC: 4.1 MMOL/L (ref 3.5–5.2)
PROT UR QL STRIP: NEGATIVE
RBC # BLD AUTO: 3.35 10*6/MM3 (ref 3.77–5.28)
RBC # UR: ABNORMAL /HPF
REF LAB TEST METHOD: ABNORMAL
SODIUM BLD-SCNC: 139 MMOL/L (ref 136–145)
SP GR UR STRIP: 1.02 (ref 1–1.03)
SQUAMOUS #/AREA URNS HPF: ABNORMAL /HPF
UROBILINOGEN UR QL STRIP: ABNORMAL
WBC NRBC COR # BLD: 5.82 10*3/MM3 (ref 3.4–10.8)
WBC UR QL AUTO: ABNORMAL /HPF

## 2019-04-06 PROCEDURE — 25010000002 HYDROMORPHONE 1 MG/ML SOLUTION: Performed by: EMERGENCY MEDICINE

## 2019-04-06 PROCEDURE — G0378 HOSPITAL OBSERVATION PER HR: HCPCS

## 2019-04-06 PROCEDURE — 25010000002 ONDANSETRON PER 1 MG: Performed by: EMERGENCY MEDICINE

## 2019-04-06 PROCEDURE — 85025 COMPLETE CBC W/AUTO DIFF WBC: CPT | Performed by: EMERGENCY MEDICINE

## 2019-04-06 PROCEDURE — 99283 EMERGENCY DEPT VISIT LOW MDM: CPT

## 2019-04-06 PROCEDURE — 80048 BASIC METABOLIC PNL TOTAL CA: CPT | Performed by: EMERGENCY MEDICINE

## 2019-04-06 PROCEDURE — 25010000002 HYDROMORPHONE PER 4 MG: Performed by: OBSTETRICS & GYNECOLOGY

## 2019-04-06 PROCEDURE — 81001 URINALYSIS AUTO W/SCOPE: CPT | Performed by: EMERGENCY MEDICINE

## 2019-04-06 RX ORDER — SODIUM CHLORIDE 0.9 % (FLUSH) 0.9 %
3-10 SYRINGE (ML) INJECTION AS NEEDED
Status: DISCONTINUED | OUTPATIENT
Start: 2019-04-06 | End: 2019-04-10 | Stop reason: HOSPADM

## 2019-04-06 RX ORDER — SODIUM CHLORIDE 0.9 % (FLUSH) 0.9 %
10 SYRINGE (ML) INJECTION AS NEEDED
Status: DISCONTINUED | OUTPATIENT
Start: 2019-04-06 | End: 2019-04-10 | Stop reason: HOSPADM

## 2019-04-06 RX ORDER — SODIUM CHLORIDE 9 MG/ML
30 INJECTION, SOLUTION INTRAVENOUS CONTINUOUS
Status: DISCONTINUED | OUTPATIENT
Start: 2019-04-06 | End: 2019-04-07

## 2019-04-06 RX ORDER — SODIUM CHLORIDE 0.9 % (FLUSH) 0.9 %
3 SYRINGE (ML) INJECTION EVERY 12 HOURS SCHEDULED
Status: CANCELLED | OUTPATIENT
Start: 2019-04-06

## 2019-04-06 RX ORDER — LORAZEPAM 0.5 MG/1
0.5 TABLET ORAL DAILY
Status: DISCONTINUED | OUTPATIENT
Start: 2019-04-06 | End: 2019-04-07

## 2019-04-06 RX ORDER — ONDANSETRON 2 MG/ML
4 INJECTION INTRAMUSCULAR; INTRAVENOUS ONCE
Status: COMPLETED | OUTPATIENT
Start: 2019-04-06 | End: 2019-04-06

## 2019-04-06 RX ORDER — IBUPROFEN 600 MG/1
600 TABLET ORAL EVERY 6 HOURS PRN
Status: DISCONTINUED | OUTPATIENT
Start: 2019-04-06 | End: 2019-04-10 | Stop reason: HOSPADM

## 2019-04-06 RX ORDER — ONDANSETRON 2 MG/ML
4 INJECTION INTRAMUSCULAR; INTRAVENOUS EVERY 6 HOURS PRN
Status: DISCONTINUED | OUTPATIENT
Start: 2019-04-06 | End: 2019-04-10 | Stop reason: HOSPADM

## 2019-04-06 RX ORDER — HYDROMORPHONE HYDROCHLORIDE 1 MG/ML
0.5 INJECTION, SOLUTION INTRAMUSCULAR; INTRAVENOUS; SUBCUTANEOUS
Status: DISCONTINUED | OUTPATIENT
Start: 2019-04-06 | End: 2019-04-10 | Stop reason: HOSPADM

## 2019-04-06 RX ORDER — SODIUM CHLORIDE 9 MG/ML
100 INJECTION, SOLUTION INTRAVENOUS CONTINUOUS
Status: DISCONTINUED | OUTPATIENT
Start: 2019-04-06 | End: 2019-04-10 | Stop reason: HOSPADM

## 2019-04-06 RX ORDER — SENNOSIDES 8.6 MG
2 TABLET ORAL 2 TIMES DAILY PRN
Status: DISCONTINUED | OUTPATIENT
Start: 2019-04-06 | End: 2019-04-10 | Stop reason: HOSPADM

## 2019-04-06 RX ORDER — LIDOCAINE HYDROCHLORIDE 10 MG/ML
5 INJECTION, SOLUTION EPIDURAL; INFILTRATION; INTRACAUDAL; PERINEURAL AS NEEDED
Status: DISCONTINUED | OUTPATIENT
Start: 2019-04-06 | End: 2019-04-10 | Stop reason: HOSPADM

## 2019-04-06 RX ADMIN — HYDROMORPHONE HYDROCHLORIDE 0.5 MG: 1 INJECTION, SOLUTION INTRAMUSCULAR; INTRAVENOUS; SUBCUTANEOUS at 20:01

## 2019-04-06 RX ADMIN — SENNA 8.6 MG: 8.6 TABLET, COATED ORAL at 21:41

## 2019-04-06 RX ADMIN — HYDROMORPHONE HYDROCHLORIDE 0.5 MG: 1 INJECTION, SOLUTION INTRAMUSCULAR; INTRAVENOUS; SUBCUTANEOUS at 15:48

## 2019-04-06 RX ADMIN — HYDROMORPHONE HYDROCHLORIDE 1 MG: 1 INJECTION, SOLUTION INTRAMUSCULAR; INTRAVENOUS; SUBCUTANEOUS at 11:51

## 2019-04-06 RX ADMIN — HYDROMORPHONE HYDROCHLORIDE 0.5 MG: 1 INJECTION, SOLUTION INTRAMUSCULAR; INTRAVENOUS; SUBCUTANEOUS at 23:52

## 2019-04-06 RX ADMIN — LORAZEPAM 0.5 MG: 0.5 TABLET ORAL at 21:43

## 2019-04-06 RX ADMIN — SODIUM CHLORIDE 30 ML/HR: 9 INJECTION, SOLUTION INTRAVENOUS at 11:58

## 2019-04-06 RX ADMIN — SODIUM CHLORIDE 100 ML/HR: 9 INJECTION, SOLUTION INTRAVENOUS at 20:03

## 2019-04-06 RX ADMIN — ONDANSETRON 4 MG: 2 INJECTION INTRAMUSCULAR; INTRAVENOUS at 11:51

## 2019-04-06 NOTE — H&P
GYN History and Physical    4/6/2019 1:24 PM   MRN: [id number]   LMP: No LMP recorded. Patient is not currently having periods (Reason: Chemotherapy/radiation).    Ms. Vita Ren is a 43 y.o. No obstetric history on file. presenting for pelvic pain. This has been an ongoing issue for her for 1.5-2 years. She has a baseline level of pain at all times, but then occasionally has an acute exacerbation. At these times, she will require dilaudid for breakthrough pain. She has a history of colorectal cancer with distant metastases. Has undergone colon resection, chemotherapy, and cyber knife. She reports that recently, her team of cancer physician agreed that her pain is not cancer-related and that she should see her gynecologist for her long-standing history of ovarian cysts. She is currently under the care of Dr Ponce and is scheduled for surgery later this month. She was seen earlier this week in his office and was pain-free. Since that time, her pain has worsened to the point where she is all but bedridden. She gets up to use the restroom, but largely stays in bed most of the day. She began her cycle yesterday and feels as though that has made the pain worse. She is currently on daily micronor.      Review of Systems:  Gen: denies HA, fever, chills, fatigue, weight loss  Pulm: denies cough, SOB, wheezing  CV: denies palpitations, chest pain  ENT: denies sore throat, congestion  GI: Denies symptoms of reflux, +abdominal pain, diarrhea, +constipation  GYN: Denies vaginal discharge, abnormal bleeding, dysparunia  : Denies dysuria, frequency, hesitancy.  Integumentary: denies rashes, skin changes  Musculoskeletal: denies joint pain, muscle soreness  Psych: Mood overall good. Denies homicidal or suicidal ideation.    OB History   No data available         Past Medical History:   Diagnosis Date   • Cancer (CMS/HCC)     METASTATIC STAGE 4 COLORECTAL TO LIVER AND LUNGS   • Pneumothorax     RIGHT LUNG      Past Surgical  History:   Procedure Laterality Date   • CHEST TUBE INSERTION     • COLON RESECTION      COLORECTAL RESECTION       Current Facility-Administered Medications:   •  Access VAD, , , Once **AND** sodium chloride 0.9 % flush 10 mL, 10 mL, Intravenous, PRN, Derick Orta MD  •  sodium chloride 0.9 % infusion, 30 mL/hr, Intravenous, Continuous, Derick Orta MD, Last Rate: 30 mL/hr at 04/06/19 1158, 30 mL/hr at 04/06/19 1158    Current Outpatient Medications:   •  CBD (cannabidiol) oral oil, Take  by mouth Every Night., Disp: , Rfl:   •  cyclophosphamide 50 MG capsule, Take  by mouth Every Other Day., Disp: , Rfl:   •  dexamethasone (DECADRON) 2 MG tablet, Take 2 mg by mouth 2 (Two) Times a Day With Meals., Disp: , Rfl:   •  HYDROmorphone (DILAUDID) 2 MG tablet, Take 0.5 tablets by mouth Every 6 (Six) Hours As Needed for Severe Pain  for up to 12 doses., Disp: 6 tablet, Rfl: 0  •  lidocaine (LIDODERM) 5 %, Place 1 patch on the skin Daily. Remove & Discard patch within 12 hours or as directed by MD, Disp: , Rfl:   •  LORazepam (ATIVAN) 0.5 MG tablet, Take 0.5 mg by mouth Daily., Disp: , Rfl:   •  Multiple Vitamin (MULTIVITAMINS PO), Take  by mouth., Disp: , Rfl:   •  NUTRITIONAL SUPPLEMENTS PO, Take  by mouth., Disp: , Rfl:   •  ondansetron (ZOFRAN) 4 MG tablet, Take 1 tablet by mouth Every 6 (Six) Hours As Needed for Nausea or Vomiting., Disp: 12 tablet, Rfl: 0  •  PROBIOTIC PRODUCT PO, Take  by mouth Daily., Disp: , Rfl:   •  vitamin C (ASCORBIC ACID) 250 MG tablet, Take 1,000 mg by mouth Daily., Disp: , Rfl:   No Known Allergies  No family history on file.  Social History     Socioeconomic History   • Marital status:      Spouse name: Not on file   • Number of children: Not on file   • Years of education: Not on file   • Highest education level: Not on file   Tobacco Use   • Smoking status: Never Smoker   • Smokeless tobacco: Never Used   Substance and Sexual Activity   • Alcohol use: No   • Drug use: No   •  "Sexual activity: Defer       Physical Exam:  /77 (BP Location: Right arm, Patient Position: Lying)   Pulse 73   Temp 98.6 °F (37 °C) (Tympanic)   Resp 24   Ht 167.6 cm (66\")   Wt 50.8 kg (112 lb)   SpO2 94%   BMI 18.08 kg/m²   Gen: appears thin; while in the room discussing her cancer history, she converses without wincing and does not appear to be in distress  GI: non-gravid, midline vertical skin incision, mild TTP diffusely, no rebound/guarding  : deferred  MSK: normal gait, no c/c/e, normal range of motion of all extremities  Skin: no rashes or ulcers noted, normal skin turgor  Psych: alert and oriented x3; normal mood; good insight  Neuro: no gross cranial nerve deficits; normal sensation    Imaging    Reviewed pelvis ultrasound [facility] from [facility]    A/P: Vita Ren is a 43 y.o. No obstetric history on file. with history of stage 4 colorectal cancer and chronic pelvic pain, now with acute exacerbation    Will admit for pain control and observation. Based on her history and exam, I do not suspect that she is experiencing ovarian torsion at this time or has an acute abdomen. Will consider moving up her surgery date, but would not recommend taking her back emergently given the overall circumstances of her health. Regular diet today. NPO after midnight.    Patient understands and agrees with aforementioned plan.    Benito Paul MD    "

## 2019-04-06 NOTE — ED PROVIDER NOTES
" EMERGENCY DEPARTMENT ENCOUNTER    CHIEF COMPLAINT  Chief Complaint: Pelvic pain  History given by: Patient  History limited by: NA  Room Number: 11/11  Oncologist: Jase Warner      HPI:  Pt is a 43 y.o. female with history of stage for colon cancer with metastases scheduled for salpingo-oophorectomy 4/18 by Dr. Seven Poncewho presents complaining of worsening right-sided pelvic pain. Hong is not improved with pain medication patient has at home (hydromorphone 2mg q6h). Associated nausea w/o vomiting. Denies dysuria. Patient currently on menstrual cycle.     Duration/Onset/Timing: Worsened today  Location: Right side of pelvis  Radiation: NA  Quality: \"pain\"  Intensity/Severity: Moderate  Associated Symptoms: Nausea  Aggravating or Alleviating Factors: NA  Previous Episodes: Seen by Dr. 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   • Secondary malignant neoplasm of right lung (CMS/HCC) 04/03/2018   • 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    SOCIAL HISTORY  Social History     Socioeconomic History   • Marital status:      Spouse name: Not on file   • Number of children: Not on file   • Years of education: Not on file   • Highest education level: Not on file   Tobacco Use   • Smoking status: Never Smoker   • Smokeless tobacco: Never Used   Substance and Sexual Activity   • Alcohol use: No   • Drug use: No   • Sexual activity: Defer       ALLERGIES  Patient has no known allergies.    REVIEW OF SYSTEMS  Review of Systems   Constitutional: Negative for fever.   HENT: Negative for sore throat.    Eyes: Negative.    Respiratory: Negative for cough and shortness of breath.    Cardiovascular: Negative for chest pain.   Gastrointestinal: Positive for nausea. Negative for abdominal pain, diarrhea and vomiting.   Genitourinary: Positive for pelvic pain (right side). Negative for dysuria.   Musculoskeletal: Negative for neck pain.   Skin: Negative for rash.   Neurological: Negative for weakness, numbness and headaches.   Hematological: Negative.    Psychiatric/Behavioral: Negative.    All other systems reviewed and are negative.      PHYSICAL EXAM  ED Triage Vitals [04/06/19 1100]   Temp Heart Rate Resp BP SpO2   98.6 °F (37 °C) 73 24 -- 94 %      Temp src Heart Rate Source Patient Position BP Location FiO2 (%)   Tympanic Monitor -- -- --       Physical Exam   Constitutional: She is oriented to person, place, and time and well-developed, well-nourished, and in no distress.  Non-toxic appearance. No distress.   Appears uncomfortable    HENT:   Mouth/Throat: Mucous membranes are normal.   Eyes: EOM are normal.   Cardiovascular: Normal rate and regular rhythm. Exam reveals no gallop and no friction rub.   No murmur  heard.  Pulmonary/Chest: Effort normal. Tachypnea (mild) noted. No respiratory distress. She has no wheezes. She has no rhonchi. She has no rales.   Breath sounds are symmetric.   Abdominal: Soft. Bowel sounds are normal. She exhibits no distension. There is tenderness in the right lower quadrant. There is guarding (voluntary).   Musculoskeletal: Normal range of motion. She exhibits no deformity.   Neurological: She is alert and oriented to person, place, and time.   moving all extremities, no focal deficits   Skin: Skin is warm and dry.   Psychiatric:   Calm, cooperative       LAB RESULTS  Lab Results (last 24 hours)     Procedure Component Value Units Date/Time    CBC & Differential [[id number]] Collected:  04/06/19 1152    Specimen:  Blood Updated:  04/06/19 1206    Narrative:       The following orders were created for panel order CBC & Differential.  Procedure                               Abnormality         Status                     ---------                               -----------         ------                     CBC Auto Differential[[id number]]        Abnormal            Final result                 Please view results for these tests on the individual orders.    Basic Metabolic Panel [[id number]]  (Abnormal) Collected:  04/06/19 1152    Specimen:  Blood Updated:  04/06/19 1225     Glucose 92 mg/dL      BUN 7 mg/dL      Creatinine 0.58 mg/dL      Sodium 139 mmol/L      Potassium 4.1 mmol/L      Chloride 109 mmol/L      CO2 19.6 mmol/L      Calcium 8.9 mg/dL      eGFR Non African Amer 115 mL/min/1.73      BUN/Creatinine Ratio 12.1     Anion Gap 10.4 mmol/L     Narrative:       GFR Normal >60  Chronic Kidney Disease <60  Kidney Failure <15    CBC Auto Differential [[id number]]  (Abnormal) Collected:  04/06/19 1152    Specimen:  Blood Updated:  04/06/19 1206     WBC 5.82 10*3/mm3      RBC 3.35 10*6/mm3      Hemoglobin 8.6 g/dL      Hematocrit 29.6 %      MCV 88.4 fL      MCH 25.7 pg      MCHC 29.1 g/dL       RDW 19.5 %      RDW-SD 60.9 fl      MPV 9.1 fL      Platelets 262 10*3/mm3      Neutrophil % 73.7 %      Lymphocyte % 14.4 %      Monocyte % 8.4 %      Eosinophil % 2.6 %      Basophil % 0.2 %      Immature Grans % 0.7 %      Neutrophils, Absolute 4.29 10*3/mm3      Lymphocytes, Absolute 0.84 10*3/mm3      Monocytes, Absolute 0.49 10*3/mm3      Eosinophils, Absolute 0.15 10*3/mm3      Basophils, Absolute 0.01 10*3/mm3      Immature Grans, Absolute 0.04 10*3/mm3      nRBC 0.0 /100 WBC     Urinalysis With Microscopic If Indicated (No Culture) - Urine, Clean Catch [[id number]]  (Abnormal) Collected:  04/06/19 1221    Specimen:  Urine, Clean Catch Updated:  04/06/19 1251     Color, UA Yellow     Appearance, UA Clear     pH, UA 5.5     Specific Gravity, UA 1.020     Glucose, UA Negative     Ketones, UA Negative     Bilirubin, UA Negative     Blood, UA Large (3+)     Protein, UA Negative     Leuk Esterase, UA Negative     Nitrite, UA Negative     Urobilinogen, UA 0.2 E.U./dL    Urinalysis, Microscopic Only - Urine, Clean Catch [[id number]]  (Abnormal) Collected:  04/06/19 1221    Specimen:  Urine, Clean Catch Updated:  04/06/19 1251     RBC, UA 6-12 /HPF      WBC, UA 3-5 /HPF      Bacteria, UA None Seen /HPF      Squamous Epithelial Cells, UA 0-2 /HPF      Hyaline Casts, UA 0-2 /LPF      Methodology Automated Microscopy          I ordered the above labs and reviewed the results       PROCEDURES  Procedures      PROGRESS AND CONSULTS     1108: Plan- basic blood work, UA, dilaudid 1mg, zofran 4mg, and fluid bolus ordered. Will consult Dr. mAezcua.    1123: Spoke with Dr. Paul who is on call for Dr. Ponce. He will come evaluate patient when he is finished at [facility].     1125: Updated patient's  on plan for Dr. Paul to come evaluate patient in ED.     1145: Dr. Paul in ED evaluating patient when he was called out for a deliver. Will return to evaluate patient when he can.    1257: Dr. Paul back in ED evaluating  patient.     1316: Dr. Paul to admit patient overnight for pain control.    MEDICAL DECISION MAKING  Results were reviewed/discussed with the patient and they were also made aware of online access. Pt also made aware that some labs, such as cultures, will not be resulted during ER visit and follow up with PMD is necessary.     MDM  Number of Diagnoses or Management Options  Cyst of right ovary:   History of colon cancer:   Pelvic pain:   Diagnosis management comments: Patient with severe acute on chronic RLQ pelvic pain that has been extensively worked up and followed by her OB/GYN, most recently earlier this week with scheduled right oophorectomy on 4/18. Discussed case with Dr. Paul, on call for Dr. Ponce, who has evaluated at bedside and will be hospitalizing her today for pain control. Labs unremarkable at this point. Patient is improved after IV Dilaudid and IVF.        Amount and/or Complexity of Data Reviewed  Clinical lab tests: ordered and reviewed  Decide to obtain previous medical records or to obtain history from someone other than the patient: yes  Review and summarize past medical records: yes (Patient saw Dr. Amezcua on Monday for pelvic pain. Patient returned 2 days later for worsening pain and surgery was scheduled for 4/18 at [facility]. )           DIAGNOSIS  Final diagnoses:   Pelvic pain   History of colon cancer   Cyst of right ovary       DISPOSITION  ADMISSION    Discussed treatment plan and reason for admission with pt/family and admitting physician.  Pt/family voiced understanding of the plan for admission for further testing/treatment as needed.       Latest Documented Vital Signs:  As of 1:19 PM  BP- 141/77 HR- 73 Temp- 98.6 °F (37 °C) (Tympanic) O2 sat- 94%    --  Documentation assistance provided by luis e Jin for Dr. Derick Orta MD.  Information recorded by the luis e was done at my direction and has been verified and validated by me.                Cadence Jin  04/06/19  0208       Derick Orta MD  04/06/19 6242

## 2019-04-06 NOTE — ED TRIAGE NOTES
Pt reports rt pelvic region states supposed to have rt ovary removed by Dr. Jeffrey Boyd. Pt reports seen here last week for same thing told to come back if increased pain. Pt states she has stage 4 colon cancer with mets.

## 2019-04-07 LAB — B-HCG UR QL: NEGATIVE

## 2019-04-07 PROCEDURE — 81025 URINE PREGNANCY TEST: CPT | Performed by: OBSTETRICS & GYNECOLOGY

## 2019-04-07 PROCEDURE — 25010000002 HYDROMORPHONE PER 4 MG: Performed by: OBSTETRICS & GYNECOLOGY

## 2019-04-07 PROCEDURE — G0378 HOSPITAL OBSERVATION PER HR: HCPCS

## 2019-04-07 RX ORDER — LORAZEPAM 0.5 MG/1
0.5 TABLET ORAL 2 TIMES DAILY PRN
Status: DISCONTINUED | OUTPATIENT
Start: 2019-04-07 | End: 2019-04-10 | Stop reason: HOSPADM

## 2019-04-07 RX ORDER — ACETAMINOPHEN AND CODEINE PHOSPHATE 120; 12 MG/5ML; MG/5ML
0.35 SOLUTION ORAL DAILY
Status: DISCONTINUED | OUTPATIENT
Start: 2019-04-07 | End: 2019-04-10 | Stop reason: HOSPADM

## 2019-04-07 RX ADMIN — SENNA 17.2 MG: 8.6 TABLET, COATED ORAL at 20:05

## 2019-04-07 RX ADMIN — SODIUM CHLORIDE 100 ML/HR: 9 INJECTION, SOLUTION INTRAVENOUS at 16:15

## 2019-04-07 RX ADMIN — LORAZEPAM 0.5 MG: 0.5 TABLET ORAL at 10:05

## 2019-04-07 RX ADMIN — HYDROMORPHONE HYDROCHLORIDE 0.5 MG: 1 INJECTION, SOLUTION INTRAMUSCULAR; INTRAVENOUS; SUBCUTANEOUS at 20:01

## 2019-04-07 RX ADMIN — LORAZEPAM 0.5 MG: 0.5 TABLET ORAL at 21:22

## 2019-04-07 RX ADMIN — IBUPROFEN 600 MG: 600 TABLET, FILM COATED ORAL at 12:01

## 2019-04-07 RX ADMIN — HYDROMORPHONE HYDROCHLORIDE 0.5 MG: 1 INJECTION, SOLUTION INTRAMUSCULAR; INTRAVENOUS; SUBCUTANEOUS at 04:08

## 2019-04-07 RX ADMIN — SENNA 17.2 MG: 8.6 TABLET, COATED ORAL at 16:16

## 2019-04-07 RX ADMIN — HYDROMORPHONE HYDROCHLORIDE 0.5 MG: 1 INJECTION, SOLUTION INTRAMUSCULAR; INTRAVENOUS; SUBCUTANEOUS at 07:57

## 2019-04-07 RX ADMIN — HYDROMORPHONE HYDROCHLORIDE 0.5 MG: 1 INJECTION, SOLUTION INTRAMUSCULAR; INTRAVENOUS; SUBCUTANEOUS at 12:01

## 2019-04-07 RX ADMIN — SODIUM CHLORIDE 100 ML/HR: 9 INJECTION, SOLUTION INTRAVENOUS at 06:13

## 2019-04-07 RX ADMIN — ACETAMINOPHEN AND CODEINE PHOSPHATE 0.35 MG: 120; 12 SOLUTION ORAL at 12:06

## 2019-04-07 RX ADMIN — HYDROMORPHONE HYDROCHLORIDE 0.5 MG: 1 INJECTION, SOLUTION INTRAMUSCULAR; INTRAVENOUS; SUBCUTANEOUS at 16:36

## 2019-04-07 NOTE — PLAN OF CARE
Problem: Pain, Acute (Adult)  Goal: Acceptable Pain Control/Comfort Level  Outcome: Ongoing (interventions implemented as appropriate)      Problem: Patient Care Overview  Goal: Plan of Care Review  Outcome: Ongoing (interventions implemented as appropriate)   04/07/19 1442   OTHER   Outcome Summary vss, iv dilaudid for pain, MD will reassess in am , if needed for surgery      Goal: Individualization and Mutuality  Outcome: Ongoing (interventions implemented as appropriate)    Goal: Discharge Needs Assessment  Outcome: Ongoing (interventions implemented as appropriate)

## 2019-04-07 NOTE — PROGRESS NOTES
"Gyn Progress Note    S: Patient's patient well controlled on the IV dilaudid. She has been ambulatory. Last BM was yesterday. She remains on her cycle. Did not take her micronor yesterday.    Review of Systems:  Constitutional: denies HA, fever, chills, fatigue, weight loss  Eyes: denies discharge, erythema, vision loss  ENT: denies sore throat, congestion  Pulm: denies cough, SOB, wheezing  CV: denies palpitations, chest pain  GI: Denies symptoms of reflux, +abdominal pain, diarrhea, constipation  GYN/: Denies vaginal discharge, abnormal bleeding, dyspareunia; denies dysuria, frequency, hesitancy.  Integumentary: denies rashes, skin changes  Musculoskeletal: denies joint pain, muscle soreness  Psych: Mood overall good. Denies homicidal or suicidal ideation.  All other systems negative    Physical Exam:  /70 (BP Location: Right arm, Patient Position: Lying)   Pulse 78   Temp 97.6 °F (36.4 °C) (Oral)   Resp 18   Ht 167.6 cm (66\")   Wt 49.3 kg (108 lb 9.6 oz)   SpO2 100%   BMI 17.53 kg/m²   No LMP recorded. Patient is not currently having periods (Reason: Chemotherapy/radiation).   Constituitonal: no acute distress, normal appearance, converses with me easily without wincing or appearing in pain  GI: mild TTP, no rebound/guarding, distractable  : deferred  MSK: normal gait, no c/c/e, normal range of motion of all extremities  Skin: no rashes or ulcers noted, normal skin turgor  Psych: alert and oriented x3; normal mood; good insight  Neuro: no gross cranial nerve deficits; normal sensation    Imaging  US PELVIC, NON OB, TRANSVAGINAL ONLY                 US#[id number]   ----------------------------------------------------------------------  INDICATIONS        DUB    Pelvic pain   ----------------------------------------------------------------------  HISTORY       Age:   43  ----------------------------------------------------------------------  UTERUS       Uterus:     Present   Position:   Anteverted   " Size (cm)    L:  9.6       W:   5.57       H:  5.44  ----------------------------------------------------------------------  ENDOMETRIUM       Endometrium:          Visualized   Thickness(mm):        4.45   Echotexture:          Homogeneous      ----------------------------------------------------------------------  CERVIX       Normal appearance  ----------------------------------------------------------------------  CUL-DE-SAC       No fluid was visualized  ----------------------------------------------------------------------  RIGHT OVARY       Status:   Visualized   Size (cm)    L:  6.89      W:   6.35       H:  4.25   Vol (ml):    97.4   Morphology:    Multiple cysts       Comment:     Multiple anechoic cysts seen on right ovary.                Largest measures 5.9 x 5.7 x 4.0 cm. No                increased vascularity is seen around this cyst.  ----------------------------------------------------------------------  LEFT OVARY       Status:   Not Visualized       Comment:     No obvious cyst or mass seen in the adnexa.  ----------------------------------------------------------------------  COMMENTS       Excessive bowel gas seen on today's exam.       This exam should be considered preliminary until   viewed and signed by a physician.  This facility is   accredited by the [employer].  Please do not hesitate to contact us @   [phone] with any questions or concerns.      ----------------------------------------------------------------------             Amanda Carr RDMS        Impression:  Thin endometrium without evidence of abnormality.  Normal-appearing uterus.  Four simple cysts on the right ovary, largest of which is 6 centimeters.      Dictated by: Seven Ponce    4/2/2019  12:43 PM    A/P: Vitamargie Ren is a 43 y.o. No obstetric history on file. with history of stage 4 colorectal cancer, known simply cysts of the right ovary, and chronic pelvic pain, now with  acute exacerbation    Pain now under control. No indication for emergent surgery at this time. Patient offered an additional day of IV pain medications vs discharge home with oral medications. She would prefer an additional day in the hospital, as she obtains better pain control with the IV dilaudid compared to her oral regimen. Will have her restart the micronor. Discussed that it may be possible to move up her surgery date, but that it depends on multiple factors, including Dr Ponce's availability this week. She understands.     Patient understands and agrees with aforementioned planned.    Benito Paul MD

## 2019-04-07 NOTE — PLAN OF CARE
Problem: Pain, Acute (Adult)  Goal: Identify Related Risk Factors and Signs and Symptoms  Outcome: Outcome(s) achieved Date Met: 04/07/19    Goal: Acceptable Pain Control/Comfort Level  Outcome: Ongoing (interventions implemented as appropriate)      Problem: Patient Care Overview  Goal: Plan of Care Review  Outcome: Outcome(s) achieved Date Met: 04/07/19 04/07/19 0636   Coping/Psychosocial   Plan of Care Reviewed With patient   Plan of Care Review   Progress improving   OTHER   Outcome Summary adequate pain control on IV Dilaudid, no c/o nausea, up ad angela, IVF on flow, NPO post MN, for further care     Goal: Individualization and Mutuality  Outcome: Ongoing (interventions implemented as appropriate)    Goal: Discharge Needs Assessment  Outcome: Ongoing (interventions implemented as appropriate)

## 2019-04-08 PROBLEM — R18.8 PELVIC FLUID COLLECTION: Status: ACTIVE | Noted: 2019-04-08

## 2019-04-08 PROCEDURE — G0378 HOSPITAL OBSERVATION PER HR: HCPCS

## 2019-04-08 PROCEDURE — 25010000002 HYDROMORPHONE PER 4 MG: Performed by: OBSTETRICS & GYNECOLOGY

## 2019-04-08 PROCEDURE — 99222 1ST HOSP IP/OBS MODERATE 55: CPT | Performed by: SURGERY

## 2019-04-08 RX ADMIN — SENNA 8.6 MG: 8.6 TABLET, COATED ORAL at 20:10

## 2019-04-08 RX ADMIN — IBUPROFEN 600 MG: 600 TABLET, FILM COATED ORAL at 00:31

## 2019-04-08 RX ADMIN — SODIUM CHLORIDE 100 ML/HR: 9 INJECTION, SOLUTION INTRAVENOUS at 22:15

## 2019-04-08 RX ADMIN — HYDROMORPHONE HYDROCHLORIDE 0.5 MG: 1 INJECTION, SOLUTION INTRAMUSCULAR; INTRAVENOUS; SUBCUTANEOUS at 03:07

## 2019-04-08 RX ADMIN — HYDROMORPHONE HYDROCHLORIDE 0.5 MG: 1 INJECTION, SOLUTION INTRAMUSCULAR; INTRAVENOUS; SUBCUTANEOUS at 07:20

## 2019-04-08 RX ADMIN — LORAZEPAM 0.5 MG: 0.5 TABLET ORAL at 22:15

## 2019-04-08 RX ADMIN — ACETAMINOPHEN AND CODEINE PHOSPHATE 0.35 MG: 120; 12 SOLUTION ORAL at 08:54

## 2019-04-08 RX ADMIN — SENNA 8.6 MG: 8.6 TABLET, COATED ORAL at 12:34

## 2019-04-08 RX ADMIN — HYDROMORPHONE HYDROCHLORIDE 0.5 MG: 1 INJECTION, SOLUTION INTRAMUSCULAR; INTRAVENOUS; SUBCUTANEOUS at 12:28

## 2019-04-08 RX ADMIN — HYDROMORPHONE HYDROCHLORIDE 0.5 MG: 1 INJECTION, SOLUTION INTRAMUSCULAR; INTRAVENOUS; SUBCUTANEOUS at 16:47

## 2019-04-08 RX ADMIN — HYDROMORPHONE HYDROCHLORIDE 0.5 MG: 1 INJECTION, SOLUTION INTRAMUSCULAR; INTRAVENOUS; SUBCUTANEOUS at 20:10

## 2019-04-08 RX ADMIN — SODIUM CHLORIDE 100 ML/HR: 9 INJECTION, SOLUTION INTRAVENOUS at 12:30

## 2019-04-08 RX ADMIN — SODIUM CHLORIDE 100 ML/HR: 9 INJECTION, SOLUTION INTRAVENOUS at 01:36

## 2019-04-08 NOTE — PROGRESS NOTES
"Gyn Progress Note    S: Patient's pain reasonably well controlled on the IV dilaudid; she is nervous about switching to po. She has been ambulatory. Last BM was yesterday. She has less vaginal bleeding. Did not take her micronor yesterday or today.      Review of Systems:  Constitutional: denies HA, fever, chills, weight loss; endorses fatigue  Pulm: denies cough, SOB, wheezing  CV: denies palpitations, chest pain  GI: Denies symptoms of reflux, +abdominal pain, diarrhea, constipation  GYN/: Denies vaginal discharge, ; endorses pelvic pain  Integumentary: denies rashes, skin changes  Musculoskeletal: denies joint pain, muscle soreness  Psych: Mood overall good. Denies homicidal or suicidal ideation.  All other systems negative    Physical Exam:  /65 (BP Location: Left arm, Patient Position: Sitting)   Pulse 82   Temp 97.8 °F (36.6 °C) (Oral)   Resp 16   Ht 167.6 cm (66\")   Wt 49.3 kg (108 lb 9.6 oz)   SpO2 100%   BMI 17.53 kg/m²   No LMP recorded. Patient is not currently having periods (Reason: Chemotherapy/radiation).   Constituitonal: no acute distress, normal appearance, converses with me easily without wincing or appearing in pain; tearful  GI: minimal TTP, no rebound/guarding, distractable  : deferred  MSK: normal gait, no c/c/e, normal range of motion of all extremities  Skin: no rashes or ulcers noted, normal skin turgor  Psych: alert and oriented x3; normal mood; good insight  Neuro: no gross cranial nerve deficits; normal sensation    Imaging  US PELVIC, NON OB, TRANSVAGINAL ONLY                 US#[id number]   ----------------------------------------------------------------------  INDICATIONS        DUB    Pelvic pain   ----------------------------------------------------------------------  HISTORY       Age:   43  ----------------------------------------------------------------------  UTERUS       Uterus:     Present   Position:   Anteverted   Size (cm)    L:  9.6       W:   5.57       H:  " 5.44  ----------------------------------------------------------------------  ENDOMETRIUM       Endometrium:          Visualized   Thickness(mm):        4.45   Echotexture:          Homogeneous      ----------------------------------------------------------------------  CERVIX       Normal appearance  ----------------------------------------------------------------------  CUL-DE-SAC       No fluid was visualized  ----------------------------------------------------------------------  RIGHT OVARY       Status:   Visualized   Size (cm)    L:  6.89      W:   6.35       H:  4.25   Vol (ml):    97.4   Morphology:    Multiple cysts       Comment:     Multiple anechoic cysts seen on right ovary.                Largest measures 5.9 x 5.7 x 4.0 cm. No                increased vascularity is seen around this cyst.  ----------------------------------------------------------------------  LEFT OVARY       Status:   Not Visualized       Comment:     No obvious cyst or mass seen in the adnexa.  ----------------------------------------------------------------------  COMMENTS       Excessive bowel gas seen on today's exam.       This exam should be considered preliminary until   viewed and signed by a physician.  This facility is   accredited by the [employer] [place] [employer].  Please do not hesitate to contact us @   [phone] with any questions or concerns.      ----------------------------------------------------------------------             Amanda Carr RDMS        Impression:  Thin endometrium without evidence of abnormality.  Normal-appearing uterus.  Four simple cysts on the right ovary, largest of which is 6 centimeters.      Dictated by: Seven Ponce    4/2/2019  12:43 PM    Xr Chest 2 View    Result Date: 3/28/2019  PA AND LATERAL CHEST X-RAY  HISTORY: lower abdoinal pain  COMPARISON: 08/09/2018.  FINDINGS: PA and lateral views of the chest were obtained. The lungs are well inflated. Innumerable  irregular small nodules are present throughout the pulmonary parenchyma bilaterally likely related to known metastatic disease. Some irregular fibrotic changes and areas of scarring are also noted. Considering differences in technique, the findings may have worsened slightly in the right upper lobe/perihilar region but otherwise appears similar to previous. Heart size and vascularity are normal. No significant pleural fluid. Port-A-Cath unchanged.        Extensive scarring and metastatic disease which may have worsened slightly in the right upper lobe and perihilar region. Stable exam otherwise  This report was finalized on 3/28/2019 10:48 PM by Tony Huynh M.D.      Ct Abdomen Pelvis With Contrast    Result Date: 3/28/2019  CT SCANS ABDOMEN AND PELVIS WITH IV CONTRAST.  HISTORY: pelvic pain Hx of colon cancer with diffuse metastatic disease  COMPARISON: 05/04/2018, 12/21/2017.  TECHNIQUE: Radiation dose reduction techniques were utilized, including automated exposure control and exposure modulation based on body size. Axial images were obtained from the lung bases to the symphysis pubis with IV contrast only.. Oral contrast was not administered per request.  FINDINGS :  Innumerable spiculated nodules are again seen in the lung bases compatible with metastatic disease. Overall they have decreased slightly in size and number. There are a few wedge-shaped foci of diminished renal cortical enhancement on the right side which were not present on the previous exam. These findings could be related to some mild scarring or possibly mild pyelonephritis. Correlation with urinalysis should be able to confirm or exclude pyelonephritis. Remaining solid organs have an unremarkable appearance. Normal aorta.  There is an enhancing fluid collection in the perirectal, presacral soft tissues to the right of midline which was present on both of the previous examination but has increased in size. Current dimensions are 7.8 x 5.7 cm,  "previously 3.9 x 2.8 cm when measured in similar fashion. The collection does not contain air or air-fluid level to indicate a definite abscess although abscess cannot be entirely excluded. (It is not felt to reflect an adnexal mass as both ovaries were discretely separate, located much more anteriorly on the most recent comparison study.) Regardless follow-up will be needed.  The appendix is not identified with certainty on this exam without oral contrast. The GI tract not opacified for assessment but non obstructive in appearance. Some mild adenopathy is present in the common iliac chains bilaterally, right greater than left, but appears similar to previous. The largest is on the right side and measures 1.5 x 1.2 cm, not significantly changed from 1.5 x 1.3 cm.        IMPRESSION : 1. Slight decrease in size and number of pulmonary metastases. 2. Abnormal right renal nephrogram as discussed, suggest correlation with urinalysis to exclude pyelonephritis. 3. Enlarging enhancing fluid collection in the perirectal, presacral soft tissues as discussed. 4. Stable common iliac region adenopathy bilaterally.   This report was finalized on 3/28/2019 10:44 PM by Tony Huynh M.D.        Impression: Vita Ren is a 43 y.o. with history of stage 4 colorectal cancer, known simply cysts of the right ovary, and chronic pelvic pain, now with acute exacerbation. In review of the above imagine, I believe that I attributed her cysts to the right ovary (4/2/19) when in fact there were located in the pelvis, as the CT scan on 3/29 showed some distinction between the two areas: \"There is an enhancing fluid collection in the perirectal, presacral soft tissues to the right of midline which was present on both of the previous examination but has increased in size. Current dimensions are 7.8 x 5.7 cm... It is not felt to reflect an adnexal mass as both ovaries were discretely separate, located much more anteriorly on the most recent " "comparison study.\"    Pain now under control.  I spent 55 minutes at the bedside discussing my new interpretation of the imaging that the cystic formations in the right hemipelvis are likely NOT associated with the right ovary.  As such, I feel that RSO would not lead to pain relief.  She's frustrated but understands.  She still wants RSO.  RBA extensively reviewed, including likely non-resolution of pain.        Plan:  Consult to General surgery to see:   Is there a surgical indication for drainage/removal of R hemipelvis cystic formation    If not, plan for RSO as scheduled 4/18/19  Likely discharge home tomorrow with po Dilaudid.     Patient understands and agrees with aforementioned planned.    Seven Ponce MD  4/8/2019  12:05 PM      "

## 2019-04-08 NOTE — PLAN OF CARE
Problem: Pain, Acute (Adult)  Goal: Acceptable Pain Control/Comfort Level  Outcome: Ongoing (interventions implemented as appropriate)      Problem: Patient Care Overview  Goal: Plan of Care Review  Outcome: Ongoing (interventions implemented as appropriate)   04/08/19 3756   Coping/Psychosocial   Plan of Care Reviewed With patient   Plan of Care Review   Progress improving   OTHER   Outcome Summary gen surgery consulted but hasnt yet seen to evaluate if fluid needs drained from abd, if not sx required then plan for d/c tomorrow, R port accessed, pt taking IV dil, up ad angela, voiding     Goal: Individualization and Mutuality  Outcome: Ongoing (interventions implemented as appropriate)    Goal: Discharge Needs Assessment  Outcome: Ongoing (interventions implemented as appropriate)

## 2019-04-08 NOTE — CONSULTS
"Adult Nutrition  Assessment/PES    Patient Name:  Vita Ren  YOB: 1975  MRN: [id number]  Admit Date:  4/6/2019    Assessment Date:  4/8/2019    Comments:  Nutrition Screen: Low BMI 17.5. Intake ranging % of meals on regular diet. UBW noted at 112# with current wt of 108.5# not a significant change. No recommendations at present, will follow.     Reason for Assessment     Row Name 04/08/19 1455          Reason for Assessment    Reason For Assessment  identified at risk by screening criteria     Diagnosis  cancer diagnosis/related complications     Identified At Risk by Screening Criteria  BMI         Nutrition/Diet History     Row Name 04/08/19 1455          Nutrition/Diet History    Typical Food/Fluid Intake  Regular diet, intake % of meals. Possible wt loss.      Factors Affecting Nutritional Intake  abdominal pain Menstrual cycle         Anthropometrics     Row Name 04/08/19 1456          Anthropometrics    Height Method  measured     Height  167.6 cm (65.98\")     Current Weight Method  measured     Weight  49.3 kg (108 lb 11 oz)        Ideal Body Weight (IBW)    Ideal Body Weight (IBW) (kg)  59.54     % Ideal Body Weight  82.8        Usual Body Weight (UBW)    Usual Body Weight  50.8 kg (112 lb)     % Usual Body Weight  97.04        Body Mass Index (BMI)    BMI (kg/m2)  17.59     BMI Assessment  BMI 17-18.4: protein-energy malnutrition grade I                                        Labs/Tests/Procedures/Meds     Row Name 04/08/19 1457          Labs/Procedures/Meds    Lab Results Reviewed  reviewed, pertinent     Lab Results Comments  Cl 109H        Diagnostic Tests/Procedures    Diagnostic Test/Procedure Reviewed  reviewed        Medications    Pertinent Medications Reviewed  reviewed, pertinent     Pertinent Medications Comments  Micronor, 100ml/hr IV fluids         Physical Findings     Row Name 04/08/19 1458          Physical Findings    Overall Physical Appearance  underweight  " "       Estimated/Assessed Needs     Row Name 04/08/19 1459 04/08/19 1456       Calculation Measurements    Weight Used For Calculations  49.3 kg (108 lb 11 oz)  --    Height  --  167.6 cm (65.98\")       Estimated/Assessed Needs    Additional Documentation  Madison-St. Jeor Equation (Group)  --       KCAL/KG                            25 Kcal/Kg (kcal)  1232.5  --    30 Kcal/Kg (kcal)  1479  --    35 Kcal/Kg (kcal)  1725.5  --                         Madison-St. Jeor Equation    RMR (Madison-St. Jeor Equation)  1174.5  --       Fluid Requirements    Rainer-Reilly Method (over 20 kg)  2486  --        Nutrition Prescription Ordered     Row Name 04/08/19 1459          Nutrition Prescription PO    Current PO Diet  Regular     Fluid Consistency  Thin         Evaluation of Received Nutrient/Fluid Intake     Row Name 04/08/19 1459 04/08/19 1456       Calculation Measurements    Weight Used For Calculations  49.3 kg (108 lb 11 oz)  --    Height  --  167.6 cm (65.98\")        Evaluation of Prescribed Nutrient/Fluid Intake     Row Name 04/08/19 1459 04/08/19 1456       Calculation Measurements    Weight Used For Calculations  49.3 kg (108 lb 11 oz)  --    Height  --  167.6 cm (65.98\")            Problem/Interventions:  Problem 1     Row Name 04/08/19 1459          Nutrition Diagnoses Problem 1    Problem 1  Underweight     Etiology (related to)  Medical Diagnosis     Oncology  Colon cancer mets to liver, lung, bone     Signs/Symptoms (evidenced by)  BMI     BMI  17 - 17.9                 Intervention Goal     Row Name 04/08/19 1500          Intervention Goal    General  Meet nutritional needs for age/condition     PO  Maintain intake;Meet estimated needs     Weight  Maintain weight         Nutrition Intervention     Row Name 04/08/19 1501          Nutrition Intervention    RD/Tech Action  Follow Tx progress;Care plan reviewd           Education/Evaluation     Row Name 04/08/19 1501          Monitor/Evaluation    Monitor  PO " intake;Skin status;Weight     Education Follow-up  Reinforce PRN           Electronically signed by:  Archana Dillard MS,RD,LD  04/08/19 3:01 PM

## 2019-04-08 NOTE — DISCHARGE PLACEMENT REQUEST
"Mikal Mary (43 y.o. Female)     Date of Birth Social Security Number Address [place] Phone MRN    1975  [address] [id number]  [id number]    [place] Marital Status          Restoration        Admission Date Admission Type Admitting Provider Attending Provider Department, Room/Bed    4/6/19 Emergency Benito Paul MD Ryan, Nicholas Aaron, MD [address], [facility] [place]    Discharge Date Discharge Disposition Discharge Destination                       Attending Provider:  Benito Paul MD    Allergies:  No Known Allergies    Isolation:  None   Infection:  None   Code Status:  CPR    Ht:  167.6 cm (66\")   Wt:  49.3 kg (108 lb 9.6 oz)    Admission Cmt:  None   Principal Problem:  None                Active Insurance as of 4/6/2019     Primary Coverage     Payor Plan Insurance Group Employer/Plan Group    [facility] [id number]     Payor Plan Address Payor Plan Phone Number Payor Plan Fax Number Effective Dates    [address]   1/1/2017 - None Entered    [facility] [id number]       Subscriber Name Subscriber Birth Date Member ID       SHANTEL MENDOZA [birth year]1973 [id number]           Secondary Coverage     Payor Plan Insurance Group Employer/Plan Group    MEDICARE MEDICARE A & B      Payor Plan Address Payor Plan Phone Number Payor Plan Fax Number Effective Dates    [address] [phone]  4/1/2018 - None Entered    [facility] [id number]       Subscriber Name Subscriber Birth Date Member ID       MARY OVIEDO 1975 [id number]                 Emergency Contacts      (Rel.) Home Phone Work Phone Mobile Phone    Liang Oviedody (Spouse) -- -- [phone]              "

## 2019-04-08 NOTE — PLAN OF CARE
Problem: Pain, Acute (Adult)  Goal: Acceptable Pain Control/Comfort Level  Outcome: Ongoing (interventions implemented as appropriate)      Problem: Patient Care Overview  Goal: Plan of Care Review  Outcome: Ongoing (interventions implemented as appropriate)   04/08/19 0352   Coping/Psychosocial   Plan of Care Reviewed With patient   Plan of Care Review   Progress improving   OTHER   Outcome Summary VSS. C/o RLQ abd pain, medicated with IV dilaudid and ibuprofen with relief. Plan for MD to see today, evaluate need for surgery. R chest port accessed, IVF infusing. Resting well.      Goal: Individualization and Mutuality  Outcome: Ongoing (interventions implemented as appropriate)    Goal: Discharge Needs Assessment  Outcome: Ongoing (interventions implemented as appropriate)    Goal: Interprofessional Rounds/Family Conf  Outcome: Ongoing (interventions implemented as appropriate)

## 2019-04-08 NOTE — PROGRESS NOTES
Discharge Planning Assessment  [facility]     Patient Name: Vita Ren  MRN: [id number]  Today's Date: 4/8/2019    Admit Date: 4/6/2019    Discharge Needs Assessment     Row Name 04/08/19 1316       Living Environment    Lives With  spouse;child(emanuel), dependent    Current Living Arrangements  home/apartment/condo    Potentially Unsafe Housing Conditions  other (see comments) no concerns     Primary Care Provided by  self    Provides Primary Care For  child(emanuel)    Family Caregiver if Needed  spouse    Quality of Family Relationships  helpful;supportive    Able to Return to Prior Arrangements  yes       Resource/Environmental Concerns    Resource/Environmental Concerns  none    Transportation Concerns  car, none       Transition Planning    Patient/Family Anticipates Transition to  home;home with help/services    Patient/Family Anticipated Services at Transition  none    Transportation Anticipated  family or friend will provide       Discharge Needs Assessment    Concerns to be Addressed  no discharge needs identified;denies needs/concerns at this time    Equipment Currently Used at Home  oxygen    Anticipated Changes Related to Illness  none    Equipment Needed After Discharge  none        Discharge Plan     Row Name 04/08/19 1316       Plan    Plan  Home; [facility] following     Patient/Family in Agreement with Plan  yes    Plan Comments  CCP met with patient at bedside. CCP role explained and discharge planning discussed. Face sheet verified and CAMPA noted. Patient states she follows up with her oncologist, Dr. Warner. Patient lives with her spouse and two children (9 and 14 years old).Patient has home 02 (portable and PRN) through [employer]. Patient is independent with her ADLS and does not use DME. Patient states she wants to discuss with her MD about going home on IV fluids/meds.    Patient has used [facility] in the past and would like to use them again if needed. CCP spoke with Kamryn/KIRIT. Patient's preferred pharmacy is  [facility] [address]; patient denies any trouble affording her medications. CCP will follow for any needs to arise such as discharged on IV medications. [facility]         Destination      No service coordination in this encounter.      Durable Medical Equipment      No service coordination in this encounter.      Dialysis/Infusion      No service coordination in this encounter.      Home Medical Care      Service Provider Request Status Selected Services Address Phone Number Fax Number    [facility] Pending - Request Sent N/A [address] [address] [phone]      Therapy      No service coordination in this encounter.      Community Resources      No service coordination in this encounter.          Demographic Summary     Row Name 04/08/19 1315       General Information    Admission Type  observation    Arrived From  emergency department    Required Notices Provided  Observation Status Notice    Referral Source  admission list    Reason for Consult  discharge planning    Preferred Language  English     Used During This Interaction  no        Functional Status     Row Name 04/08/19 1316       Functional Status    Usual Activity Tolerance  good    Current Activity Tolerance  good       Functional Status, IADL    Medications  independent    Meal Preparation  independent    Housekeeping  independent    Laundry  independent    Shopping  independent       Mental Status    General Appearance WDL  WDL       Mental Status Summary    Recent Changes in Mental Status/Cognitive Functioning  no changes        Psychosocial    No documentation.       Abuse/Neglect    No documentation.       Legal    No documentation.       Substance Abuse    No documentation.       Patient Forms    No documentation.           ELSIE Shaffer

## 2019-04-09 ENCOUNTER — APPOINTMENT (OUTPATIENT)
Dept: CT IMAGING | Facility: HOSPITAL | Age: 44
End: 2019-04-09

## 2019-04-09 LAB
APTT PPP: 32 SECONDS (ref 22.7–35.4)
INR PPP: 1.16 (ref 0.9–1.1)
PROTHROMBIN TIME: 14.5 SECONDS (ref 11.7–14.2)

## 2019-04-09 PROCEDURE — 85610 PROTHROMBIN TIME: CPT | Performed by: SURGERY

## 2019-04-09 PROCEDURE — 87070 CULTURE OTHR SPECIMN AEROBIC: CPT | Performed by: SURGERY

## 2019-04-09 PROCEDURE — 25010000002 HYDROMORPHONE PER 4 MG: Performed by: OBSTETRICS & GYNECOLOGY

## 2019-04-09 PROCEDURE — 75989 ABSCESS DRAINAGE UNDER X-RAY: CPT

## 2019-04-09 PROCEDURE — 88305 TISSUE EXAM BY PATHOLOGIST: CPT | Performed by: SURGERY

## 2019-04-09 PROCEDURE — 87205 SMEAR GRAM STAIN: CPT | Performed by: SURGERY

## 2019-04-09 PROCEDURE — 99231 SBSQ HOSP IP/OBS SF/LOW 25: CPT | Performed by: SURGERY

## 2019-04-09 PROCEDURE — 88112 CYTOPATH CELL ENHANCE TECH: CPT | Performed by: SURGERY

## 2019-04-09 PROCEDURE — 0W9H30Z DRAINAGE OF RETROPERITONEUM WITH DRAINAGE DEVICE, PERCUTANEOUS APPROACH: ICD-10-PCS | Performed by: OBSTETRICS & GYNECOLOGY

## 2019-04-09 PROCEDURE — 85730 THROMBOPLASTIN TIME PARTIAL: CPT | Performed by: SURGERY

## 2019-04-09 PROCEDURE — 87015 SPECIMEN INFECT AGNT CONCNTJ: CPT | Performed by: SURGERY

## 2019-04-09 RX ORDER — SODIUM CHLORIDE 0.9 % (FLUSH) 0.9 %
1-10 SYRINGE (ML) INJECTION AS NEEDED
Status: DISCONTINUED | OUTPATIENT
Start: 2019-04-09 | End: 2019-04-10 | Stop reason: HOSPADM

## 2019-04-09 RX ORDER — SODIUM CHLORIDE 0.9 % (FLUSH) 0.9 %
3 SYRINGE (ML) INJECTION EVERY 12 HOURS SCHEDULED
Status: DISCONTINUED | OUTPATIENT
Start: 2019-04-09 | End: 2019-04-10 | Stop reason: HOSPADM

## 2019-04-09 RX ORDER — LIDOCAINE HYDROCHLORIDE 10 MG/ML
20 INJECTION, SOLUTION INFILTRATION; PERINEURAL ONCE
Status: COMPLETED | OUTPATIENT
Start: 2019-04-09 | End: 2019-04-09

## 2019-04-09 RX ADMIN — LIDOCAINE HYDROCHLORIDE 20 ML: 10 INJECTION, SOLUTION INFILTRATION; PERINEURAL at 14:19

## 2019-04-09 RX ADMIN — HYDROMORPHONE HYDROCHLORIDE 0.5 MG: 1 INJECTION, SOLUTION INTRAMUSCULAR; INTRAVENOUS; SUBCUTANEOUS at 22:01

## 2019-04-09 RX ADMIN — HYDROMORPHONE HYDROCHLORIDE 0.5 MG: 1 INJECTION, SOLUTION INTRAMUSCULAR; INTRAVENOUS; SUBCUTANEOUS at 18:24

## 2019-04-09 RX ADMIN — HYDROMORPHONE HYDROCHLORIDE 0.5 MG: 1 INJECTION, SOLUTION INTRAMUSCULAR; INTRAVENOUS; SUBCUTANEOUS at 00:02

## 2019-04-09 RX ADMIN — HYDROMORPHONE HYDROCHLORIDE 0.5 MG: 1 INJECTION, SOLUTION INTRAMUSCULAR; INTRAVENOUS; SUBCUTANEOUS at 15:44

## 2019-04-09 RX ADMIN — HYDROMORPHONE HYDROCHLORIDE 0.5 MG: 1 INJECTION, SOLUTION INTRAMUSCULAR; INTRAVENOUS; SUBCUTANEOUS at 10:35

## 2019-04-09 RX ADMIN — HYDROMORPHONE HYDROCHLORIDE 0.5 MG: 1 INJECTION, SOLUTION INTRAMUSCULAR; INTRAVENOUS; SUBCUTANEOUS at 05:58

## 2019-04-09 RX ADMIN — SODIUM CHLORIDE, PRESERVATIVE FREE 3 ML: 5 INJECTION INTRAVENOUS at 20:38

## 2019-04-09 RX ADMIN — SODIUM CHLORIDE 100 ML/HR: 9 INJECTION, SOLUTION INTRAVENOUS at 07:13

## 2019-04-09 RX ADMIN — ACETAMINOPHEN AND CODEINE PHOSPHATE 0.35 MG: 120; 12 SOLUTION ORAL at 09:04

## 2019-04-09 RX ADMIN — HYDROMORPHONE HYDROCHLORIDE 0.5 MG: 1 INJECTION, SOLUTION INTRAMUSCULAR; INTRAVENOUS; SUBCUTANEOUS at 13:35

## 2019-04-09 NOTE — PLAN OF CARE
Problem: Pain, Acute (Adult)  Goal: Acceptable Pain Control/Comfort Level  Outcome: Ongoing (interventions implemented as appropriate)      Problem: Patient Care Overview  Goal: Plan of Care Review  Outcome: Ongoing (interventions implemented as appropriate)   04/09/19 0354   Coping/Psychosocial   Plan of Care Reviewed With patient   Plan of Care Review   Progress no change   OTHER   Outcome Summary VSS. IV dilaudid given for abd pain. Plan for poss CT guided drainage today, Dr lovell would like pt to speak to her oncologist first. R port accessed, IVF continued. Up ad angela. Tolerating regular diet. Voiding well.      Goal: Individualization and Mutuality  Outcome: Ongoing (interventions implemented as appropriate)    Goal: Discharge Needs Assessment  Outcome: Ongoing (interventions implemented as appropriate)    Goal: Interprofessional Rounds/Family Conf  Outcome: Ongoing (interventions implemented as appropriate)

## 2019-04-09 NOTE — PROGRESS NOTES
"    Subjective: This afternon she reports that she is feeling well and denies any current concerns. Pain is well controlled with current pain medications after CT guided drainage of ~90 mL of pelvic fluid.  No drain placed. She is currently urinating spontaneously, ambulatory with little difficulty and tolerating PO. She denies any shortness of breath, lightheadedness or chest pain.     Review of Systems:  Constitutional: denies HA, fever, chills  Pulm: denies cough, SOB, wheezing  CV: denies palpitations, chest pain  Integumentary: denies rashes, skin changes  Musculoskeletal: denies joint pain, muscle soreness; still with pelvic pressure, but better.  Psych: Mood overall good. Denies homicidal or suicidal ideation.      Physical Exam:  /81 (BP Location: Right arm, Patient Position: Lying)   Pulse 101   Temp 97.3 °F (36.3 °C) (Oral)   Resp 16   Ht 167.6 cm (65.98\")   Wt 49.3 kg (108 lb 11 oz)   SpO2 97%   BMI 17.55 kg/m²   Constituitonal: no acute distress, normal appearance  GI: active bowel sounds; NT, no rebound/guarding  Skin: no rashes or ulcers noted, normal skin turgor  Psych: alert and oriented x3; normal mood; good insight      Assessment: Vita Ren is a 43 y.o. s/p drainage of pelvic fluid, with minimal residual fluid noted.  Path and micro pending, obviously.      Plan: Likely home tomorrow.  Will F/U with me in office in one week to see if pelvic pain is improved.    If so, will likely NOT proceed with LSC RSO   If not, will perform LSC RSO as scheduled 4/18    She agrees with and feeld hopeful about the aforementioned plan.  I appreciate the consultation and input from Dr. Hernandez.      Seven Ponce MD  4/9/2019  4:35 PM      "

## 2019-04-09 NOTE — PROGRESS NOTES
Chief Complaint:    Pelvic fluid collection    Subjective:    The patient is about the same, still requiring pain medications for pelvic pain.    Objective:    Temp:  [98.5 °F (36.9 °C)-98.7 °F (37.1 °C)] 98.5 °F (36.9 °C)  Heart Rate:  [78-91] 78  Resp:  [16] 16  BP: (100-123)/(70-76) 123/76    Physical Exam   Constitutional: She is cooperative. She does not appear ill. No distress.   Neurological: She is alert.       Results:    INR is 1.16.    Assessment/Plan:    The patient has a pelvic fluid Collection that may be the source of her ongoing right pelvic pain.  She has been scheduled for a CT scan guided drainage of the pelvic fluid collection.    Hubert Hernandez Jr., M.D.

## 2019-04-09 NOTE — CONSULTS
Inpatient General Surgery Consult  Consult performed by: Hubert Hernandez Jr., MD  Consult ordered by: Seven Ponce MD          Patient Care Team:  Jase Warner as PCP - General (Internal Medicine)    Chief complaint: Pelvic fluid collection    Subjective     History of Present Illness     The patient is a very pleasant 43-year-old female with a history of stage IV colon cancer.  She first underwent a low anterior resection in 2011 for a colon cancer that was likely at the proximal rectum or rectosigmoid colon.  The surgery was performed at [facility] and involved a resection with primary anastomosis with an apparent very low anastomosis.  She states that 23 of 25 nodes were positive.  She underwent chemotherapy and responded well.  She then had a period of about 3 years when there was no evidence of disease.  She was then diagnosed with metastatic disease to her along and liver and has been having multiple rounds of chemotherapy as well as radiation therapy since that time.  She has been diagnosed with spinal metastases and was treated with the CyberKnife radiation.    She has chronic pain in her pelvis that is primarily on the right side and she attributes it to ovarian cyst disease.  It is cyclical in nature with severe pain about once a month.  She uses multiple pain medications to try to control the pain when it is most intense.  It then improved and she is able to go off the pain medications.  It was felt that her pain may have a GYN origin and has seen Dr. Ponce who was planning a right salpingo-oophorectomy.    She has a chronic pelvic fluid collection that is on the right side of the pelvis and very low in nature.  A CT scan of the abdomen and pelvis performed on 3/28/2019 shows a cyst measuring 7.8 x 5.7 cm which has increased from 3.9 x 2.8 cm.  It has apparently been present for some time with no treatment performed.  Request was made for general surgery consultation to assess the  need to remove or drain the cyst.    Review of Systems   Constitutional: Negative for activity change, appetite change, fatigue and fever.   HENT: Negative for trouble swallowing and voice change.    Respiratory: Negative for chest tightness and shortness of breath.    Cardiovascular: Negative for chest pain and palpitations.   Gastrointestinal: Positive for abdominal pain. Negative for blood in stool, constipation, diarrhea, nausea and vomiting.   Endocrine: Negative for cold intolerance and heat intolerance.   Genitourinary: Negative for dysuria and flank pain.   Neurological: Negative for dizziness and light-headedness.   Hematological: Negative for adenopathy. Does not bruise/bleed easily.   Psychiatric/Behavioral: Negative for agitation and confusion.        Past Medical History:   Diagnosis Date   • Cancer (CMS/HCC)     METASTATIC STAGE 4 COLORECTAL TO LIVER AND LUNGS   • Pneumothorax     RIGHT LUNG   ,   Past Surgical History:   Procedure Laterality Date   • CHEST TUBE INSERTION     • COLON RESECTION      COLORECTAL RESECTION   , History reviewed. No pertinent family history.,   Social History     Tobacco Use   • Smoking status: Never Smoker   • Smokeless tobacco: Never Used   Substance Use Topics   • Alcohol use: No   • Drug use: No    and Allergies:  Patient has no known allergies.    Objective      Vital Signs  Temp:  [97.4 °F (36.3 °C)-98.6 °F (37 °C)] 98.6 °F (37 °C)  Heart Rate:  [82-92] 91  Resp:  [16] 16  BP: ()/(64-73) 109/73    Physical Exam   Constitutional: She is oriented to person, place, and time. She appears well-developed and well-nourished.  Non-toxic appearance.   Eyes: EOM are normal. No scleral icterus.   Pulmonary/Chest: Effort normal. No respiratory distress.   Abdominal: Soft. Normal appearance. There is tenderness (Mildly tender) in the right lower quadrant. There is no rebound and no guarding.   There is a well-healed midline incision.   Neurological: She is alert and oriented  to person, place, and time.   Skin: Skin is warm and dry.   Psychiatric: She has a normal mood and affect. Her behavior is normal. Judgment and thought content normal.       Results Review:    I reviewed the patient's new clinical results.        Assessment/Plan       Pelvic pain    Colon cancer metastasized to multiple sites including liver, lung and bone (CMS/HCC)    Pelvic fluid collection      Assessment & Plan     1.  Pelvic cystic fluid collection: The patient has a cystic fluid collection of the right pelvis that has gotten larger.  This may be contributing to her pelvic pain and is clearly amenable to a CT scan guided drainage.  If the patient is willing, I would recommend proceeding with a CT scan guided drainage of the fluid collection to assess its contents and adequately decompress it.  This will be discussed with the patient who will be in contact with her oncologist prior to proceeding with a CT scan guided drainage procedure.    I discussed the patients findings and my recommendations with patient    Hubert Hernandez Jr., MD  04/08/19  8:23 PM

## 2019-04-10 VITALS
HEIGHT: 66 IN | HEART RATE: 72 BPM | WEIGHT: 108.69 LBS | SYSTOLIC BLOOD PRESSURE: 105 MMHG | OXYGEN SATURATION: 98 % | TEMPERATURE: 97.8 F | DIASTOLIC BLOOD PRESSURE: 56 MMHG | BODY MASS INDEX: 17.47 KG/M2 | RESPIRATION RATE: 16 BRPM

## 2019-04-10 PROCEDURE — 25010000002 HYDROMORPHONE PER 4 MG: Performed by: OBSTETRICS & GYNECOLOGY

## 2019-04-10 RX ORDER — SODIUM CHLORIDE 0.9 % (FLUSH) 0.9 %
10 SYRINGE (ML) INJECTION AS NEEDED
Status: DISCONTINUED | OUTPATIENT
Start: 2019-04-10 | End: 2019-04-10 | Stop reason: HOSPADM

## 2019-04-10 RX ORDER — SODIUM CHLORIDE 0.9 % (FLUSH) 0.9 %
10 SYRINGE (ML) INJECTION EVERY 12 HOURS SCHEDULED
Status: DISCONTINUED | OUTPATIENT
Start: 2019-04-10 | End: 2019-04-10 | Stop reason: HOSPADM

## 2019-04-10 RX ORDER — SODIUM CHLORIDE 0.9 % (FLUSH) 0.9 %
20 SYRINGE (ML) INJECTION AS NEEDED
Status: DISCONTINUED | OUTPATIENT
Start: 2019-04-10 | End: 2019-04-10 | Stop reason: HOSPADM

## 2019-04-10 RX ADMIN — SODIUM CHLORIDE 100 ML/HR: 9 INJECTION, SOLUTION INTRAVENOUS at 04:12

## 2019-04-10 RX ADMIN — SODIUM CHLORIDE, PRESERVATIVE FREE 10 ML: 5 INJECTION INTRAVENOUS at 10:10

## 2019-04-10 RX ADMIN — IBUPROFEN 600 MG: 600 TABLET, FILM COATED ORAL at 08:41

## 2019-04-10 RX ADMIN — Medication 500 UNITS: at 10:10

## 2019-04-10 RX ADMIN — HYDROMORPHONE HYDROCHLORIDE 0.5 MG: 1 INJECTION, SOLUTION INTRAMUSCULAR; INTRAVENOUS; SUBCUTANEOUS at 03:57

## 2019-04-10 RX ADMIN — ACETAMINOPHEN AND CODEINE PHOSPHATE 0.35 MG: 120; 12 SOLUTION ORAL at 08:37

## 2019-04-10 NOTE — DISCHARGE SUMMARY
Admitted with pelvic pain.  CT demonstrated no ovarian cyst, but right sided fluid collection.  Consultation with Dr. Hernandez from Gen Surg, and underwent CT guided drainage of 90 mL of clear fluid.  Pain improved, but not resolved.  IV Dilaudid for pain relief while inpatient, has po at home already, but is going to try ibuprofen and CBD/TCH, as that's worked well in the past for pain control.    Plan to F/U in one week with me to re-assess RSO, already scheduled for 8 days.     Seven Ponce MD  4/10/2019  8:47 AM

## 2019-04-10 NOTE — PROGRESS NOTES
Discharge Planning Assessment  [facility]     Patient Name: Vita Ren  MRN: [id number]  Today's Date: 4/10/2019    Admit Date: 4/6/2019    Discharge Needs Assessment    No documentation.       Discharge Plan     Row Name 04/10/19 0854       Plan    Plan Comments  I spoke with pt, she said she will be returning home today and does not anticipate any needs.  Kamryn with [facility] notified of discharge and no home health order.     Joann Alfaro RN    Final Discharge Disposition Code  01 - home or self-care     Home Medical Care      Service Provider Request Status Selected Services Address Phone Number Fax Number    [facility] [address] [facility] [address] [address] [phone]        Expected Discharge Date and Time     Expected Discharge Date Expected Discharge Time    Apr 10, 2019            Joann Alfaro, RN

## 2019-04-10 NOTE — PLAN OF CARE
Problem: Pain, Acute (Adult)  Goal: Acceptable Pain Control/Comfort Level  Outcome: Ongoing (interventions implemented as appropriate)      Problem: Patient Care Overview  Goal: Plan of Care Review  Outcome: Ongoing (interventions implemented as appropriate)   04/10/19 0434   Coping/Psychosocial   Plan of Care Reviewed With patient   Plan of Care Review   Progress improving   OTHER   Outcome Summary VSS. Pain well controlled with IV Dilaudid and ice pack. Up ad angela, voided freely. Slept well.      Goal: Discharge Needs Assessment  Outcome: Ongoing (interventions implemented as appropriate)    Goal: Interprofessional Rounds/Family Conf  Outcome: Ongoing (interventions implemented as appropriate)

## 2019-04-11 ENCOUNTER — READMISSION MANAGEMENT (OUTPATIENT)
Dept: CALL CENTER | Facility: HOSPITAL | Age: 44
End: 2019-04-11

## 2019-04-11 LAB
LAB AP CASE REPORT: NORMAL
PATH REPORT.FINAL DX SPEC: NORMAL
PATH REPORT.GROSS SPEC: NORMAL

## 2019-04-12 ENCOUNTER — READMISSION MANAGEMENT (OUTPATIENT)
Dept: CALL CENTER | Facility: HOSPITAL | Age: 44
End: 2019-04-12

## 2019-04-12 NOTE — OUTREACH NOTE
Prep Survey      Responses   Facility patient discharged from?  [place]   Is patient eligible?  Yes   Discharge diagnosis  pelvc painunderwent CT guided drainage of 90 mL of clear fluid.     Does the patient have one of the following disease processes/diagnoses(primary or secondary)?  General Surgery   Does the patient have Home health ordered?  No   Is there a DME ordered?  No   Prep survey completed?  Yes          Ashley Payne RN        
Patient/Caregiver provided printed discharge information.

## 2019-04-12 NOTE — OUTREACH NOTE
General Surgery Week 1 Survey      Responses   Facility patient discharged from?  [place]   Does the patient have one of the following disease processes/diagnoses(primary or secondary)?  General Surgery   Is there a successful TCM telephone encounter documented?  No   Week 1 attempt successful?  No   Unsuccessful attempts  Attempt 1          Lexie Chavis RN

## 2019-04-14 LAB
BACTERIA FLD CULT: NORMAL
GRAM STN SPEC: NORMAL

## 2019-04-15 ENCOUNTER — READMISSION MANAGEMENT (OUTPATIENT)
Dept: CALL CENTER | Facility: HOSPITAL | Age: 44
End: 2019-04-15

## 2019-04-15 NOTE — OUTREACH NOTE
General Surgery Week 1 Survey      Responses   Facility patient discharged from?  [place]   Does the patient have one of the following disease processes/diagnoses(primary or secondary)?  General Surgery   Is there a successful TCM telephone encounter documented?  No   Week 1 attempt successful?  No   Unsuccessful attempts  Attempt 2          Sanchez Manley RN

## 2019-04-18 ENCOUNTER — READMISSION MANAGEMENT (OUTPATIENT)
Dept: CALL CENTER | Facility: HOSPITAL | Age: 44
End: 2019-04-18

## 2019-04-18 NOTE — OUTREACH NOTE
General Surgery Week 2 Survey      Responses   Facility patient discharged from?  [place]   Does the patient have one of the following disease processes/diagnoses(primary or secondary)?  General Surgery   Week 2 attempt successful?  No   Unsuccessful attempts  Attempt 1          Sanchez Manley RN

## 2019-04-22 ENCOUNTER — READMISSION MANAGEMENT (OUTPATIENT)
Dept: CALL CENTER | Facility: HOSPITAL | Age: 44
End: 2019-04-22

## 2019-04-22 NOTE — OUTREACH NOTE
General Surgery Week 2 Survey      Responses   Facility patient discharged from?  [place]   Does the patient have one of the following disease processes/diagnoses(primary or secondary)?  General Surgery   Week 2 attempt successful?  Yes   Call start time  1128   Call end time  1136   Meds reviewed with patient/caregiver?  Yes   Is the patient having any side effects they believe may be caused by any medication additions or changes?  No   Does the patient have all medications related to this admission filled (includes all antibiotics, pain medications, etc.)  Yes   Is the patient taking all medications as directed (includes completed medication regime)?  Yes   Does the patient have a follow up appointment scheduled with their surgeon?  Yes   Has the patient kept scheduled appointments due by today?  N/A   Comments  Pt had ovaries removed 4/18/19 at [facility]   Psychosocial issues?  No   Did the patient receive a copy of their discharge instructions?  Yes   Nursing interventions  Reviewed instructions with patient   What is the patient's perception of their health status since discharge?  Same [Pt. has had ovaries removed since this admission]   Nursing interventions  Nurse provided patient education   Is the patient /caregiver able to teach back basic post-op care?  Lifting as instructed by MD in discharge instructions, No tub bath, swimming, or hot tub until instructed by MD, Take showers only when approved by MD-sponge bathe until then, Drive as instructed by MD in discharge instructions, Continue use of incentive spirometry at least 1 week post discharge, Keep incision areas clean,dry and protected   Is the patient/caregiver able to teach back signs and symptoms of incisional infection?  Increased redness, swelling or pain at the incisonal site, Increased drainage or bleeding, Incisional warmth, Pus or odor from incision, Fever   Is the patient/caregiver able to teach back steps to recovery at home?   Set small, achievable goals for return to baseline health, Rest and rebuild strength, gradually increase activity, Eat a well-balance diet, Practice good oral hygiene   Week 2 call completed?  Yes          Lexie Saxena RN

## 2019-04-29 ENCOUNTER — READMISSION MANAGEMENT (OUTPATIENT)
Dept: CALL CENTER | Facility: HOSPITAL | Age: 44
End: 2019-04-29

## 2019-04-29 NOTE — OUTREACH NOTE
General Surgery Week 3 Survey      Responses   Facility patient discharged from?  [place]   Does the patient have one of the following disease processes/diagnoses(primary or secondary)?  General Surgery   Week 3 attempt successful?  Yes   Call start time  1411   Call end time  1414   Discharge diagnosis  pelvc painunderwent CT guided drainage of 90 mL of clear fluid.     Meds reviewed with patient/caregiver?  No   Is the patient taking all medications as directed (includes completed medication regime)?  Yes   Does the patient have a follow up appointment scheduled with their surgeon?  Yes   Has the patient kept scheduled appointments due by today?  N/A   Comments  Pt will see OBGYN on wednesday and scheduling an ablation procedure.   Psychosocial issues?  No   What is the patient's perception of their health status since discharge?  Improving   Additional teach back comments  Pt states she is doing well all things considering.   Week 3 call completed?  Yes   Revoked  No further contact(revokes)-requires comment   Graduated/Revoked comments  Pt states she is doing well appreciative of all call and states no more calls are needed at this time.          Keila Brian, RN

## 2019-05-09 ENCOUNTER — HOSPITAL ENCOUNTER (EMERGENCY)
Facility: HOSPITAL | Age: 44
Discharge: HOME | End: 2019-05-09
Attending: EMERGENCY MEDICINE | Admitting: EMERGENCY MEDICINE

## 2019-05-09 ENCOUNTER — APPOINTMENT (OUTPATIENT)
Dept: GENERAL RADIOLOGY | Facility: HOSPITAL | Age: 44
End: 2019-05-09

## 2019-05-09 ENCOUNTER — APPOINTMENT (OUTPATIENT)
Dept: CT IMAGING | Facility: HOSPITAL | Age: 44
End: 2019-05-09

## 2019-05-09 VITALS
TEMPERATURE: 98 F | HEIGHT: 66 IN | OXYGEN SATURATION: 97 % | SYSTOLIC BLOOD PRESSURE: 111 MMHG | RESPIRATION RATE: 18 BRPM | HEART RATE: 66 BPM | DIASTOLIC BLOOD PRESSURE: 63 MMHG | BODY MASS INDEX: 17.19 KG/M2 | WEIGHT: 107 LBS

## 2019-05-09 DIAGNOSIS — R07.89 CHEST PAIN, ATYPICAL: Primary | ICD-10-CM

## 2019-05-09 LAB
ALBUMIN SERPL-MCNC: 4.9 G/DL (ref 3.5–5.2)
ALBUMIN/GLOB SERPL: 1.7 G/DL
ALP SERPL-CCNC: 89 U/L (ref 39–117)
ALT SERPL W P-5'-P-CCNC: 8 U/L (ref 1–33)
ANION GAP SERPL CALCULATED.3IONS-SCNC: 10.9 MMOL/L
APTT PPP: 26.7 SECONDS (ref 22.7–35.4)
AST SERPL-CCNC: 17 U/L (ref 1–32)
BASOPHILS # BLD AUTO: 0.02 10*3/MM3 (ref 0–0.2)
BASOPHILS NFR BLD AUTO: 0.2 % (ref 0–1.5)
BILIRUB SERPL-MCNC: 0.4 MG/DL (ref 0.2–1.2)
BUN BLD-MCNC: 13 MG/DL (ref 6–20)
BUN/CREAT SERPL: 20 (ref 7–25)
CALCIUM SPEC-SCNC: 8.5 MG/DL (ref 8.6–10.5)
CHLORIDE SERPL-SCNC: 103 MMOL/L (ref 98–107)
CO2 SERPL-SCNC: 24.1 MMOL/L (ref 22–29)
CREAT BLD-MCNC: 0.65 MG/DL (ref 0.57–1)
DEPRECATED RDW RBC AUTO: 57.1 FL (ref 37–54)
EOSINOPHIL # BLD AUTO: 0 10*3/MM3 (ref 0–0.4)
EOSINOPHIL NFR BLD AUTO: 0 % (ref 0.3–6.2)
ERYTHROCYTE [DISTWIDTH] IN BLOOD BY AUTOMATED COUNT: 18.6 % (ref 12.3–15.4)
GFR SERPL CREATININE-BSD FRML MDRD: 100 ML/MIN/1.73
GLOBULIN UR ELPH-MCNC: 2.9 GM/DL
GLUCOSE BLD-MCNC: 134 MG/DL (ref 65–99)
HCT VFR BLD AUTO: 33.1 % (ref 34–46.6)
HGB BLD-MCNC: 9.7 G/DL (ref 12–15.9)
IMM GRANULOCYTES # BLD AUTO: 0.08 10*3/MM3 (ref 0–0.05)
IMM GRANULOCYTES NFR BLD AUTO: 0.7 % (ref 0–0.5)
INR PPP: 1.02 (ref 0.9–1.1)
LIPASE SERPL-CCNC: 36 U/L (ref 13–60)
LYMPHOCYTES # BLD AUTO: 0.57 10*3/MM3 (ref 0.7–3.1)
LYMPHOCYTES NFR BLD AUTO: 5.1 % (ref 19.6–45.3)
MCH RBC QN AUTO: 24.7 PG (ref 26.6–33)
MCHC RBC AUTO-ENTMCNC: 29.3 G/DL (ref 31.5–35.7)
MCV RBC AUTO: 84.4 FL (ref 79–97)
MONOCYTES # BLD AUTO: 0.33 10*3/MM3 (ref 0.1–0.9)
MONOCYTES NFR BLD AUTO: 2.9 % (ref 5–12)
NEUTROPHILS # BLD AUTO: 10.19 10*3/MM3 (ref 1.7–7)
NEUTROPHILS NFR BLD AUTO: 91.1 % (ref 42.7–76)
NRBC BLD AUTO-RTO: 0 /100 WBC (ref 0–0.2)
PLATELET # BLD AUTO: 303 10*3/MM3 (ref 140–450)
PMV BLD AUTO: 9.2 FL (ref 6–12)
POTASSIUM BLD-SCNC: 4.2 MMOL/L (ref 3.5–5.2)
PROT SERPL-MCNC: 7.8 G/DL (ref 6–8.5)
PROTHROMBIN TIME: 13.1 SECONDS (ref 11.7–14.2)
RBC # BLD AUTO: 3.92 10*6/MM3 (ref 3.77–5.28)
SODIUM BLD-SCNC: 138 MMOL/L (ref 136–145)
WBC NRBC COR # BLD: 11.19 10*3/MM3 (ref 3.4–10.8)

## 2019-05-09 PROCEDURE — 71045 X-RAY EXAM CHEST 1 VIEW: CPT

## 2019-05-09 PROCEDURE — 83690 ASSAY OF LIPASE: CPT | Performed by: EMERGENCY MEDICINE

## 2019-05-09 PROCEDURE — 96376 TX/PRO/DX INJ SAME DRUG ADON: CPT

## 2019-05-09 PROCEDURE — 85025 COMPLETE CBC W/AUTO DIFF WBC: CPT | Performed by: EMERGENCY MEDICINE

## 2019-05-09 PROCEDURE — 96361 HYDRATE IV INFUSION ADD-ON: CPT

## 2019-05-09 PROCEDURE — 85730 THROMBOPLASTIN TIME PARTIAL: CPT | Performed by: EMERGENCY MEDICINE

## 2019-05-09 PROCEDURE — 25010000002 IOPAMIDOL 61 % SOLUTION: Performed by: EMERGENCY MEDICINE

## 2019-05-09 PROCEDURE — 96374 THER/PROPH/DIAG INJ IV PUSH: CPT

## 2019-05-09 PROCEDURE — 71275 CT ANGIOGRAPHY CHEST: CPT

## 2019-05-09 PROCEDURE — 99284 EMERGENCY DEPT VISIT MOD MDM: CPT

## 2019-05-09 PROCEDURE — 85610 PROTHROMBIN TIME: CPT | Performed by: EMERGENCY MEDICINE

## 2019-05-09 PROCEDURE — 25010000002 HYDROMORPHONE PER 4 MG: Performed by: EMERGENCY MEDICINE

## 2019-05-09 PROCEDURE — 25010000002 HYDROMORPHONE 1 MG/ML SOLUTION: Performed by: EMERGENCY MEDICINE

## 2019-05-09 PROCEDURE — 80053 COMPREHEN METABOLIC PANEL: CPT | Performed by: EMERGENCY MEDICINE

## 2019-05-09 RX ORDER — HYDROMORPHONE HYDROCHLORIDE 1 MG/ML
0.5 INJECTION, SOLUTION INTRAMUSCULAR; INTRAVENOUS; SUBCUTANEOUS ONCE
Status: COMPLETED | OUTPATIENT
Start: 2019-05-09 | End: 2019-05-09

## 2019-05-09 RX ORDER — SODIUM CHLORIDE 9 MG/ML
125 INJECTION, SOLUTION INTRAVENOUS CONTINUOUS
Status: DISCONTINUED | OUTPATIENT
Start: 2019-05-09 | End: 2019-05-09 | Stop reason: HOSPADM

## 2019-05-09 RX ORDER — SODIUM CHLORIDE 0.9 % (FLUSH) 0.9 %
10 SYRINGE (ML) INJECTION AS NEEDED
Status: DISCONTINUED | OUTPATIENT
Start: 2019-05-09 | End: 2019-05-09 | Stop reason: HOSPADM

## 2019-05-09 RX ADMIN — SODIUM CHLORIDE 125 ML/HR: 9 INJECTION, SOLUTION INTRAVENOUS at 08:02

## 2019-05-09 RX ADMIN — HYDROMORPHONE HYDROCHLORIDE 0.5 MG: 1 INJECTION, SOLUTION INTRAMUSCULAR; INTRAVENOUS; SUBCUTANEOUS at 08:00

## 2019-05-09 RX ADMIN — HYDROMORPHONE HYDROCHLORIDE 1 MG: 1 INJECTION, SOLUTION INTRAMUSCULAR; INTRAVENOUS; SUBCUTANEOUS at 09:50

## 2019-05-09 RX ADMIN — IOPAMIDOL 95 ML: 612 INJECTION, SOLUTION INTRAVENOUS at 10:14

## 2019-05-09 RX ADMIN — HYDROMORPHONE HYDROCHLORIDE 0.5 MG: 1 INJECTION, SOLUTION INTRAMUSCULAR; INTRAVENOUS; SUBCUTANEOUS at 11:52

## 2019-05-09 NOTE — ED TRIAGE NOTES
"Patient c/o right side and \"lung\" pain onset last night, vomiting onset today. Patient has hx of lung cancer and had chemo yesterday. Patient states \"this happens every time after chemo\"  "

## 2019-05-09 NOTE — ED PROVIDER NOTES
" EMERGENCY DEPARTMENT ENCOUNTER    CHIEF COMPLAINT  Chief Complaint: chest wall pain  History given by: patient  History limited by: none  Room Number: 13/13  PMD: Jase Warner      HPI:  Pt is a 44 y.o. female who presents with stage 4 colorectal CA that have  metastasized to the lungs, liver, and spine. complaining of right lateral chest wall pain that began 2 months ago. Pt states over the past 2 months she has been on IV chemotherapy transfusions. Pt states that she began her transfusion 2 days ago and that the pain worsened yesterday. She states that this pattern is typical. Pt also complains of \"a large mass\" to her right chest. Pt states the pain is worse with deep breathing and movement. Pt also complains of nausea and and 2 episodes of vomiting within the past 24 hours. Pt is followed by  at [facility]. Pt denies cough, fever, abd pain, rash, dysuria, hematoma, bloody or black stool. Pt states she has intermittent constipation and will take milk and magnesia at home. She states her last BM was 3 days ago. Family at bedside states pt has bilateral pneumothorax last summer with bilateral pleurodesis that was performed by [facility]. Pt states she recently had pelvic surgery including B salpingoopherectomy.     Duration:  2 months  Onset: gradual  Timing: intermittent  Location: right lateral chest  Radiation: none  Quality: pain  Intensity/Severity: moderate  Progression: unchangd  Associated Symptoms: \"large mass\" to right chest, nausea, vomiting x2  Aggravating Factors: deep breathing and movement  Previous Episodes: similar pattern with 48 hour IV transfusion for chemotherapy  Treatment before arrival: none    PAST MEDICAL HISTORY  Active Ambulatory Problems     Diagnosis Date Noted   • Pelvic pain 03/29/2019   • Abscess of ovary 06/10/2011   • Adenocarcinoma of rectum (CMS/HCC) 05/20/2011   • Encounter for colonoscopy due to history of colon cancer 03/18/2014   • Cancer (CMS/HCC) " 12/01/2016   • Encounter for screening for malignant neoplasm of colon 03/18/2014   • History of malignant neoplasm of rectum 03/27/2012   • Malignant neoplasm of sigmoid colon (CMS/HCC) 12/03/2015   • Secondary adenocarcinoma of liver (CMS/HCC) 04/03/2018   • 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   • Secondary malignant neoplasm of right lung (CMS/HCC) 04/03/2018   • 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 No pertinent family history.    SOCIAL HISTORY  Social History     Socioeconomic History   • Marital status:      Spouse name: Not on file   • Number of children: Not on file   • Years of education: Not on file   • Highest education level: Not on file   Tobacco Use   • Smoking status: Never Smoker   • Smokeless tobacco: Never Used   Substance and Sexual Activity   • Alcohol use: No   • Drug use: No   • Sexual activity: Defer       ALLERGIES  Patient has no known allergies.    REVIEW OF SYSTEMS  Review of Systems   Constitutional: Negative for chills and fever.   HENT: Negative for rhinorrhea and sore throat.    Eyes: Negative for visual disturbance.   Respiratory: Negative for cough and shortness of breath.    Cardiovascular: Negative for chest pain, palpitations and leg swelling.   Gastrointestinal: Positive for nausea and vomiting (x2). Negative  "for abdominal pain and diarrhea.   Endocrine: Negative.    Genitourinary: Negative for decreased urine volume, dysuria and frequency.   Musculoskeletal: Negative for neck pain.        (+) right lateral chest wall pain  (+) \"large mass to right chest\"   Skin: Negative for rash.   Neurological: Negative for syncope and headaches.   Psychiatric/Behavioral: Negative.    All other systems reviewed and are negative.      PHYSICAL EXAM  ED Triage Vitals   Temp Heart Rate Resp BP SpO2   05/09/19 0736 05/09/19 0736 05/09/19 0736 05/09/19 0739 05/09/19 0736   98.9 °F (37.2 °C) 92 18 138/88 100 %      Temp src Heart Rate Source Patient Position BP Location FiO2 (%)   05/09/19 0736 -- -- -- --   Tympanic           Physical Exam   Constitutional: She is oriented to person, place, and time.  Non-toxic appearance. She appears distressed (moderate).   Thin and wasted   HENT:   Head: Normocephalic and atraumatic.   Eyes: EOM are normal.   Neck: Normal range of motion. Neck supple.   Cardiovascular: Normal rate, regular rhythm, normal heart sounds and intact distal pulses.   No murmur heard.  Pulses:       Posterior tibial pulses are 2+ on the right side, and 2+ on the left side.   Peripheral pulses intact bilaterally   Pulmonary/Chest: Effort normal. No respiratory distress.   Mild decrease breath sounds bilaterally  Port to the right upper chest wall that is clean, dry, and intact   Abdominal: Soft. Bowel sounds are normal. There is no tenderness. There is no rebound and no guarding.   Musculoskeletal: Normal range of motion. She exhibits no edema.   Neurological: She is alert and oriented to person, place, and time.   Skin: Skin is warm and dry.   Psychiatric: Affect normal.   Nursing note and vitals reviewed.      LAB RESULTS  Lab Results (last 24 hours)     Procedure Component Value Units Date/Time    CBC & Differential [[id number]] Collected:  05/09/19 0758    Specimen:  Blood Updated:  05/09/19 0823    Narrative:       The " following orders were created for panel order CBC & Differential.  Procedure                               Abnormality         Status                     ---------                               -----------         ------                     CBC Auto Differential[[id number]]        Abnormal            Final result                 Please view results for these tests on the individual orders.    Comprehensive Metabolic Panel [[id number]]  (Abnormal) Collected:  05/09/19 0758    Specimen:  Blood Updated:  05/09/19 0828     Glucose 134 mg/dL      BUN 13 mg/dL      Creatinine 0.65 mg/dL      Sodium 138 mmol/L      Potassium 4.2 mmol/L      Chloride 103 mmol/L      CO2 24.1 mmol/L      Calcium 8.5 mg/dL      Total Protein 7.8 g/dL      Albumin 4.90 g/dL      ALT (SGPT) 8 U/L      AST (SGOT) 17 U/L      Alkaline Phosphatase 89 U/L      Total Bilirubin 0.4 mg/dL      eGFR Non African Amer 100 mL/min/1.73      Globulin 2.9 gm/dL      A/G Ratio 1.7 g/dL      BUN/Creatinine Ratio 20.0     Anion Gap 10.9 mmol/L     Narrative:       GFR Normal >60  Chronic Kidney Disease <60  Kidney Failure <15    Lipase [[id number]]  (Normal) Collected:  05/09/19 0758    Specimen:  Blood Updated:  05/09/19 0828     Lipase 36 U/L     Protime-INR [[id number]]  (Normal) Collected:  05/09/19 0758    Specimen:  Blood Updated:  05/09/19 0822     Protime 13.1 Seconds      INR 1.02    aPTT [[id number]]  (Normal) Collected:  05/09/19 0758    Specimen:  Blood Updated:  05/09/19 0822     PTT 26.7 seconds     CBC Auto Differential [[id number]]  (Abnormal) Collected:  05/09/19 0758    Specimen:  Blood Updated:  05/09/19 0823     WBC 11.19 10*3/mm3      RBC 3.92 10*6/mm3      Hemoglobin 9.7 g/dL      Hematocrit 33.1 %      MCV 84.4 fL      MCH 24.7 pg      MCHC 29.3 g/dL      RDW 18.6 %      RDW-SD 57.1 fl      MPV 9.2 fL      Platelets 303 10*3/mm3      Neutrophil % 91.1 %      Lymphocyte % 5.1 %      Monocyte % 2.9 %      Eosinophil % 0.0 %      Basophil % 0.2 %       Immature Grans % 0.7 %      Neutrophils, Absolute 10.19 10*3/mm3      Lymphocytes, Absolute 0.57 10*3/mm3      Monocytes, Absolute 0.33 10*3/mm3      Eosinophils, Absolute 0.00 10*3/mm3      Basophils, Absolute 0.02 10*3/mm3      Immature Grans, Absolute 0.08 10*3/mm3      nRBC 0.0 /100 WBC           I ordered the above labs and reviewed the results    RADIOLOGY  CT Angiogram Chest With Contrast   Preliminary Result   1. No convincing evidence of pulmonary artery thromboembolism.   2. Bilateral irregular consolidative and nodular densities consistent   with pulmonary metastatic disease. There is no prior CT chest available   for comparison. A few of these lesions particularly within the posterior   right upper lobe demonstrate areas of surrounding ground glass change   that may represent tumor infiltration/posttreatment change and/or   atypical infection.       These findings were discussed with Mattie Helton by telephone.       Radiation dose reduction techniques were utilized, including automated   exposure control and exposure modulation based on body size.                      XR Chest 1 View   The lungs are well-expanded with very extensive combination of  scarring and changes of metastatic lung disease and the appearance is  similar to the study of 03/28/2019. The heart size remains normal. A  right-sided MediPort catheter ends in the right atrium without change.           I ordered the above noted radiological studies. Interpreted by radiologist. Discussed with radiologist (). Reviewed by me in PACS.       PROCEDURES  Procedures      PROGRESS AND CONSULTS     0757-Ordered IVF and dilaudid for pain. Ordered CTA chest, CXR, and lab work for further evaluation.     0946-Ordered dilaudid for pain.     1042-Per  (radiology), CTA chest is negative acute for PE.     1104-Pt is sleeping comfortable in no apparent pain. Placed call to  (oncology) for consult.    1113-Discussed pt case with   (oncology) who agrees that abx are not indicated at this time. He states that he will have pt f/u closely as outpatient.     1140-Rechecked pt. Pt is resting comfortably. Notified pt of CTA chest-negative for PE and slightly elevate WBC. Informed pt that I consulted with  (oncology) who agrees with the plan of care. Discussed the plan to discharge the pt home to follow closely with her oncologist.  I instructed the pt to hydrate well and f/u with her oncologist for further care. Pt understands and agrees with the plan, all questions answered.     1145-Ordered dilaudid prior to discharge.     MEDICAL DECISION MAKING  Results were reviewed/discussed with the patient and they were also made aware of online access. Pt also made aware that some labs, such as cultures, will not be resulted during ER visit and follow up with PMD is necessary.     MDM  Number of Diagnoses or Management Options  Chest pain, atypical:      Amount and/or Complexity of Data Reviewed  Clinical lab tests: reviewed and ordered (WBC-11.19  INR-1.02  hemoglobin-9.7  lipase-36)  Tests in the radiology section of CPT®: reviewed and ordered (CTA chest-negative PE)  Discussion of test results with the performing providers: yes ()  Decide to obtain previous medical records or to obtain history from someone other than the patient: yes  Obtain history from someone other than the patient: yes (family)  Discuss the patient with other providers: yes (  (oncology))  Independent visualization of images, tracings, or specimens: yes           DIAGNOSIS  Final diagnoses:   Chest pain, atypical       DISPOSITION  DISCHARGE    Patient discharged in stable condition.    Reviewed implications of results, diagnosis, meds, responsibility to follow up, warning signs and symptoms of possible worsening, potential complications and reasons to return to ER.    Patient/Family voiced understanding of above instructions.    Discussed plan for  discharge, as there is no emergent indication for admission. Patient referred to primary care provider for BP management due to today's BP. Pt/family is agreeable and understands need for follow up and repeat testing.  Pt is aware that discharge does not mean that nothing is wrong but it indicates no emergency is present that requires admission and they must continue care with follow-up as given below or physician of their choice.     FOLLOW-UP  Jase Warner  [address] [facility] [address]  [phone]    Schedule an appointment as soon as possible for a visit in 3 days  EVEN IF WELL         Medication List      No changes were made to your prescriptions during this visit.           Latest Documented Vital Signs:  As of 11:40 AM  BP- 127/86 HR- 61 Temp- 98.9 °F (37.2 °C) (Tympanic) O2 sat- 98%    --  Documentation assistance provided by luis e Metz for MD Ankia.  Information recorded by the scribe was done at my direction and has been verified and validated by me.                   Rochelle Metz  05/09/19 1146       Mattie Helton MD  05/17/19 [id number]

## 2019-06-11 ENCOUNTER — APPOINTMENT (OUTPATIENT)
Dept: ULTRASOUND IMAGING | Facility: HOSPITAL | Age: 44
End: 2019-06-11

## 2019-06-11 ENCOUNTER — HOSPITAL ENCOUNTER (OUTPATIENT)
Facility: HOSPITAL | Age: 44
Setting detail: OBSERVATION
Discharge: HOME | End: 2019-06-13
Attending: EMERGENCY MEDICINE | Admitting: INTERNAL MEDICINE

## 2019-06-11 DIAGNOSIS — R10.9 INTRACTABLE ABDOMINAL PAIN: Primary | ICD-10-CM

## 2019-06-11 DIAGNOSIS — C79.9 METASTATIC CANCER (HCC): ICD-10-CM

## 2019-06-11 LAB
ALBUMIN SERPL-MCNC: 4.1 G/DL (ref 3.5–5.2)
ALBUMIN/GLOB SERPL: 1.4 G/DL
ALP SERPL-CCNC: 88 U/L (ref 39–117)
ALT SERPL W P-5'-P-CCNC: 6 U/L (ref 1–33)
ANION GAP SERPL CALCULATED.3IONS-SCNC: 11.6 MMOL/L
AST SERPL-CCNC: 16 U/L (ref 1–32)
BASOPHILS # BLD AUTO: 0.01 10*3/MM3 (ref 0–0.2)
BASOPHILS NFR BLD AUTO: 0.1 % (ref 0–1.5)
BILIRUB SERPL-MCNC: 0.2 MG/DL (ref 0.2–1.2)
BUN BLD-MCNC: 7 MG/DL (ref 6–20)
BUN/CREAT SERPL: 9.1 (ref 7–25)
CALCIUM SPEC-SCNC: 8.4 MG/DL (ref 8.6–10.5)
CHLORIDE SERPL-SCNC: 106 MMOL/L (ref 98–107)
CO2 SERPL-SCNC: 23.4 MMOL/L (ref 22–29)
CREAT BLD-MCNC: 0.77 MG/DL (ref 0.57–1)
DEPRECATED RDW RBC AUTO: 84.3 FL (ref 37–54)
EOSINOPHIL # BLD AUTO: 0.15 10*3/MM3 (ref 0–0.4)
EOSINOPHIL NFR BLD AUTO: 1.9 % (ref 0.3–6.2)
ERYTHROCYTE [DISTWIDTH] IN BLOOD BY AUTOMATED COUNT: 26.2 % (ref 12.3–15.4)
GFR SERPL CREATININE-BSD FRML MDRD: 82 ML/MIN/1.73
GLOBULIN UR ELPH-MCNC: 3 GM/DL
GLUCOSE BLD-MCNC: 110 MG/DL (ref 65–99)
HCT VFR BLD AUTO: 35.9 % (ref 34–46.6)
HGB BLD-MCNC: 10.8 G/DL (ref 12–15.9)
IMM GRANULOCYTES # BLD AUTO: 0.04 10*3/MM3 (ref 0–0.05)
IMM GRANULOCYTES NFR BLD AUTO: 0.5 % (ref 0–0.5)
LIPASE SERPL-CCNC: 35 U/L (ref 13–60)
LYMPHOCYTES # BLD AUTO: 1.31 10*3/MM3 (ref 0.7–3.1)
LYMPHOCYTES NFR BLD AUTO: 16.3 % (ref 19.6–45.3)
MCH RBC QN AUTO: 27.1 PG (ref 26.6–33)
MCHC RBC AUTO-ENTMCNC: 30.1 G/DL (ref 31.5–35.7)
MCV RBC AUTO: 90 FL (ref 79–97)
MONOCYTES # BLD AUTO: 0.78 10*3/MM3 (ref 0.1–0.9)
MONOCYTES NFR BLD AUTO: 9.7 % (ref 5–12)
NEUTROPHILS # BLD AUTO: 5.77 10*3/MM3 (ref 1.7–7)
NEUTROPHILS NFR BLD AUTO: 71.5 % (ref 42.7–76)
NRBC BLD AUTO-RTO: 0 /100 WBC (ref 0–0.2)
PLATELET # BLD AUTO: 269 10*3/MM3 (ref 140–450)
PMV BLD AUTO: 9.7 FL (ref 6–12)
POTASSIUM BLD-SCNC: 4 MMOL/L (ref 3.5–5.2)
PROT SERPL-MCNC: 7.1 G/DL (ref 6–8.5)
RBC # BLD AUTO: 3.99 10*6/MM3 (ref 3.77–5.28)
SODIUM BLD-SCNC: 141 MMOL/L (ref 136–145)
WBC NRBC COR # BLD: 8.06 10*3/MM3 (ref 3.4–10.8)

## 2019-06-11 PROCEDURE — 93976 VASCULAR STUDY: CPT

## 2019-06-11 PROCEDURE — 81025 URINE PREGNANCY TEST: CPT | Performed by: INTERNAL MEDICINE

## 2019-06-11 PROCEDURE — 25010000002 HYDROMORPHONE PER 4 MG: Performed by: EMERGENCY MEDICINE

## 2019-06-11 PROCEDURE — 99284 EMERGENCY DEPT VISIT MOD MDM: CPT

## 2019-06-11 PROCEDURE — G0378 HOSPITAL OBSERVATION PER HR: HCPCS

## 2019-06-11 PROCEDURE — 25010000002 ONDANSETRON PER 1 MG: Performed by: EMERGENCY MEDICINE

## 2019-06-11 PROCEDURE — 96374 THER/PROPH/DIAG INJ IV PUSH: CPT

## 2019-06-11 PROCEDURE — 25010000002 HYDROMORPHONE 1 MG/ML SOLUTION: Performed by: EMERGENCY MEDICINE

## 2019-06-11 PROCEDURE — 80053 COMPREHEN METABOLIC PANEL: CPT | Performed by: EMERGENCY MEDICINE

## 2019-06-11 PROCEDURE — 81001 URINALYSIS AUTO W/SCOPE: CPT | Performed by: EMERGENCY MEDICINE

## 2019-06-11 PROCEDURE — 76830 TRANSVAGINAL US NON-OB: CPT

## 2019-06-11 PROCEDURE — 85025 COMPLETE CBC W/AUTO DIFF WBC: CPT | Performed by: EMERGENCY MEDICINE

## 2019-06-11 PROCEDURE — 96375 TX/PRO/DX INJ NEW DRUG ADDON: CPT

## 2019-06-11 PROCEDURE — 83690 ASSAY OF LIPASE: CPT | Performed by: EMERGENCY MEDICINE

## 2019-06-11 PROCEDURE — 96376 TX/PRO/DX INJ SAME DRUG ADON: CPT

## 2019-06-11 RX ORDER — HYDROMORPHONE HYDROCHLORIDE 1 MG/ML
0.5 INJECTION, SOLUTION INTRAMUSCULAR; INTRAVENOUS; SUBCUTANEOUS ONCE
Status: COMPLETED | OUTPATIENT
Start: 2019-06-11 | End: 2019-06-11

## 2019-06-11 RX ORDER — ONDANSETRON 2 MG/ML
4 INJECTION INTRAMUSCULAR; INTRAVENOUS ONCE
Status: COMPLETED | OUTPATIENT
Start: 2019-06-11 | End: 2019-06-11

## 2019-06-11 RX ADMIN — HYDROMORPHONE HYDROCHLORIDE 1 MG: 1 INJECTION, SOLUTION INTRAMUSCULAR; INTRAVENOUS; SUBCUTANEOUS at 23:34

## 2019-06-11 RX ADMIN — ONDANSETRON HYDROCHLORIDE 4 MG: 2 SOLUTION INTRAMUSCULAR; INTRAVENOUS at 22:48

## 2019-06-11 RX ADMIN — HYDROMORPHONE HYDROCHLORIDE 0.5 MG: 1 INJECTION, SOLUTION INTRAMUSCULAR; INTRAVENOUS; SUBCUTANEOUS at 22:49

## 2019-06-11 RX ADMIN — HYDROMORPHONE HYDROCHLORIDE 1 MG: 1 INJECTION, SOLUTION INTRAMUSCULAR; INTRAVENOUS; SUBCUTANEOUS at 23:55

## 2019-06-11 RX ADMIN — SODIUM CHLORIDE 1000 ML: 9 INJECTION, SOLUTION INTRAVENOUS at 23:34

## 2019-06-11 RX ADMIN — HYDROMORPHONE HYDROCHLORIDE 0.5 MG: 1 INJECTION, SOLUTION INTRAMUSCULAR; INTRAVENOUS; SUBCUTANEOUS at 23:03

## 2019-06-12 ENCOUNTER — APPOINTMENT (OUTPATIENT)
Dept: ULTRASOUND IMAGING | Facility: HOSPITAL | Age: 44
End: 2019-06-12

## 2019-06-12 ENCOUNTER — APPOINTMENT (OUTPATIENT)
Dept: MRI IMAGING | Facility: HOSPITAL | Age: 44
End: 2019-06-12

## 2019-06-12 ENCOUNTER — APPOINTMENT (OUTPATIENT)
Dept: GENERAL RADIOLOGY | Facility: HOSPITAL | Age: 44
End: 2019-06-12

## 2019-06-12 ENCOUNTER — APPOINTMENT (OUTPATIENT)
Dept: CT IMAGING | Facility: HOSPITAL | Age: 44
End: 2019-06-12

## 2019-06-12 PROBLEM — M25.552 LEFT HIP PAIN: Status: ACTIVE | Noted: 2019-06-12

## 2019-06-12 PROBLEM — S32.039A L3 VERTEBRAL FRACTURE (HCC): Status: ACTIVE | Noted: 2019-06-12

## 2019-06-12 PROBLEM — R10.9 INTRACTABLE ABDOMINAL PAIN: Status: ACTIVE | Noted: 2019-06-12

## 2019-06-12 LAB
ANION GAP SERPL CALCULATED.3IONS-SCNC: 8.1 MMOL/L
B-HCG UR QL: NEGATIVE
BACTERIA UR QL AUTO: ABNORMAL /HPF
BILIRUB UR QL STRIP: NEGATIVE
BUN BLD-MCNC: 6 MG/DL (ref 6–20)
BUN/CREAT SERPL: 11.1 (ref 7–25)
CALCIUM SPEC-SCNC: 7.4 MG/DL (ref 8.6–10.5)
CHLORIDE SERPL-SCNC: 109 MMOL/L (ref 98–107)
CLARITY UR: CLEAR
CO2 SERPL-SCNC: 23.9 MMOL/L (ref 22–29)
COLOR UR: YELLOW
CREAT BLD-MCNC: 0.54 MG/DL (ref 0.57–1)
DEPRECATED RDW RBC AUTO: 85 FL (ref 37–54)
ERYTHROCYTE [DISTWIDTH] IN BLOOD BY AUTOMATED COUNT: 25.7 % (ref 12.3–15.4)
GFR SERPL CREATININE-BSD FRML MDRD: 124 ML/MIN/1.73
GLUCOSE BLD-MCNC: 97 MG/DL (ref 65–99)
GLUCOSE UR STRIP-MCNC: NEGATIVE MG/DL
HCT VFR BLD AUTO: 32.7 % (ref 34–46.6)
HGB BLD-MCNC: 9.5 G/DL (ref 12–15.9)
HGB UR QL STRIP.AUTO: NEGATIVE
HYALINE CASTS UR QL AUTO: ABNORMAL /LPF
KETONES UR QL STRIP: ABNORMAL
LEUKOCYTE ESTERASE UR QL STRIP.AUTO: ABNORMAL
MCH RBC QN AUTO: 26.8 PG (ref 26.6–33)
MCHC RBC AUTO-ENTMCNC: 29.1 G/DL (ref 31.5–35.7)
MCV RBC AUTO: 92.4 FL (ref 79–97)
NITRITE UR QL STRIP: NEGATIVE
PH UR STRIP.AUTO: <=5 [PH] (ref 5–8)
PLATELET # BLD AUTO: 232 10*3/MM3 (ref 140–450)
PMV BLD AUTO: 9.1 FL (ref 6–12)
POTASSIUM BLD-SCNC: 4.3 MMOL/L (ref 3.5–5.2)
PROT UR QL STRIP: NEGATIVE
RBC # BLD AUTO: 3.54 10*6/MM3 (ref 3.77–5.28)
RBC # UR: ABNORMAL /HPF
REF LAB TEST METHOD: ABNORMAL
SODIUM BLD-SCNC: 141 MMOL/L (ref 136–145)
SP GR UR STRIP: >=1.03 (ref 1–1.03)
SQUAMOUS #/AREA URNS HPF: ABNORMAL /HPF
UROBILINOGEN UR QL STRIP: ABNORMAL
WBC NRBC COR # BLD: 6.95 10*3/MM3 (ref 3.4–10.8)
WBC UR QL AUTO: ABNORMAL /HPF

## 2019-06-12 PROCEDURE — 0 GADOBENATE DIMEGLUMINE 529 MG/ML SOLUTION: Performed by: INTERNAL MEDICINE

## 2019-06-12 PROCEDURE — 85027 COMPLETE CBC AUTOMATED: CPT | Performed by: NURSE PRACTITIONER

## 2019-06-12 PROCEDURE — 25010000002 IOPAMIDOL 61 % SOLUTION: Performed by: INTERNAL MEDICINE

## 2019-06-12 PROCEDURE — G0378 HOSPITAL OBSERVATION PER HR: HCPCS

## 2019-06-12 PROCEDURE — 25010000002 LORAZEPAM PER 2 MG: Performed by: EMERGENCY MEDICINE

## 2019-06-12 PROCEDURE — 25010000002 ONDANSETRON PER 1 MG: Performed by: NURSE PRACTITIONER

## 2019-06-12 PROCEDURE — 72158 MRI LUMBAR SPINE W/O & W/DYE: CPT

## 2019-06-12 PROCEDURE — 0 DIATRIZOATE MEGLUMINE & SODIUM PER 1 ML: Performed by: INTERNAL MEDICINE

## 2019-06-12 PROCEDURE — 99204 OFFICE O/P NEW MOD 45 MIN: CPT | Performed by: INTERNAL MEDICINE

## 2019-06-12 PROCEDURE — 96376 TX/PRO/DX INJ SAME DRUG ADON: CPT

## 2019-06-12 PROCEDURE — A9577 INJ MULTIHANCE: HCPCS | Performed by: INTERNAL MEDICINE

## 2019-06-12 PROCEDURE — 25010000002 HYDROMORPHONE PER 4 MG: Performed by: HOSPITALIST

## 2019-06-12 PROCEDURE — 80048 BASIC METABOLIC PNL TOTAL CA: CPT | Performed by: NURSE PRACTITIONER

## 2019-06-12 PROCEDURE — 74177 CT ABD & PELVIS W/CONTRAST: CPT

## 2019-06-12 PROCEDURE — 25010000002 LORAZEPAM PER 2 MG: Performed by: INTERNAL MEDICINE

## 2019-06-12 PROCEDURE — 96375 TX/PRO/DX INJ NEW DRUG ADDON: CPT

## 2019-06-12 PROCEDURE — 73502 X-RAY EXAM HIP UNI 2-3 VIEWS: CPT

## 2019-06-12 RX ORDER — ACETAMINOPHEN AND CODEINE PHOSPHATE 120; 12 MG/5ML; MG/5ML
1 SOLUTION ORAL EVERY EVENING
COMMUNITY
End: 2019-08-28 | Stop reason: HOSPADM

## 2019-06-12 RX ORDER — SODIUM CHLORIDE 0.9 % (FLUSH) 0.9 %
1-10 SYRINGE (ML) INJECTION AS NEEDED
Status: DISCONTINUED | OUTPATIENT
Start: 2019-06-12 | End: 2019-06-13 | Stop reason: HOSPADM

## 2019-06-12 RX ORDER — DEXAMETHASONE 2 MG/1
2 TABLET ORAL 2 TIMES DAILY WITH MEALS
Status: DISCONTINUED | OUTPATIENT
Start: 2019-06-12 | End: 2019-06-12

## 2019-06-12 RX ORDER — ONDANSETRON 2 MG/ML
4 INJECTION INTRAMUSCULAR; INTRAVENOUS EVERY 6 HOURS PRN
Status: DISCONTINUED | OUTPATIENT
Start: 2019-06-12 | End: 2019-06-13 | Stop reason: HOSPADM

## 2019-06-12 RX ORDER — ONDANSETRON 4 MG/1
4 TABLET, FILM COATED ORAL EVERY 6 HOURS PRN
Status: DISCONTINUED | OUTPATIENT
Start: 2019-06-12 | End: 2019-06-13 | Stop reason: HOSPADM

## 2019-06-12 RX ORDER — SENNA AND DOCUSATE SODIUM 50; 8.6 MG/1; MG/1
2 TABLET, FILM COATED ORAL 2 TIMES DAILY PRN
Status: DISCONTINUED | OUTPATIENT
Start: 2019-06-12 | End: 2019-06-12

## 2019-06-12 RX ORDER — LORAZEPAM 0.5 MG/1
0.5 TABLET ORAL EVERY 6 HOURS PRN
Status: DISCONTINUED | OUTPATIENT
Start: 2019-06-12 | End: 2019-06-13 | Stop reason: HOSPADM

## 2019-06-12 RX ORDER — CETIRIZINE HYDROCHLORIDE 10 MG/1
10 TABLET ORAL NIGHTLY
Status: DISCONTINUED | OUTPATIENT
Start: 2019-06-12 | End: 2019-06-13 | Stop reason: HOSPADM

## 2019-06-12 RX ORDER — CETIRIZINE HYDROCHLORIDE 10 MG/1
10 TABLET ORAL DAILY
Status: DISCONTINUED | OUTPATIENT
Start: 2019-06-12 | End: 2019-06-12

## 2019-06-12 RX ORDER — ACETAMINOPHEN 325 MG/1
650 TABLET ORAL EVERY 4 HOURS PRN
Status: DISCONTINUED | OUTPATIENT
Start: 2019-06-12 | End: 2019-06-13 | Stop reason: HOSPADM

## 2019-06-12 RX ORDER — HYDROMORPHONE HYDROCHLORIDE 1 MG/ML
1 INJECTION, SOLUTION INTRAMUSCULAR; INTRAVENOUS; SUBCUTANEOUS
Status: DISCONTINUED | OUTPATIENT
Start: 2019-06-12 | End: 2019-06-13 | Stop reason: HOSPADM

## 2019-06-12 RX ORDER — SODIUM CHLORIDE 0.9 % (FLUSH) 0.9 %
3 SYRINGE (ML) INJECTION EVERY 12 HOURS SCHEDULED
Status: DISCONTINUED | OUTPATIENT
Start: 2019-06-12 | End: 2019-06-13 | Stop reason: HOSPADM

## 2019-06-12 RX ORDER — MIRTAZAPINE 15 MG/1
15 TABLET, FILM COATED ORAL NIGHTLY
COMMUNITY
End: 2019-08-28 | Stop reason: HOSPADM

## 2019-06-12 RX ORDER — ASCORBIC ACID 500 MG
1000 TABLET ORAL DAILY
Status: DISCONTINUED | OUTPATIENT
Start: 2019-06-12 | End: 2019-06-13 | Stop reason: HOSPADM

## 2019-06-12 RX ORDER — LORAZEPAM 2 MG/ML
1 INJECTION INTRAMUSCULAR
Status: COMPLETED | OUTPATIENT
Start: 2019-06-12 | End: 2019-06-12

## 2019-06-12 RX ORDER — LORAZEPAM 2 MG/ML
1 INJECTION INTRAMUSCULAR ONCE
Status: COMPLETED | OUTPATIENT
Start: 2019-06-12 | End: 2019-06-12

## 2019-06-12 RX ORDER — MIRTAZAPINE 15 MG/1
15 TABLET, FILM COATED ORAL NIGHTLY
Status: DISCONTINUED | OUTPATIENT
Start: 2019-06-12 | End: 2019-06-13 | Stop reason: HOSPADM

## 2019-06-12 RX ORDER — SENNA AND DOCUSATE SODIUM 50; 8.6 MG/1; MG/1
2 TABLET, FILM COATED ORAL NIGHTLY
Status: DISCONTINUED | OUTPATIENT
Start: 2019-06-12 | End: 2019-06-13 | Stop reason: HOSPADM

## 2019-06-12 RX ORDER — LIDOCAINE 50 MG/G
1 PATCH TOPICAL EVERY 24 HOURS
Status: DISCONTINUED | OUTPATIENT
Start: 2019-06-12 | End: 2019-06-13 | Stop reason: HOSPADM

## 2019-06-12 RX ORDER — SENNA AND DOCUSATE SODIUM 50; 8.6 MG/1; MG/1
1 TABLET, FILM COATED ORAL EVERY EVENING
COMMUNITY
End: 2019-08-28 | Stop reason: HOSPADM

## 2019-06-12 RX ORDER — HYDROMORPHONE HYDROCHLORIDE 2 MG/1
1 TABLET ORAL EVERY 4 HOURS PRN
Status: DISCONTINUED | OUTPATIENT
Start: 2019-06-12 | End: 2019-06-13 | Stop reason: HOSPADM

## 2019-06-12 RX ORDER — L.ACID,PARA/B.BIFIDUM/S.THERM 8B CELL
1 CAPSULE ORAL DAILY
Status: DISCONTINUED | OUTPATIENT
Start: 2019-06-12 | End: 2019-06-13 | Stop reason: HOSPADM

## 2019-06-12 RX ADMIN — LIDOCAINE 1 PATCH: 50 PATCH TOPICAL at 16:35

## 2019-06-12 RX ADMIN — HYDROMORPHONE HYDROCHLORIDE 1 MG: 1 INJECTION, SOLUTION INTRAMUSCULAR; INTRAVENOUS; SUBCUTANEOUS at 09:07

## 2019-06-12 RX ADMIN — HYDROMORPHONE HYDROCHLORIDE 1 MG: 1 INJECTION, SOLUTION INTRAMUSCULAR; INTRAVENOUS; SUBCUTANEOUS at 11:03

## 2019-06-12 RX ADMIN — CETIRIZINE HYDROCHLORIDE 10 MG: 10 TABLET, FILM COATED ORAL at 20:19

## 2019-06-12 RX ADMIN — HYDROMORPHONE HYDROCHLORIDE 1 MG: 2 TABLET ORAL at 18:03

## 2019-06-12 RX ADMIN — GADOBENATE DIMEGLUMINE 9 ML: 529 INJECTION, SOLUTION INTRAVENOUS at 15:22

## 2019-06-12 RX ADMIN — SODIUM CHLORIDE, PRESERVATIVE FREE 3 ML: 5 INJECTION INTRAVENOUS at 08:02

## 2019-06-12 RX ADMIN — ONDANSETRON 4 MG: 2 INJECTION INTRAMUSCULAR; INTRAVENOUS at 03:52

## 2019-06-12 RX ADMIN — MIRTAZAPINE 15 MG: 15 TABLET, FILM COATED ORAL at 20:19

## 2019-06-12 RX ADMIN — OXYCODONE HYDROCHLORIDE AND ACETAMINOPHEN 1000 MG: 500 TABLET ORAL at 16:34

## 2019-06-12 RX ADMIN — SENNOSIDES,DOCUSATE SODIUM 2 TABLET: 50; 8.6 TABLET, FILM COATED ORAL at 20:19

## 2019-06-12 RX ADMIN — LORAZEPAM 1 MG: 2 INJECTION INTRAMUSCULAR; INTRAVENOUS at 01:54

## 2019-06-12 RX ADMIN — HYDROMORPHONE HYDROCHLORIDE 1 MG: 1 INJECTION, SOLUTION INTRAMUSCULAR; INTRAVENOUS; SUBCUTANEOUS at 13:44

## 2019-06-12 RX ADMIN — HYDROMORPHONE HYDROCHLORIDE 1 MG: 1 INJECTION, SOLUTION INTRAMUSCULAR; INTRAVENOUS; SUBCUTANEOUS at 19:13

## 2019-06-12 RX ADMIN — SODIUM CHLORIDE, PRESERVATIVE FREE 3 ML: 5 INJECTION INTRAVENOUS at 20:19

## 2019-06-12 RX ADMIN — SODIUM CHLORIDE, PRESERVATIVE FREE 3 ML: 5 INJECTION INTRAVENOUS at 03:28

## 2019-06-12 RX ADMIN — HYDROMORPHONE HYDROCHLORIDE 1 MG: 1 INJECTION, SOLUTION INTRAMUSCULAR; INTRAVENOUS; SUBCUTANEOUS at 03:53

## 2019-06-12 RX ADMIN — HYDROMORPHONE HYDROCHLORIDE 1 MG: 2 TABLET ORAL at 12:35

## 2019-06-12 RX ADMIN — HYDROMORPHONE HYDROCHLORIDE 1 MG: 1 INJECTION, SOLUTION INTRAMUSCULAR; INTRAVENOUS; SUBCUTANEOUS at 06:43

## 2019-06-12 RX ADMIN — DIATRIZOATE MEGLUMINE AND DIATRIZOATE SODIUM 30 ML: 600; 100 SOLUTION ORAL; RECTAL at 20:38

## 2019-06-12 RX ADMIN — LORAZEPAM 1 MG: 2 INJECTION INTRAMUSCULAR; INTRAVENOUS at 14:50

## 2019-06-12 RX ADMIN — IOPAMIDOL 85 ML: 612 INJECTION, SOLUTION INTRAVENOUS at 23:00

## 2019-06-12 RX ADMIN — Medication 1 CAPSULE: at 16:34

## 2019-06-12 NOTE — ED NOTES
Pt resting calmly in no apparent distress.  at bedside with pt.     Clarke Brush RN  06/12/19 0057

## 2019-06-12 NOTE — ED NOTES
Pt c/o severe LLQ abd pain, describes as sharp in nature, that started approx 1700 tonight. Pt is moaning and crying out in pain.  Pt appears in distress.   Pt with hx of colon cancer with mets to liver and bone.  Pain was unrelieved with home meds.      Kimberlee Rousseau, RN  06/11/19 0081

## 2019-06-12 NOTE — H&P
History and Physical    Patient Name: Vita Ren  Age/Sex: 44 y.o. female  : 1975  MRN: [id number]    Date of Admission: 2019  Date of Encounter Visit: 19  Encounter Provider: SATNAM Rivera  Place of Service: [facility]  Patient Care Team:  Jase Warner as PCP - General (Internal Medicine)    Subjective:     Chief Complaint:   Chief Complaint   Patient presents with   • Flank Pain   • Nausea       History of Present Illness  Vita Ren is a 44 y.o. female with a history of stage IV colon cancer with mets to liver, lungs and bone.  She was diagnosed with cancer 2011 has been undergoing chemo and radiation treatments ever since and is followed by oncology at [facility].  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  She is planning to have a PET scan on Monday for further evaluation of metastatic lesions.  She did have recent right ovarian cyst with significant pain and ended up having her right ovary removed.  She is on chronic steroids and is currently on Decadron 2 mg twice daily.    She presented to the emergency room last night with left lower quadrant abdominal pain that started around 5 PM last night with radiation to her back and down the anterior portion of her left lower extremity.  Denies any recent fever, chills, difficulty urinating, urinary frequency, difficulty controlling bowels or unsteady gait.  She attempted to take her home hydromorphone 0.5 mg with no relief despite taking 2 to 3 pills.  She does report having a vitamin infusion yesterday with higher doses of vitamin C and B vitamins.  She does take daily calcium and was recently given Zomig due to osteoporosis from chemotherapy.  She continues to complain of pain worse in the left hip area.     In the ER she had a transvaginal ultrasound that showed normal left ovary mild endometrial  thickness and retroverted uterus, but was overall unremarkable.  Labs are unremarkable except for hemoglobin of 10.8 that dropped to 9.5 today and small amount of leukocytes in urine.    Review of Systems   Constitutional: Positive for fever. Negative for chills and fatigue.   HENT: Negative for congestion and trouble swallowing.    Eyes: Negative for visual disturbance.   Respiratory: Positive for shortness of breath. Negative for cough and wheezing.    Cardiovascular: Positive for palpitations. Negative for chest pain and leg swelling.   Gastrointestinal: Positive for constipation and nausea. Negative for abdominal pain, blood in stool, diarrhea and vomiting.   Genitourinary: Negative for difficulty urinating, dysuria, frequency and hematuria.   Musculoskeletal: Positive for arthralgias (left hip with radiating pain down anterior left leg) and back pain.   Neurological: Positive for weakness. Negative for dizziness and syncope.   Psychiatric/Behavioral: Negative for confusion. The patient is not nervous/anxious.    All other systems reviewed and are negative.      Past Medical and Surgical History:  Past Medical History:   Diagnosis Date   • Cancer (CMS/HCC)     METASTATIC STAGE 4 COLORECTAL TO LIVER AND LUNGS   • Pneumothorax     RIGHT LUNG       Past Surgical History:   Procedure Laterality Date   • CHEST TUBE INSERTION     • COLON RESECTION      COLORECTAL RESECTION       Home Medications:   Medications Prior to Admission   Medication Sig Dispense Refill Last Dose   • CBD (cannabidiol) oral oil Take  by mouth At Night As Needed.      • dexamethasone (DECADRON) 2 MG tablet Take 2 mg by mouth 2 (Two) Times a Day With Meals. Takes only during chemo   Past Week at Unknown time   • HYDROmorphone (DILAUDID) 2 MG tablet Take 0.5 tablets by mouth Every 6 (Six) Hours As Needed for Severe Pain  for up to 12 doses. 6 tablet 0 3/28/2019 at Unknown time   • lidocaine (LIDODERM) 5 % Place 1 patch on the skin Daily. Remove &  Discard patch within 12 hours or as directed by MD   3/28/2019 at Unknown time   • LORazepam (ATIVAN) 0.5 MG tablet Take 0.5 mg by mouth Daily.   3/28/19   • mirtazapine (REMERON) 15 MG tablet Take 15 mg by mouth Every Night.      • Multiple Vitamin (MULTIVITAMINS PO) Take  by mouth.      • norethindrone (MICRONOR) 0.35 MG tablet Take 1 tablet by mouth Daily.      • NUTRITIONAL SUPPLEMENTS PO Take  by mouth.   Past Week at Unknown time   • ondansetron (ZOFRAN) 4 MG tablet Take 1 tablet by mouth Every 6 (Six) Hours As Needed for Nausea or Vomiting. 12 tablet 0 3/28/2019 at Unknown time   • PROBIOTIC PRODUCT PO Take  by mouth Daily.      • sennosides-docusate sodium (SENOKOT-S) 8.6-50 MG tablet Take 1 tablet by mouth 2 (Two) Times a Day.      • vitamin C (ASCORBIC ACID) 250 MG tablet Take 1,000 mg by mouth Daily.          Inpatient Medications:  Scheduled Meds:  dexamethasone 2 mg Oral BID With Meals   lactobacillus acidophilus 1 capsule Oral Daily   lidocaine 1 patch Transdermal Q24H   mirtazapine 15 mg Oral Nightly   sodium chloride 3 mL Intravenous Q12H   vitamin C 1,000 mg Oral Daily     Continuous Infusions:   PRN Meds:.•  acetaminophen  •  HYDROmorphone  •  HYDROmorphone  •  LORazepam  •  ondansetron **OR** ondansetron  •  sennosides-docusate sodium  •  sodium chloride    Allergies:  No Known Allergies    Past Social History:  Social History     Socioeconomic History   • Marital status:      Spouse name: Not on file   • Number of children: Not on file   • Years of education: Not on file   • Highest education level: Not on file   Tobacco Use   • Smoking status: Never Smoker   • Smokeless tobacco: Never Used   Substance and Sexual Activity   • Alcohol use: No   • Drug use: No   • Sexual activity: Defer       Past Family History:  No family history on file.    Objective:   Temp:  [96.7 °F (35.9 °C)-98.9 °F (37.2 °C)] 97.7 °F (36.5 °C)  Heart Rate:  [] 87  Resp:  [14-20] 18  BP: (109-139)/(74-90)  109/74   SpO2:  [95 %-99 %] 97 %  on    Device (Oxygen Therapy): room air    Intake/Output Summary (Last 24 hours) at 6/12/2019 1312  Last data filed at 6/12/2019 0641  Gross per 24 hour   Intake 120 ml   Output 600 ml   Net -480 ml     Body mass index is 16.74 kg/m².      06/11/19  2303 06/12/19  0309   Weight: 49 kg (108 lb) 47 kg (103 lb 11.2 oz)     Weight change:     Physical Exam   Constitutional: She is oriented to person, place, and time. She appears cachectic. No distress.   HENT:   Head: Normocephalic and atraumatic.   Mouth/Throat: Oropharynx is clear and moist.   Eyes: Conjunctivae are normal. Pupils are equal, round, and reactive to light.   Neck: Neck supple.   Cardiovascular: Normal rate, regular rhythm and normal heart sounds.   No murmur heard.  Pulmonary/Chest: Effort normal. She has wheezes (left expiratory). She has no rales.   Abdominal: Soft. Bowel sounds are normal. There is tenderness (Left lower quad).   Musculoskeletal: She exhibits tenderness (Left anterior hip with radiation of pain down anterior leg). She exhibits no edema.   No spinal process tenderness, but did have bony protrusion of spinal processes.  No CVA tenderness.  Normal gait.   Neurological: She is alert and oriented to person, place, and time.   Skin: Skin is warm and dry. She is not diaphoretic.   Psychiatric:   Tearful at times   Nursing note and vitals reviewed.       Lab Review:     Results from last 7 days   Lab Units 06/12/19  0440 06/11/19  2250   SODIUM mmol/L 141 141   POTASSIUM mmol/L 4.3 4.0   CHLORIDE mmol/L 109* 106   CO2 mmol/L 23.9 23.4   BUN mg/dL 6 7   CREATININE mg/dL 0.54* 0.77   GLUCOSE mg/dL 97 110*   CALCIUM mg/dL 7.4* 8.4*   AST (SGOT) U/L  --  16   ALT (SGPT) U/L  --  6     Estimated Creatinine Clearance: 100.7 mL/min (A) (by C-G formula based on SCr of 0.54 mg/dL (L)).                            Invalid input(s):  PHOS      Results from last 7 days   Lab Units 06/12/19  0440 06/11/19  2250   WBC  10*3/mm3 6.95 8.06   HEMOGLOBIN g/dL 9.5* 10.8*   HEMATOCRIT % 32.7* 35.9   PLATELETS 10*3/mm3 232 269   MCV fL 92.4 90.0   MCH pg 26.8 27.1   MCHC g/dL 29.1* 30.1*   RDW % 25.7* 26.2*   RDW-SD fl 85.0* 84.3*   MPV fL 9.1 9.7   NEUTROPHIL % %  --  71.5   LYMPHOCYTE % %  --  16.3*   MONOCYTES % %  --  9.7   EOSINOPHIL % %  --  1.9   BASOPHIL % %  --  0.1   IMM GRAN % %  --  0.5   NEUTROS ABS 10*3/mm3  --  5.77   LYMPHS ABS 10*3/mm3  --  1.31   MONOS ABS 10*3/mm3  --  0.78   EOS ABS 10*3/mm3  --  0.15   BASOS ABS 10*3/mm3  --  0.01   IMMATURE GRANS (ABS) 10*3/mm3  --  0.04   NRBC /100 WBC  --  0.0                   Results from last 7 days   Lab Units 06/11/19  2250   LIPASE U/L 35         Results from last 7 days   Lab Units 06/11/19  2356   NITRITE UA  Negative   WBC UA /HPF 6-12*   BACTERIA UA /HPF None Seen   SQUAM EPITHEL UA /HPF 3-6*               Imaging:  Imaging Results (last 24 hours)     Procedure Component Value Units Date/Time    US Non-ob Transvaginal [[id number]] Collected:  06/12/19 0037     Updated:  06/12/19 0041    Narrative:       PELVIC ULTRASOUND     CLINICAL HISTORY: pain. Prior right oophorectomy     COMPARISON: none.     FINDINGS: Longitudinal and transverse images of the pelvis were obtained  in both transabdominal and transvaginal fashions. Uterus measures 6.8 x  5.8 x 4.3 cm and has a retroverted configuration. Endometrial thickness  is 9 mm. Trace endometrial fluid. No discrete mass lesion. Right ovary  reportedly surgically absent. Left ovary is normal in size and  appearance with normal color flow. No significant free fluid.             Impression:       Unremarkable exam.     This report was finalized on 6/12/2019 12:38 AM by Tony Huynh M.D.       US Testicular or Ovarian Vascular Limited [[id number]] Resulted:  06/12/19 0028     Updated:  06/12/19 0028          EKG:  No orders to display       I reviewed the patient's new clinical results and medications.  I reviewed the patient's  new imaging results and agree with the interpretation.  I personally viewed and interpreted the patient's EKG/Telemetry data.      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       Assessment and Plan:       Colon cancer metastasized to multiple sites including liver, lung, iliac lymph nodes and bone (CMS/HCC)  She recently had cyberknife to spinal mets and has had progression in her lungs.   - plan for PET scan Monday per her oncology team  - consult oncology      Intractable abdominal pain  LLQ with unknown cause. She was consider a colonoscopy as outpatient to eval for polyp. Vaginal ultrasound showed no issues with left ovary and patient was reassured.   - consider follow up with oncology team for colonoscopy as planned  -Dilaudid PRN for pain  -Zofran for nausea       Left hip pain  New and radiation down anterior left hip. Concern for possible mets to bone in that region causing pain.   - xray left hip      L3 vertebral fracture (CMS/HCC)  Chronic. S/p cyberknife to spine for bone mets and unsure if in that exact region. Denies any bladder or bowel dysfunction.   - MRI lumbar spine for further evaluation  -PRN Ativan for anxiety prior to MRI    Admit to oncology unit  DVT prophylaxis: SCD's  Code status addressed: full code  Diet: regular    I discussed the patients findings and my recommendations with patient, family and nursing staff.      SATNAM Rivera  [facility]  06/12/19  1:12 PM    Dictated utilizing  Dragon dictation    Addendum: I have reviewed the history and plan as obtained by SATNAM Rivera. I have personally examined the patient. I have reviewed available clinical results, imaging results, EKG/Telemetry results, and prior records. My exam confirms her physical findings and I agree with the plan as listed above, with the addition of the following:    Pain was LLQ and then she though related to left kidney and left side of her back. Had radiation down left leg to level of knee. She reports pain is improved some now. She just got back from MRI and is drowsy for the ativan needed to tolerate imaging. Hip XR without lesion or fracture. Consulted Oncology to review her case and workup so far. Will continue pain and spasm control.    Hamzah Mason MD  [facility]  06/12/19  4:29 PM

## 2019-06-12 NOTE — PLAN OF CARE
Problem: Patient Care Overview  Goal: Plan of Care Review  Outcome: Ongoing (interventions implemented as appropriate)   06/12/19 3666   Coping/Psychosocial   Plan of Care Reviewed With patient   Plan of Care Review   Progress no change   OTHER   Outcome Summary Pt had significant pain on shift this AM. Home medication was given and lidocaine patches appled.Pt resting now. VSS. MRI and xray were done. Will continue to monitor pt.

## 2019-06-12 NOTE — ED PROVIDER NOTES
EMERGENCY DEPARTMENT ENCOUNTER    CHIEF COMPLAINT  Chief Complaint: Abdominal pain  History given by: patient   History limited by: n/a   Room Number: P389/1  PMD: Jase Warner Dr OBG[facility]    HPI:  Pt is a 44 y.o. female who presents complaining of LLQ abd pain that began at 1700 tonight. Pt states that the pain radiates into her back and into the LLE. Pt has a hx of metastatic colon cancer (on chemo) with mets to the liver and bone. Pt's spouse states that her recent CT shows a hairline fracture at L3. Pt also states that she has a hx of ovarian cysts, and recently had her right ovary removed. Pt states that she took her home pain medication without relief.     Duration:  4 hours   Onset: gradual  Timing: constant   Location: LLQ   Radiation: none  Quality: pain  Intensity/Severity: severe   Progression: unchanged   Associated Symptoms: none  Aggravating Factors: none  Alleviating Factors: none    PAST MEDICAL HISTORY  Active Ambulatory Problems     Diagnosis Date Noted   • Pelvic pain 03/29/2019   • Abscess of ovary 06/10/2011   • Adenocarcinoma of rectum (CMS/HCC) 05/20/2011   • Encounter for colonoscopy due to history of colon cancer 03/18/2014   • Cancer (CMS/HCC) 12/01/2016   • Encounter for screening for malignant neoplasm of colon 03/18/2014   • History of malignant neoplasm of rectum 03/27/2012   • Malignant neoplasm of sigmoid colon (CMS/HCC) 12/03/2015   • Secondary adenocarcinoma of liver (CMS/HCC) 04/03/2018   • 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   • Secondary malignant neoplasm of right lung (CMS/HCC) 04/03/2018   • 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   • Colon cancer metastasized to multiple sites including liver, lung and bone (CMS/HCC) 03/29/2019   • Right upper quadrant pain 03/29/2019   • Anemia due to chemotherapy 03/29/2019   • Pelvic fluid collection 04/08/2019     Resolved Ambulatory Problems      Diagnosis Date Noted   • No Resolved Ambulatory Problems     Past Medical History:   Diagnosis Date   • Cancer (CMS/HCC)    • Pneumothorax        PAST SURGICAL HISTORY  Past Surgical History:   Procedure Laterality Date   • CHEST TUBE INSERTION     • COLON RESECTION      COLORECTAL RESECTION       FAMILY HISTORY  No family history on file.    SOCIAL HISTORY  Social History     Socioeconomic History   • Marital status:      Spouse name: Not on file   • Number of children: Not on file   • Years of education: Not on file   • Highest education level: Not on file   Tobacco Use   • Smoking status: Never Smoker   • Smokeless tobacco: Never Used   Substance and Sexual Activity   • Alcohol use: No   • Drug use: No   • Sexual activity: Defer       ALLERGIES  Patient has no known allergies.    REVIEW OF SYSTEMS  Review of Systems   Constitutional: Negative for fever.   HENT: Negative for sore throat.    Eyes: Negative.    Respiratory: Negative for cough and shortness of breath.    Cardiovascular: Negative for chest pain.   Gastrointestinal: Positive for abdominal pain. Negative for diarrhea and vomiting.   Genitourinary: Negative for dysuria.   Musculoskeletal: Negative for neck pain.   Skin: Negative for rash.   Allergic/Immunologic: Negative.    Neurological: Negative for weakness, numbness and headaches.   Hematological: Negative.    Psychiatric/Behavioral: Negative.    All other systems reviewed and are negative.      PHYSICAL EXAM  ED Triage Vitals   Temp Heart Rate Resp BP SpO2   06/11/19 2220 06/11/19 2220 06/11/19 2220 06/11/19 2252 06/11/19 2220   98.9 °F (37.2 °C) 110 20 115/88 95 %      Temp src Heart Rate Source Patient Position BP Location FiO2 (%)   -- -- 06/11/19 2311 -- --     Sitting         Physical Exam   Constitutional: She is oriented to person, place, and time. She appears distressed (moderate, due to pain).   HENT:   Head: Normocephalic and atraumatic.   Eyes: EOM are normal. Pupils are equal,  round, and reactive to light.   Neck: Normal range of motion. Neck supple.   Cardiovascular: Normal rate, regular rhythm and normal heart sounds.   Pulmonary/Chest: Effort normal and breath sounds normal. No respiratory distress.   Abdominal: Soft. There is tenderness in the left lower quadrant. There is no rebound and no guarding.   Musculoskeletal: Normal range of motion. She exhibits no edema.   Neurological: She is alert and oriented to person, place, and time. She has normal sensation and normal strength.   Skin: Skin is warm and dry. No rash noted.   Psychiatric: Mood and affect normal.   Nursing note and vitals reviewed.      LAB RESULTS  Lab Results (last 24 hours)     Procedure Component Value Units Date/Time    CBC & Differential [[id number]] Collected:  06/11/19 2250    Specimen:  Blood Updated:  06/11/19 2303    Narrative:       The following orders were created for panel order CBC & Differential.  Procedure                               Abnormality         Status                     ---------                               -----------         ------                     CBC Auto Differential[[id number]]        Abnormal            Final result                 Please view results for these tests on the individual orders.    Comprehensive Metabolic Panel [[id number]]  (Abnormal) Collected:  06/11/19 2250    Specimen:  Blood Updated:  06/11/19 2333     Glucose 110 mg/dL      BUN 7 mg/dL      Creatinine 0.77 mg/dL      Sodium 141 mmol/L      Potassium 4.0 mmol/L      Chloride 106 mmol/L      CO2 23.4 mmol/L      Calcium 8.4 mg/dL      Total Protein 7.1 g/dL      Albumin 4.10 g/dL      ALT (SGPT) 6 U/L      AST (SGOT) 16 U/L      Alkaline Phosphatase 88 U/L      Total Bilirubin 0.2 mg/dL      eGFR Non African Amer 82 mL/min/1.73      Globulin 3.0 gm/dL      A/G Ratio 1.4 g/dL      BUN/Creatinine Ratio 9.1     Anion Gap 11.6 mmol/L     Narrative:       GFR Normal >60  Chronic Kidney Disease <60  Kidney Failure <15     Lipase [[id number]]  (Normal) Collected:  06/11/19 2250    Specimen:  Blood Updated:  06/11/19 2321     Lipase 35 U/L     CBC Auto Differential [[id number]]  (Abnormal) Collected:  06/11/19 2250    Specimen:  Blood Updated:  06/11/19 2303     WBC 8.06 10*3/mm3      RBC 3.99 10*6/mm3      Hemoglobin 10.8 g/dL      Hematocrit 35.9 %      MCV 90.0 fL      MCH 27.1 pg      MCHC 30.1 g/dL      RDW 26.2 %      RDW-SD 84.3 fl      MPV 9.7 fL      Platelets 269 10*3/mm3      Neutrophil % 71.5 %      Lymphocyte % 16.3 %      Monocyte % 9.7 %      Eosinophil % 1.9 %      Basophil % 0.1 %      Immature Grans % 0.5 %      Neutrophils, Absolute 5.77 10*3/mm3      Lymphocytes, Absolute 1.31 10*3/mm3      Monocytes, Absolute 0.78 10*3/mm3      Eosinophils, Absolute 0.15 10*3/mm3      Basophils, Absolute 0.01 10*3/mm3      Immature Grans, Absolute 0.04 10*3/mm3      nRBC 0.0 /100 WBC     Urinalysis With Microscopic If Indicated (No Culture) - Urine, Clean Catch [[id number]]  (Abnormal) Collected:  06/11/19 2356    Specimen:  Urine, Clean Catch Updated:  06/12/19 0026     Color, UA Yellow     Appearance, UA Clear     pH, UA <=5.0     Specific Gravity, UA >=1.030     Glucose, UA Negative     Ketones, UA Trace     Bilirubin, UA Negative     Blood, UA Negative     Protein, UA Negative     Leuk Esterase, UA Small (1+)     Nitrite, UA Negative     Urobilinogen, UA 0.2 E.U./dL    Urinalysis, Microscopic Only - Urine, Clean Catch [[id number]]  (Abnormal) Collected:  06/11/19 2356    Specimen:  Urine, Clean Catch Updated:  06/12/19 0033     RBC, UA 0-2 /HPF      WBC, UA 6-12 /HPF      Bacteria, UA None Seen /HPF      Squamous Epithelial Cells, UA 3-6 /HPF      Hyaline Casts, UA 7-12 /LPF      Methodology Automated Microscopy    Basic Metabolic Panel [[id number]]  (Abnormal) Collected:  06/12/19 0440    Specimen:  Blood Updated:  06/12/19 0545     Glucose 97 mg/dL      BUN 6 mg/dL      Creatinine 0.54 mg/dL      Sodium 141 mmol/L       Potassium 4.3 mmol/L      Chloride 109 mmol/L      CO2 23.9 mmol/L      Calcium 7.4 mg/dL      eGFR Non African Amer 124 mL/min/1.73      BUN/Creatinine Ratio 11.1     Anion Gap 8.1 mmol/L     Narrative:       GFR Normal >60  Chronic Kidney Disease <60  Kidney Failure <15    CBC (No Diff) [[id number]]  (Abnormal) Collected:  06/12/19 0440    Specimen:  Blood Updated:  06/12/19 0525     WBC 6.95 10*3/mm3      RBC 3.54 10*6/mm3      Hemoglobin 9.5 g/dL      Hematocrit 32.7 %      MCV 92.4 fL      MCH 26.8 pg      MCHC 29.1 g/dL      RDW 25.7 %      RDW-SD 85.0 fl      MPV 9.1 fL      Platelets 232 10*3/mm3           I ordered the above labs and reviewed the results    RADIOLOGY  US Non-ob Transvaginal   Final Result   Unremarkable exam.       This report was finalized on 6/12/2019 12:38 AM by Tony Huynh M.D.          US Testicular or Ovarian Vascular Limited    (Results Pending)        I ordered the above noted radiological studies. Interpreted by radiologist.  Reviewed by me in PACS.       PROCEDURES  Procedures      PROGRESS AND CONSULTS       22:25  CMP, CBC, lipase, and UA ordered.     22:31  Dilaudid and Zofran ordered to treat pain.     22:57  Dilaudid ordered to treat pain.     23:17  BP- 137/90 HR- 83 Temp- 98.9 °F (37.2 °C) O2 sat- 98%  Informed pt of the plan for labs and U/S to r/o ovarian torsion. Will treat pain. Pt understands and agrees with the plan, all questions answered.    23:29  IVF ordered for hydration. Dilaudid ordered to treat pain. U/S pelvis ordered.     01:38  Call placed to [facility] for admission.    01:40  BP- 139/87 HR- 70 Temp- 98.9 °F (37.2 °C) O2 sat- 98%  Rechecked the patient who is in NAD and is resting comfortably. Pt reports some improvement of the pain. Informed pt that the workup in the ED shows NAD. Plan for admission for pain control and further evaluation. Pt understands and agrees with the plan, all questions answered.    01:42  Discussed pt's case with SATNAM Sheldon  (A), who agrees to admit the pt       MEDICAL DECISION MAKING  Results were reviewed/discussed with the patient and they were also made aware of online access. Pt also made aware that some labs, such as cultures, will not be resulted during ER visit and follow up with PMD is necessary.     MDM  Number of Diagnoses or Management Options     Amount and/or Complexity of Data Reviewed  Clinical lab tests: ordered and reviewed (WBC=8.06)  Tests in the radiology section of CPT®: ordered and reviewed (U/S pelvis shows NAD)  Decide to obtain previous medical records or to obtain history from someone other than the patient: yes  Review and summarize past medical records: yes (Last admitted on 4/6/19 s/t pelvic pain, colon cancer, and right ovarian cyst. )  Discuss the patient with other providers: yes ( SATNAM Sheldon ([facility]))    Patient Progress  Patient progress: stable          DIAGNOSIS  Final diagnoses:   Intractable abdominal pain   Metastatic cancer (CMS/HCC)       DISPOSITION  ADMISSION    Discussed treatment plan and reason for admission with pt/family and admitting physician.  Pt/family voiced understanding of the plan for admission for further testing/treatment as needed.     Latest Documented Vital Signs:  As of 5:46 AM  BP- 131/88 HR- 93 Temp- 96.7 °F (35.9 °C) (Oral) O2 sat- 96%    --  Documentation assistance provided by luis e Brown for Dr Buenrostro.  Information recorded by the luis e was done at my direction and has been verified and validated by me.        Mena Brown  06/12/19 0144       Luc Buenrostro MD  06/12/19 0563

## 2019-06-12 NOTE — CONSULTS
.     REASON FOR CONSULTATION:   metastatic rectal cancer with worsened pain  Provide an opinion on any further workup or treatment                             REQUESTING PHYSICIAN: Shiraz Ramirez MD  RECORDS OBTAINED:  Records of the patients history including those from the electronic medical record were reviewed and summarized in detail.    HISTORY OF PRESENT ILLNESS:  The patient is a 44 y.o. year old female  who is here for follow-up with the above-mentioned history.    She is a young lady that has metastatic colorectal cancer, initially diagnosed 2011.  She receives treatment through the [facility].  She came to the emergency room at [facility] last night with LLQ abdominal pain that began a few hours prior, radiating to the back and into the LLE.    Recent L3 fracture, managed with pain medicines.  PET scan was planned for Monday.  Recent right ovary removed due to a painful cyst.    MRI lumbar spine performed today.    Last CT AP at [facility] was 3/28/2019.    Chronic ongoing pain issues.  However, she states the LLQ pain that is currently present, traveling to her left leg and left back is new.  States she has not had this pain before.                                                                                           States she has chronic issues with constipation.  Usually only having a bowel movement every 3 days or so.  States last CT was at the [facility] on Thursday of last week.  States that that time she was having abdominal pain but it was not localized to the LLQ.  She thought that abdominal pain was due to constipation.  At that time she was having more constipation than usual.                                                                                                                                                            Past Medical History:   Diagnosis Date   • Cancer (CMS/HCC)     METASTATIC STAGE 4 COLORECTAL TO LIVER AND LUNGS   • Pneumothorax      RIGHT LUNG     Past Surgical History:   Procedure Laterality Date   • CHEST TUBE INSERTION     • COLON RESECTION      COLORECTAL RESECTION       MEDICATIONS    Current Facility-Administered Medications:   •  acetaminophen (TYLENOL) tablet 650 mg, 650 mg, Oral, Q4H PRN, Belgica Carrington APRN  •  cetirizine (zyrTEC) tablet 10 mg, 10 mg, Oral, Daily, Monika Romero APRN  •  HYDROmorphone (DILAUDID) injection 1 mg, 1 mg, Intravenous, Q2H PRN, Shiraz Ramirez MD, 1 mg at 06/12/19 1344  •  HYDROmorphone (DILAUDID) tablet 1 mg, 1 mg, Oral, Q4H PRN, Hamzah Mason MD, 1 mg at 06/12/19 1235  •  lactobacillus acidophilus (RISAQUAD) capsule 1 capsule, 1 capsule, Oral, Daily, Hamzah Mason MD  •  lidocaine (LIDODERM) 5 % 1 patch, 1 patch, Transdermal, Q24H, Hamzah Mason MD  •  LORazepam (ATIVAN) tablet 0.5 mg, 0.5 mg, Oral, Q6H PRN, Hamzah Mason MD  •  mirtazapine (REMERON) tablet 15 mg, 15 mg, Oral, Nightly, Hamzah Mason MD  •  ondansetron (ZOFRAN) tablet 4 mg, 4 mg, Oral, Q6H PRN **OR** ondansetron (ZOFRAN) injection 4 mg, 4 mg, Intravenous, Q6H PRN, Belgica Carrington APRN, 4 mg at 06/12/19 0352  •  sennosides-docusate sodium (SENOKOT-S) 8.6-50 MG tablet 2 tablet, 2 tablet, Oral, BID PRN, Hamzah Mason MD  •  sodium chloride 0.9 % flush 1-10 mL, 1-10 mL, Intravenous, PRN, Belgica Carrington APRN  •  sodium chloride 0.9 % flush 3 mL, 3 mL, Intravenous, Q12H, Belgica Carrington APRN, 3 mL at 06/12/19 0802  •  vitamin C (ASCORBIC ACID) tablet 1,000 mg, 1,000 mg, Oral, Daily, Hamzah Mason MD    ALLERGIES:   No Known Allergies    SOCIAL HISTORY:       Social History     Socioeconomic History   • Marital status:      Spouse name: Not on file   • Number of children: Not on file   • Years of education: Not on file   • Highest education level: Not on file   Tobacco Use   • Smoking status: Never Smoker   • Smokeless tobacco: Never Used   Substance and Sexual Activity   • Alcohol use:  "No   • Drug use: No   • Sexual activity: Defer         FAMILY HISTORY:  No family history on file.    REVIEW OF SYSTEMS:  Review of Systems   Constitutional: Positive for fatigue. Negative for activity change.   HENT: Negative for nosebleeds and trouble swallowing.    Respiratory: Negative for shortness of breath and wheezing.    Cardiovascular: Negative for chest pain and palpitations.   Gastrointestinal: Positive for constipation. Negative for diarrhea and nausea.   Genitourinary: Negative for dysuria and hematuria.   Musculoskeletal: Negative for arthralgias and myalgias.   Skin: Negative for rash and wound.   Neurological: Negative for seizures and syncope.   Hematological: Negative for adenopathy. Does not bruise/bleed easily.   Psychiatric/Behavioral: Negative for confusion.              Vitals:    06/12/19 0123 06/12/19 0239 06/12/19 0309 06/12/19 0736   BP: 139/87 120/83 131/88 109/74   BP Location:  Right arm Left arm Left arm   Patient Position:  Sitting Lying Lying   Pulse: 70 76 93 87   Resp: 16 14 18 18   Temp:  97.7 °F (36.5 °C) 96.7 °F (35.9 °C) 97.7 °F (36.5 °C)   TempSrc:  Oral Oral Oral   SpO2: 98% 96% 96% 97%   Weight:   47 kg (103 lb 11.2 oz)    Height:   167.6 cm (66\")      No flowsheet data found.   PHYSICAL EXAM:    CONSTITUTIONAL:  Vital signs reviewed.  No distress, looks comfortable.  EYES:  Conjunctiva and lids unremarkable.  PERRLA  EARS,NOSE,MOUTH,THROAT:  Ears and nose appear unremarkable.  Lips, teeth, gums appear unremarkable.  RESPIRATORY:  Normal respiratory effort.  Lungs clear to auscultation bilaterally.  CARDIOVASCULAR:  Normal S1, S2.  No murmurs rubs or gallops.  No significant lower extremity edema.  GASTROINTESTINAL: Abdomen appears unremarkable.  Nontender.  No hepatomegaly.  No splenomegaly.  NEURO: cranial nerves 2-12 grossly intact.  No focal deficits.  Appears to have equal strength all 4 extremities.  MUSCULOSKELETAL:  Unremarkable digits/nails.  No cyanosis or " clubbing.  SKIN:  Warm.  No rashes.  PSYCHIATRIC:  Normal judgment and insight.  Normal mood and affect.        RECENT LABS:        WBC   Date Value Ref Range Status   06/12/2019 6.95 3.40 - 10.80 10*3/mm3 Final   06/11/2019 8.06 3.40 - 10.80 10*3/mm3 Final     Hemoglobin   Date Value Ref Range Status   06/12/2019 9.5 (L) 12.0 - 15.9 g/dL Final   06/11/2019 10.8 (L) 12.0 - 15.9 g/dL Final     Platelets   Date Value Ref Range Status   06/12/2019 232 140 - 450 10*3/mm3 Final   06/11/2019 269 140 - 450 10*3/mm3 Final       Assessment/Plan   *Metastatic colorectal cancer, diagnosed 2011.  Receives treatment through the [facility].  Last treatment was FOLFIRI Erbitux.  States she did not did not tolerate Erbitux and therefore Erbitux was dropped with plans to resume FOLFIRI this month.    *LLQ abdominal pain rating to the left back and left leg.  MRI has been ordered by hospitalist service.  I will also order a CT of the abdomen and pelvis to evaluate this.  This could be related to her constipation issues.  She states she had a CT at the [facility] last Thursday and is scheduled for a PET scan at the [facility] this Monday.  We will look for any acute problem.    *Chronic constipation.                   Change Senokot-S to schedule II tablets nightly.    Plan  Await MRI results.  CT abdomen and pelvis  Upon discharge, she will return to the [facility] to continue her oncology care.                                               Multiple family members present and assisted with history.  Records reviewed and summarized.

## 2019-06-12 NOTE — ED TRIAGE NOTES
Pt c/o left flank pain and nausea since 1700 tonight. Pt has hx of colon ca with chelsea to liver and bone. On chemo. No fever. No difficulty urinating.

## 2019-06-12 NOTE — PLAN OF CARE
Problem: Pain, Chronic (Adult)  Goal: Acceptable Pain/Comfort Level and Functional Ability  Outcome: Ongoing (interventions implemented as appropriate)   06/12/19 0339   Pain, Chronic (Adult)   Acceptable Pain/Comfort Level and Functional Ability making progress toward outcome

## 2019-06-12 NOTE — ED NOTES
Pt  asking for 3rd dose of pain medication for patient. MD to bedside at this time.      Clarke Brush RN  06/11/19 9954

## 2019-06-13 VITALS
WEIGHT: 103.7 LBS | HEART RATE: 87 BPM | DIASTOLIC BLOOD PRESSURE: 74 MMHG | SYSTOLIC BLOOD PRESSURE: 96 MMHG | OXYGEN SATURATION: 99 % | HEIGHT: 66 IN | BODY MASS INDEX: 16.67 KG/M2 | TEMPERATURE: 98.7 F | RESPIRATION RATE: 16 BRPM

## 2019-06-13 PROBLEM — K59.00 CONSTIPATION: Status: ACTIVE | Noted: 2019-06-13

## 2019-06-13 PROCEDURE — 96376 TX/PRO/DX INJ SAME DRUG ADON: CPT

## 2019-06-13 PROCEDURE — 99214 OFFICE O/P EST MOD 30 MIN: CPT | Performed by: INTERNAL MEDICINE

## 2019-06-13 PROCEDURE — 25010000002 HYDROMORPHONE PER 4 MG: Performed by: HOSPITALIST

## 2019-06-13 PROCEDURE — 99203 OFFICE O/P NEW LOW 30 MIN: CPT | Performed by: INTERNAL MEDICINE

## 2019-06-13 PROCEDURE — G0378 HOSPITAL OBSERVATION PER HR: HCPCS

## 2019-06-13 RX ORDER — PANTOPRAZOLE SODIUM 40 MG/1
40 TABLET, DELAYED RELEASE ORAL AS NEEDED
COMMUNITY
End: 2019-08-28 | Stop reason: HOSPADM

## 2019-06-13 RX ORDER — DEXAMETHASONE 4 MG/1
4 TABLET ORAL
COMMUNITY
End: 2019-06-21 | Stop reason: HOSPADM

## 2019-06-13 RX ORDER — FEXOFENADINE HCL 180 MG/1
180 TABLET ORAL DAILY
COMMUNITY
End: 2019-08-28 | Stop reason: HOSPADM

## 2019-06-13 RX ORDER — PROCHLORPERAZINE MALEATE 10 MG
10 TABLET ORAL EVERY 6 HOURS PRN
COMMUNITY
End: 2019-08-28 | Stop reason: HOSPADM

## 2019-06-13 RX ORDER — ZOLEDRONIC ACID 0.04 MG/ML
4 INJECTION, SOLUTION INTRAVENOUS
COMMUNITY
End: 2019-08-28 | Stop reason: HOSPADM

## 2019-06-13 RX ORDER — HYDROCODONE BITARTRATE AND ACETAMINOPHEN 5; 325 MG/1; MG/1
1 TABLET ORAL EVERY 6 HOURS PRN
Qty: 12 TABLET | Refills: 0 | Status: SHIPPED | OUTPATIENT
Start: 2019-06-13 | End: 2019-06-21 | Stop reason: HOSPADM

## 2019-06-13 RX ORDER — IBUPROFEN 200 MG
400 TABLET ORAL 2 TIMES DAILY PRN
COMMUNITY
End: 2019-07-08 | Stop reason: HOSPADM

## 2019-06-13 RX ORDER — IBUPROFEN 600 MG/1
600 TABLET ORAL DAILY PRN
COMMUNITY
End: 2019-06-21 | Stop reason: HOSPADM

## 2019-06-13 RX ORDER — DOCUSATE SODIUM 100 MG/1
100 CAPSULE, LIQUID FILLED ORAL 2 TIMES DAILY
COMMUNITY
End: 2019-08-28 | Stop reason: HOSPADM

## 2019-06-13 RX ORDER — HYDROCODONE BITARTRATE AND ACETAMINOPHEN 5; 325 MG/1; MG/1
1 TABLET ORAL EVERY 6 HOURS PRN
Status: DISCONTINUED | OUTPATIENT
Start: 2019-06-13 | End: 2019-06-13 | Stop reason: HOSPADM

## 2019-06-13 RX ORDER — HYDROMORPHONE HYDROCHLORIDE 2 MG/1
2 TABLET ORAL EVERY 4 HOURS PRN
COMMUNITY
End: 2019-06-21 | Stop reason: HOSPADM

## 2019-06-13 RX ORDER — ONDANSETRON HYDROCHLORIDE 8 MG/1
8 TABLET, FILM COATED ORAL EVERY 8 HOURS PRN
COMMUNITY
End: 2019-08-28 | Stop reason: HOSPADM

## 2019-06-13 RX ADMIN — HYDROCODONE BITARTRATE AND ACETAMINOPHEN 1 TABLET: 5; 325 TABLET ORAL at 13:38

## 2019-06-13 RX ADMIN — OXYCODONE HYDROCHLORIDE AND ACETAMINOPHEN 1000 MG: 500 TABLET ORAL at 09:07

## 2019-06-13 RX ADMIN — Medication 500 UNITS: at 17:34

## 2019-06-13 RX ADMIN — SODIUM CHLORIDE, PRESERVATIVE FREE 3 ML: 5 INJECTION INTRAVENOUS at 09:13

## 2019-06-13 RX ADMIN — HYDROMORPHONE HYDROCHLORIDE 1 MG: 1 INJECTION, SOLUTION INTRAMUSCULAR; INTRAVENOUS; SUBCUTANEOUS at 09:13

## 2019-06-13 RX ADMIN — HYDROMORPHONE HYDROCHLORIDE 1 MG: 1 INJECTION, SOLUTION INTRAMUSCULAR; INTRAVENOUS; SUBCUTANEOUS at 01:12

## 2019-06-13 RX ADMIN — HYDROMORPHONE HYDROCHLORIDE 1 MG: 1 INJECTION, SOLUTION INTRAMUSCULAR; INTRAVENOUS; SUBCUTANEOUS at 04:12

## 2019-06-13 RX ADMIN — HYDROMORPHONE HYDROCHLORIDE 1 MG: 1 INJECTION, SOLUTION INTRAMUSCULAR; INTRAVENOUS; SUBCUTANEOUS at 17:34

## 2019-06-13 RX ADMIN — LIDOCAINE 1 PATCH: 50 PATCH TOPICAL at 14:59

## 2019-06-13 RX ADMIN — Medication 1 CAPSULE: at 09:07

## 2019-06-13 RX ADMIN — HYDROMORPHONE HYDROCHLORIDE 1 MG: 1 INJECTION, SOLUTION INTRAMUSCULAR; INTRAVENOUS; SUBCUTANEOUS at 12:01

## 2019-06-13 NOTE — PROGRESS NOTES
Progress Note    Name: Vita Ren ADMIT: 2019   : 1975  PCP: Jase Warner    MRN: [id number] LOS: 1 days   AGE/SEX: 44 y.o. female  ROOM: [facility]9/   Date of Encounter Visit: 19    Subjective:     Interval History: MRi lumbar spine, hip xray and oncology consult.     REVIEW OF SYSTEMS:   no fever or chills  No chest pain, palpitations or edema.   No shortness of breath, cough or sputum.   No nausea, vomiting or diarrhea.   Passing flatus. No BM today.   Still has pelvic and LLQ pain. Ambulating better and no more left hip pain    Objective:   Temp:  [96.7 °F (35.9 °C)-97.7 °F (36.5 °C)] 97.7 °F (36.5 °C)  Heart Rate:  [77-86] 77  Resp:  [16-18] 16  BP: ()/(62-80) 98/62   SpO2:  [95 %-96 %] 96 %  on    Device (Oxygen Therapy): room air    Intake/Output Summary (Last 24 hours) at 2019 1208  Last data filed at 2019 0041  Gross per 24 hour   Intake --   Output 1500 ml   Net -1500 ml     Body mass index is 16.74 kg/m².      19  2303 19  0309   Weight: 49 kg (108 lb) 47 kg (103 lb 11.2 oz)     Weight change:     Physical Exam   Constitutional: She appears cachectic. No distress.   HENT:   Nose: Nose normal.   Mouth/Throat: Oropharynx is clear and moist.   Eyes: Conjunctivae are normal.   Cardiovascular: Normal rate and regular rhythm.   No murmur heard.  Pulmonary/Chest: Effort normal. She has no wheezes.   Abdominal: Soft. Bowel sounds are normal. There is tenderness (suprapubic).   Musculoskeletal: She exhibits no edema.   Good ROM of left hip today with no more tenderness/pain   Neurological: She is alert.   Skin: Skin is warm and dry. She is not diaphoretic.       Results Review:      Results from last 7 days   Lab Units 19  0440 19  2250   SODIUM mmol/L 141 141   POTASSIUM mmol/L 4.3 4.0   CHLORIDE mmol/L 109* 106   CO2 mmol/L 23.9 23.4   BUN mg/dL 6 7   CREATININE mg/dL 0.54* 0.77   GLUCOSE mg/dL 97 110*   CALCIUM mg/dL 7.4* 8.4*   AST (SGOT) U/L  --  16    ALT (SGPT) U/L  --  6     Estimated Creatinine Clearance: 100.7 mL/min (A) (by C-G formula based on SCr of 0.54 mg/dL (L)).                            Invalid input(s):  PHOS        Invalid input(s): LDLCALC  Results from last 7 days   Lab Units 06/12/19  0440 06/11/19  2250   WBC 10*3/mm3 6.95 8.06   HEMOGLOBIN g/dL 9.5* 10.8*   HEMATOCRIT % 32.7* 35.9   PLATELETS 10*3/mm3 232 269   MCV fL 92.4 90.0   MCH pg 26.8 27.1   MCHC g/dL 29.1* 30.1*   RDW % 25.7* 26.2*   RDW-SD fl 85.0* 84.3*   MPV fL 9.1 9.7   NEUTROPHIL % %  --  71.5   LYMPHOCYTE % %  --  16.3*   MONOCYTES % %  --  9.7   EOSINOPHIL % %  --  1.9   BASOPHIL % %  --  0.1   IMM GRAN % %  --  0.5   NEUTROS ABS 10*3/mm3  --  5.77   LYMPHS ABS 10*3/mm3  --  1.31   MONOS ABS 10*3/mm3  --  0.78   EOS ABS 10*3/mm3  --  0.15   BASOS ABS 10*3/mm3  --  0.01   IMMATURE GRANS (ABS) 10*3/mm3  --  0.04   NRBC /100 WBC  --  0.0                     Results from last 7 days   Lab Units 06/11/19  2250   LIPASE U/L 35             Results from last 7 days   Lab Units 06/11/19  2356   NITRITE UA  Negative   WBC UA /HPF 6-12*   BACTERIA UA /HPF None Seen   SQUAM EPITHEL UA /HPF 3-6*           Imaging:  Imaging Results (last 24 hours)     Procedure Component Value Units Date/Time    US Non-ob Transvaginal [[id number]] Collected:  06/12/19 0037     Updated:  06/13/19 0900    Narrative:       PELVIC ULTRASOUND     CLINICAL HISTORY: pain. Prior right oophorectomy     COMPARISON: none.     FINDINGS: Longitudinal and transverse images of the pelvis were obtained  in both transabdominal and transvaginal fashions. Uterus measures 6.8 x  5.8 x 4.3 cm and has a retroverted configuration. Endometrial thickness  is 9 mm. Trace endometrial fluid. No discrete mass lesion. Right ovary  reportedly surgically absent. Left ovary is normal in size and  appearance with normal color flow. No significant free fluid.             Impression:       Unremarkable exam.     This report was finalized on  6/12/2019 12:38 AM by Tony Huynh M.D.       US Testicular or Ovarian Vascular Limited [[id number]] Collected:  06/12/19 0037     Updated:  06/13/19 0900    Narrative:       PELVIC ULTRASOUND     CLINICAL HISTORY: pain. Prior right oophorectomy     COMPARISON: none.     FINDINGS: Longitudinal and transverse images of the pelvis were obtained  in both transabdominal and transvaginal fashions. Uterus measures 6.8 x  5.8 x 4.3 cm and has a retroverted configuration. Endometrial thickness  is 9 mm. Trace endometrial fluid. No discrete mass lesion. Right ovary  reportedly surgically absent. Left ovary is normal in size and  appearance with normal color flow. No significant free fluid.             Impression:       Unremarkable exam.     This report was finalized on 6/12/2019 12:38 AM by Tony Huynh M.D.       CT Abdomen Pelvis With Contrast [[id number]] Collected:  06/12/19 2237     Updated:  06/13/19 0546    Narrative:       CT SCANS ABDOMEN AND PELVIS     HISTORY: Has metastatic colorectal cancer.  New LLQ pain radiating to  left back and left leg.  (Had an MRI 6/12/2019).  [facility] scan at the  [facility] last Thursday.  Family has the images.  If  possible, please compare; R10.9-Unspecified abdominal pain;  C79.9-Secondary malignant neoplasm of unspecified site     COMPARISON: 03/28/2019.     TECHNIQUE: Radiation dose reduction techniques were utilized, including  automated exposure control and exposure modulation based on body size.  Axial images were obtained from the lung bases to the symphysis pubis  with oral and IV contrast.     FINDINGS:  Very small pleural effusions have developed. Irregular,  spiculated nodules in the lung bases bilaterally have increased. For  example one of the larger lesions in the left lower lobe on image 10 now  measures 1.6 x 1.3 cm, previously 1.4 x 1.0 cm. There is some motion  artifact on sections obtained through the lower abdomen. A subcentimeter  low density liver  lesion on image 35 is noted and likely a tiny cyst. In  retrospect it appears to have been present on the previous exam although  it was quite subtle. The remaining solid organs are grossly unremarkable  considering some motion artifact. Aorta is nonaneurysmal.     The unopacified GI tract is nonobstructive. Mild constipation. There are  postsurgical changes at the level of the distal rectum. A somewhat  circumscribed collection of fluid in the posterior pelvis adjacent to  the rectum has diminished in size now measuring 3.7 x 1.9 cm, previously  7.8 x 5.7 cm. There is some peripheral enhancement but no air or  air-fluid level internally. The decrease in size would favor a benign  etiology although continued surveillance is recommended.             Impression:       1. Development of small pleural effusions with an interval increase in  suspected basilar pulmonary metastatic disease.  2. Stable tiny liver lesion, likely cyst.  3. Mild constipation without obstruction.  4. Interval decrease in size of a circumscribed perirectal fluid  collection with current and prior dimensions as above.      This report was finalized on 6/13/2019 5:43 AM by Tony Huynh M.D.       XR Hip With or Without Pelvis 2 - 3 View Left [[id number]] Collected:  06/12/19 1518     Updated:  06/12/19 1834    Narrative:       PELVIS AND LEFT HIP X-RAY     HISTORY: Left hip pain. Colorectal cancer.     TECHNIQUE: An AP view of the pelvis and 2 images of the left hip are  provided.     FINDINGS: There are chain sutures at the central pelvis. The bowel gas  pattern is normal. The bones and the joints around the hips and the  pelvis appear normal. There is no evidence of fracture. There is no bone  lesion.       Impression:       Negative.     This report was finalized on 6/12/2019 6:30 PM by Dr. Kamran Barbosa M.D.       MRI Lumbar Spine With & Without Contrast [[id number]] Collected:  06/12/19 1543     Updated:  06/12/19 1555    Narrative:        MRI LUMBAR SPINE W WO CONTRAST-     CLINICAL HISTORY: 44-year-old female with history of metastatic rectal  carcinoma. Follow-up L3 compression fracture seen on CT scan of the  abdomen performed at another facility.     TECHNIQUE: Sagittal T1, T2 and proton-density-weighted images were  obtained. Axial T1 and T2-weighted images were also acquired. Sagittal  and axial T1-weighted images were obtained after injection of MultiHance  contrast.      COMPARISON: CT scan of the abdomen and pelvis from another facility  dated 05/30/2019.     FINDINGS: There is somewhat heterogeneous abnormal signal intensity and  enhancement throughout most of the L2 vertebral body consistent with  metastatic disease. A metastatic lesion appears sclerotic on the CT  images. There is also evidence of a metastatic lesion within the anal 3  vertebral body with there is a pathologic fracture involving the  superior to inferior endplates. Diminished signal is present within the  anterior aspect of the vertebral body consistent with the presence of  kyphoplasty cement which is new since the CT. There is approximately 75%  loss of height centrally just posterior to the kyphoplasty cement. The  posterior cortex remains intact at both levels. Neither fracture results  in any encroachment on the dural sac. There is no tumor extension beyond  the confines of bone. There is no significant disc bulging or focal disc  herniation at any level within the lumbar spine. There is no spinal  stenosis or foraminal encroachment. There is normal alignment. The conus  is normal in position and configuration.       Impression:       Metastatic lesions within the L2 and L3 vertebral bodies  with a pathologic compression fracture of L3 as described more detail  above. There is no significant encroachment on the spinal canal or  neural foramina at any level within the lumbar spine.     This report was finalized on 6/12/2019 3:52 PM by Dr. Efrain Pineda M.D.              I reviewed the patient's new clinical results and medications.         Medication Review:   Scheduled Meds:  cetirizine 10 mg Oral Nightly   lactobacillus acidophilus 1 capsule Oral Daily   lidocaine 1 patch Transdermal Q24H   mirtazapine 15 mg Oral Nightly   sennosides-docusate sodium 2 tablet Oral Nightly   sodium chloride 3 mL Intravenous Q12H   vitamin C 1,000 mg Oral Daily     Continuous Infusions:   PRN Meds:.•  acetaminophen  •  HYDROcodone-acetaminophen  •  HYDROmorphone  •  HYDROmorphone  •  LORazepam  •  ondansetron **OR** ondansetron  •  sodium chloride    Problem List:     Active Hospital Problems    Diagnosis  POA   • Intractable abdominal pain [R10.9]  Yes   • Left hip pain [M25.552]  Unknown   • L3 vertebral fracture (CMS/HCC) [S32.039A]  Unknown   • Colon cancer metastasized to multiple sites including liver, lung, iliac lymph nodes and bone (CMS/HCC) [C18.9]  Yes   • Secondary malignant neoplasm of right lung (CMS/HCC) [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       Assessment and Plan:       Colon cancer metastasized to multiple sites including liver, lung, iliac lymph nodes and bone (CMS/HCC)  She recently had cyberknife to spinal mets. MRI of lumbar spine showed L2 and L3 spinal mets. CT abdomen showed bilateral small effusion with progression of lung changes.   - plan for PET scan outpatient per her oncology team  - appreciate oncology input.        Intractable abdominal pain/constipation  LLQ with unknown cause. Vaginal ultrasound showed no issues with left ovary and patient was reassured. She does have chronic constipation and reports worsening after iron infusions which is less likely. She is on chronic opioids and takes laxatives and stool softeners regularly. Has not had a colonoscopy in 5 years and is insistent on getting a colonoscopy as soon as possible given worsening pain and  constipation issues. She does have chronic uterine cramping and was considering placement of IUD as outpatient. However given her pain issues and cramping I am not sure this will be of much benefit and may worsen cramps initially, but will defer to GYN as outpatient.  - will consult GI and may consider amitiza to help with opioid induced constipation. Will have them weigh in on need for colonoscopy as I do not feel that it is an urgent matter and can be done as outpatient.   -Dilaudid PRN for pain- will try one dose of hydrocodone as was previously more helpful for constipation pain in the past.   Encouraged to transition to oral meds today.   -Zofran for nausea        Left hip pain  Improved with no further pain in leg. Hip xray was normal.        L3 vertebral fracture (CMS/HCC)  Chronic. S/p cyberknife to spine for bone mets and MRI shows persistent L2-L3 lesions with compression fracture at L3, but there is no spinal canal issues. She does have some loss of height and may consider another kyphoplasty. However, she really has not had much back pain and is complaining more of lower abdominal pain.    Denies any bladder or bowel dysfunction.   - follow up with oncology team and consider follow up with neurosurgery should she develop any worsening back pain.     VTE prophylaxis: SCDs   CODE status: full code  Disposition: may consider discharge later today or in am so can get other outside testing done as planned.     I discussed the patients findings and my recommendations with patient and family.    SATNAM Rivera  06/13/19  12:08 PM

## 2019-06-13 NOTE — PROGRESS NOTES
Clinical Pharmacy Services: Medication History    Vita Ren is a 44 y.o. female presenting to [facility] for Metastatic cancer (CMS/HCC) [C79.9]  Intractable abdominal pain [R10.9]    She  has a past medical history of Cancer (CMS/HCC) and Pneumothorax.    Allergies as of 06/11/2019   • (No Known Allergies)       Medication information was obtained from: patient, medication bottles    Prior to Admission Medications     Prescriptions Last Dose Informant Patient Reported? Taking?    BROCCOLI EXTRACT PO  Self Yes Yes    Take 1 tablet by mouth Daily.    Calcium Citrate 150 MG capsule  Self Yes Yes    Take 1 capsule by mouth Daily.    Capsicum, Cayenne, (CAYENNE PEPPER PO)  Self Yes Yes    Take 1 tablet by mouth Daily.    CBD (cannabidiol) oral oil  Self Yes Yes    Take  by mouth 2 (Two) Times a Day. Takes 0.5 mg by mouth every morning and evening.    dexamethasone (DECADRON) 4 MG tablet  Self Yes Yes    Take 4 mg by mouth. Takes 1 tab twice daily on days 2 and 3 of chemotherapy as directed    docusate sodium (COLACE) 100 MG capsule  Self Yes Yes    Take 100 mg by mouth 2 (Two) Times a Day.    fexofenadine (ALLEGRA) 180 MG tablet  Self Yes Yes    Take 180 mg by mouth Daily.    Green Tea, Lorena sinensis, (GREEN TEA EXTRACT PO)  Self Yes Yes    Take 1 tablet by mouth Daily.    HYDROmorphone (DILAUDID) 2 MG tablet  Self Yes Yes    Take 2 mg by mouth Every 4 (Four) Hours As Needed for Moderate Pain . Takes 1-2 tabs as needed for pain    ibuprofen (ADVIL,MOTRIN) 200 MG tablet  Self Yes Yes    Take 400 mg by mouth 2 (Two) Times a Day As Needed for Mild Pain .    ibuprofen (ADVIL,MOTRIN) 600 MG tablet  Self Yes Yes    Take 600 mg by mouth Daily As Needed for Mild Pain . This is an old prescription she does not use often, but still has at home.    lidocaine (LIDODERM) 5 %  Self Yes Yes    Place 1 patch on the skin Daily. Remove & Discard patch within 12 hours or as directed by MD    LORazepam (ATIVAN) 0.5  MG tablet  Self Yes Yes    Take 0.5 mg by mouth Every 4 (Four) Hours As Needed for Anxiety. Reports using once daily    mirtazapine (REMERON) 15 MG tablet  Self Yes Yes    Take 15 mg by mouth Every Night.    multiple vitamin 10 mL in dextrose 5 % 1,000 mL  Self Yes Yes    Infuse  into a venous catheter 1 (One) Time Per Week.    norethindrone (MICRONOR) 0.35 MG tablet  Self Yes Yes    Take 1 tablet by mouth Every Evening.    ondansetron (ZOFRAN) 8 MG tablet  Self Yes Yes    Take  by mouth Every 8 (Eight) Hours As Needed for Nausea or Vomiting.    pantoprazole (PROTONIX) 40 MG EC tablet  Self Yes Yes    Take 40 mg by mouth As Needed. Only takes after chemo    Pramoxine HCl (TUCKS HEMORRHOIDAL RE)  Self Yes Yes    Insert 1 application into the rectum Every Other Day.    PREBIOTIC PRODUCT PO  Self Yes Yes    Take 1 tablet by mouth Daily.    PROBIOTIC PRODUCT PO  Self Yes Yes    Take 1 Scoop by mouth Daily.    prochlorperazine (COMPAZINE) 10 MG tablet  Self Yes Yes    Take 10 mg by mouth Every 6 (Six) Hours As Needed for Nausea or Vomiting.    sennosides-docusate sodium (SENOKOT-S) 8.6-50 MG tablet  Self Yes Yes    Take 1 tablet by mouth Every Evening.    TURMERIC PO  Self Yes Yes    Take 1 tablet by mouth Daily.    zoledronic acid (ZOMETA) 4 MG/100ML solution infusion (premix)  Self Yes Yes    Infuse 4 mg into a venous catheter Every 3 (Three) Months.            Medication notes: Home medication bottles brought in by patient, we reviewed these together. She takes several vitamins and supplements at home. She also has several old prescription bottles that she still uses on occasion (ibuprofen 600 mg). She takes the dexamethasone only on days 2-3 of her chemo cycle. She takes the pantoprazole as needed, only after chemo. She reports the mirtazapine works well for her mood. She has previously tried celexa and this was ineffective.    The following are medication bottles that the patient has with her that she states she is  "NO longer taking, but that she keeps on hand \"just in case\": doxycycline, cyclophosphamide, ferrasorb iron supplementation (currently receives IV iron infusions).    This medication list is complete to the best of my knowledge as of 6/13/2019    Please call if questions.    Jose Buenrostro, PharmAYO  6/13/2019 1:31 PM           "

## 2019-06-13 NOTE — PROGRESS NOTES
REASON FOR FOLLOWUP/CHIEF COMPLAINT:  Metastatic colorectal cancer    HISTORY OF PRESENT ILLNESS:   Abdominal discomfort persist.  CT and MRI and x-ray show no etiology of the pain.    Past Medical History, Past Surgical History, Social History, Family History have been reviewed and are without significant changes except as mentioned.    Review of Systems   Review of Systems   Constitutional: Negative for activity change.   HENT: Negative for nosebleeds and trouble swallowing.    Respiratory: Negative for shortness of breath and wheezing.    Cardiovascular: Negative for chest pain and palpitations.   Gastrointestinal: Positive for abdominal pain. Negative for constipation, diarrhea and nausea.   Genitourinary: Negative for dysuria and hematuria.   Musculoskeletal: Negative for arthralgias and myalgias.   Skin: Negative for rash and wound.   Neurological: Negative for seizures and syncope.   Hematological: Negative for adenopathy. Does not bruise/bleed easily.   Psychiatric/Behavioral: Negative for confusion.       Medications:  The current medication list was reviewed in the EMR    ALLERGIES:  No Known Allergies           Vitals:    06/12/19 1554 06/12/19 1941 06/12/19 2103 06/13/19 0354   BP: 120/80 (!) 89/64 102/73 122/80   BP Location: Left arm Left arm Left arm Left arm   Patient Position: Lying Lying Lying Lying   Pulse: 84 81  86   Resp: 18 18  16   Temp: 96.7 °F (35.9 °C) 97.6 °F (36.4 °C)  97.5 °F (36.4 °C)   TempSrc: Oral Oral  Oral   SpO2: 96% 95%  95%   Weight:       Height:         Physical Exam    CONSTITUTIONAL:  Vital signs reviewed.  No distress, looks comfortable.  EYES:  Conjunctiva and lids unremarkable.  PERRLA  EARS,NOSE,MOUTH,THROAT:  Ears and nose appear unremarkable.  Lips, teeth, gums appear unremarkable.  RESPIRATORY:  Normal respiratory effort.  Lungs clear to auscultation bilaterally.  CARDIOVASCULAR:  Normal S1, S2.  No murmurs rubs or gallops.  No significant lower extremity  edema.  GASTROINTESTINAL: Abdomen appears unremarkable.  Nontender.  No hepatomegaly.  No splenomegaly.  NEURO: cranial nerves 2-12 grossly intact.  No focal deficits.  Appears to have equal strength all 4 extremities.  MUSCULOSKELETAL:  Unremarkable digits/nails.  No cyanosis or clubbing.  SKIN:  Warm.  No rashes.  PSYCHIATRIC:  Normal judgment and insight.  Normal mood and affect.        RECENT LABS:  WBC   Date Value Ref Range Status   06/12/2019 6.95 3.40 - 10.80 10*3/mm3 Final   06/11/2019 8.06 3.40 - 10.80 10*3/mm3 Final     Hemoglobin   Date Value Ref Range Status   06/12/2019 9.5 (L) 12.0 - 15.9 g/dL Final   06/11/2019 10.8 (L) 12.0 - 15.9 g/dL Final     Platelets   Date Value Ref Range Status   06/12/2019 232 140 - 450 10*3/mm3 Final   06/11/2019 269 140 - 450 10*3/mm3 Final       ASSESSMENT/PLAN:      *Metastatic colorectal cancer, diagnosed 2011.  Receives treatment through the [facility].  Last treatment was FOLFIRI Erbitux.  States she did not did not tolerate Erbitux and therefore Erbitux was dropped with plans to resume FOLFIRI this month.     *LLQ abdominal pain rating to the left back and left leg.  MRI has been ordered by hospitalist service.  I will also order a CT of the abdomen and pelvis to evaluate this.  This could be related to her constipation issues.  She states she had a CT at the [facility] last Thursday and is scheduled for a PET scan at the [facility] this Monday.    CT AP and MRI L-spine performed here to look for an acute problem.  · CT AP 6/12/2019, from 3/28/2019: New small pleural effusions with increase in suspected basilar pulmonary metastatic disease.  Decrease in circumscribed perirectal fluid collection 3.7 x 1.9 cm, from 7.8 x 5.7 cm.  No clear etiology for the pain.  Mild constipation.  · CT images personally viewed by me.  · MRI L-spine 6/12/2019: L2 and L3 metastasis with pathologic compression fracture of L3 no significant  encroachment of spinal canal or neural foramina at any level in the lumbar spine.  · Left hip x-ray 6/12/2019: Negative.  · Therefore, no acute cause of the pain is found on CT AP or MRI L-spine.  Perhaps the pain is related to her mild constipation.  · She would like to have a colonoscopy to evaluate the pain.  CT scan shows no obstruction.  I suspect this could be done as an outpatient but would defer this to the hospitalist service.       *Chronic constipation.                   Change Senokot-S to schedule II tablets nightly.     Plan  Upon discharge, she will return to the [facility] to continue her oncology care.  She states she has an outpatient PET scan scheduled through the [facility] tomorrow.  I do not see a clear reason for continued inpatient admission.  If she is discharged today, she can have the PET scan as planned tomorrow.       If she is discharged the morning of the PET scan I am not sure that insurance would cover the cost of the PET scan the same day.            Multiple family members assisted with history.                 She is on drug therapy requiring extensive monitoring for toxicity

## 2019-06-13 NOTE — PLAN OF CARE
Problem: Patient Care Overview  Goal: Plan of Care Review  Outcome: Ongoing (interventions implemented as appropriate)   06/13/19 3195   Coping/Psychosocial   Plan of Care Reviewed With patient   Plan of Care Review   Progress no change   OTHER   Outcome Summary VSS; pt received iv pain med x1; pt had ct of abdomen and pelvis tonight; up multiple times to the bathroom; bed alarm on; pt slept on and off throughout the night; will continue to monitor       Problem: Pain, Chronic (Adult)  Goal: Acceptable Pain/Comfort Level and Functional Ability  Outcome: Ongoing (interventions implemented as appropriate)

## 2019-06-13 NOTE — DISCHARGE SUMMARY
Date of Admission: 6/11/2019  Date of Discharge:  6/13/2019  Primary Care Physician: Jase Warner     Discharge Diagnosis:  Active Hospital Problems    Diagnosis  POA   • Constipation [K59.00]  Yes   • Intractable abdominal pain [R10.9]  Yes   • Left hip pain [M25.552]  Yes   • L3 vertebral fracture (CMS/HCC) [S32.039A]  Yes   • Colon cancer metastasized to multiple sites including liver, lung, iliac lymph nodes and bone (CMS/HCC) [C18.9]  Yes   • Secondary malignant neoplasm of right lung (CMS/HCC) [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       Presenting Problem/History of Present Illness:  Metastatic cancer (CMS/HCC) [C79.9]  Intractable abdominal pain [R10.9]  Intractable abdominal pain [R10.9]     Vita Ren is a 44 y.o. female with a history of stage IV colon cancer with mets to liver, lungs and bone.  She was diagnosed with cancer 2011 has been undergoing chemo and radiation treatments ever since and is followed by oncology at [facility].  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  She is planning to have a PET scan on Monday for further evaluation of metastatic lesions.  She did have recent right ovarian cyst with significant pain and ended up having her right ovary removed.  She is on chronic steroids and is currently on Decadron 2 mg twice daily.     She presented to the emergency room last night with left lower quadrant abdominal pain that started around 5 PM last night with radiation to her back and down the anterior portion of her left lower extremity.  Denies any recent fever, chills, difficulty urinating, urinary frequency, difficulty controlling bowels or unsteady gait.  She attempted to take her home hydromorphone 0.5 mg with no relief despite taking 2 to 3 pills.  She does  report having a vitamin infusion yesterday with higher doses of vitamin C and B vitamins.  She does take daily calcium and was recently given Zomig due to osteoporosis from chemotherapy.  She continues to complain of pain worse in the left hip area.      In the ER she had a transvaginal ultrasound that showed normal left ovary mild endometrial thickness and retroverted uterus, but was overall unremarkable.  Labs are unremarkable except for hemoglobin of 10.8 that dropped to 9.5 today and small amount of leukocytes in urine.    Hospital Course:  The patient is a 44 y.o. female who presented with pain exacerbation located in left lower quadrant and left back with radiation down left thigh.  She was admitted and underwent diagnostic work-up and consultation by oncology.  Her pelvic ultrasound was negative so we proceeded with x-ray of her hip and MRI spine.  The MRI of her lumbar spine did show L2 and L3 metastatic lesions with compression fracture of the L3.  Was kyphoplasty cement present as well and she did not have any foraminal encroachment or stenosis.  The x-ray of her left hip did not show any acute fracture or lesion.  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  Her pain was controlled and she had improvement with oral Norco added to her chronic oral Dilaudid regimen.  She requested to have GI consult so she could discuss arrangement of possible colonoscopy.  She is also scheduled to have a PET scan tomorrow at [facility] and would like to be discharged today.  GI consult has been placed and she can be discharged after their evaluation.  See below for imaging results.    Exam Today:  Constitutional: She appears cachectic. No distress.   HENT:   Nose: Nose normal.   Mouth/Throat: Oropharynx is clear and moist.   Eyes: Conjunctivae are normal.   Cardiovascular: Normal  rate and regular rhythm.   No murmur heard.  Pulmonary/Chest: Effort normal. She has no wheezes.   Abdominal: Soft. Bowel sounds are normal. There is tenderness (suprapubic).   Musculoskeletal: She exhibits no edema.   Good ROM of left hip today with no more tenderness/pain   Neurological: She is alert.   Skin: Skin is warm and dry. She is not diaphoretic.     Results:  Pelvic Ultrasound  Unremarkable exam.    XR L Hip  Negative.    MRI Lumbar Spine  Metastatic lesions within the L2 and L3 vertebral bodies  with a pathologic compression fracture of L3 as described more detail  above. There is no significant encroachment on the spinal canal or  neural foramina at any level within the lumbar spine.    CT Abdomen/Pelvis  1. Development of small pleural effusions with an interval increase in  suspected basilar pulmonary metastatic disease.  2. Stable tiny liver lesion, likely cyst.  3. Mild constipation without obstruction.  4. Interval decrease in size of a circumscribed perirectal fluid  collection with current and prior dimensions as above.     Procedures Performed:         Consults:   Consults     Date and Time Order Name Status Description    6/13/2019 1140 Inpatient Gastroenterology Consult      6/12/2019 1231 Hematology & Oncology Inpatient Consult Completed     6/12/2019 0139 [facility] (on-call MD unless specified) Completed            Discharge Disposition: Home      Discharge Medications:     Discharge Medications      New Medications      Instructions Start Date   HYDROcodone-acetaminophen 5-325 MG per tablet  Commonly known as:  NORCO   1 tablet, Oral, Every 6 Hours PRN         Continue These Medications      Instructions Start Date   BROCCOLI EXTRACT PO   1 tablet, Oral, Daily      Calcium Citrate 150 MG capsule   1 capsule, Oral, Daily      CAYENNE PEPPER PO   1 tablet, Oral, Daily      CBD oral oil  Commonly known as:  cannabidiol   Oral, 2 Times Daily, Takes 0.5 mg by mouth every morning and evening.       dexamethasone 4 MG tablet  Commonly known as:  DECADRON   4 mg, Oral, Takes 1 tab twice daily on days 2 and 3 of chemotherapy as directed      docusate sodium 100 MG capsule  Commonly known as:  COLACE   100 mg, Oral, 2 Times Daily      fexofenadine 180 MG tablet  Commonly known as:  ALLEGRA   180 mg, Oral, Daily      GREEN TEA EXTRACT PO   1 tablet, Oral, Daily      HYDROmorphone 2 MG tablet  Commonly known as:  DILAUDID   2 mg, Oral, Every 4 Hours PRN, Takes 1-2 tabs as needed for pain      ibuprofen 600 MG tablet  Commonly known as:  ADVIL,MOTRIN   600 mg, Oral, Daily PRN, This is an old prescription she does not use often, but still has at home.      ibuprofen 200 MG tablet  Commonly known as:  ADVIL,MOTRIN   400 mg, Oral, 2 Times Daily PRN      lidocaine 5 %  Commonly known as:  LIDODERM   1 patch, Transdermal, Every 24 Hours, Remove & Discard patch within 12 hours or as directed by MD      LORazepam 0.5 MG tablet  Commonly known as:  ATIVAN   0.5 mg, Oral, Every 4 Hours PRN, Reports using once daily      mirtazapine 15 MG tablet  Commonly known as:  REMERON   15 mg, Oral, Nightly      multiple vitamin 10 mL in dextrose 5 % 1,000 mL   Intravenous, Weekly      norethindrone 0.35 MG tablet  Commonly known as:  MICRONOR   1 tablet, Oral, Every Evening      ondansetron 8 MG tablet  Commonly known as:  ZOFRAN   Oral, Every 8 Hours PRN      pantoprazole 40 MG EC tablet  Commonly known as:  PROTONIX   40 mg, Oral, As Needed, Only takes after chemo      PREBIOTIC PRODUCT PO   1 tablet, Oral, Daily      PROBIOTIC PRODUCT PO   1 Scoop, Oral, Daily      prochlorperazine 10 MG tablet  Commonly known as:  COMPAZINE   10 mg, Oral, Every 6 Hours PRN      sennosides-docusate sodium 8.6-50 MG tablet  Commonly known as:  SENOKOT-S   1 tablet, Oral, Every Evening      TUCKS HEMORRHOIDAL RE   1 application, Rectal, Every Other Day      TURMERIC PO   1 tablet, Oral, Daily      ZOMETA 4 MG/100ML solution infusion (premix)  Generic  drug:  zoledronic acid   4 mg, Intravenous, Every 3 Months             Discharge Diet:   Diet Instructions     Advance Diet As Tolerated            Activity at Discharge:   Activity Instructions     Activity as Tolerated            Follow-up Appointments:  Follow-up Information     Jase Warner Follow up.    Specialty:  Internal Medicine  Contact information:  [address] [place] [address] [id number] [phone]             [facility] Follow up.    Contact information:  [address] [place] [id number] [phone]           Salo Voss MD Follow up.    Specialties:  Gastroenterology, Internal Medicine  Contact information:  [address] [place] [address] [phone]                   Test Results Pending at Discharge:       Hamzah Mason MD  06/13/19  5:05 PM    Time Spent on Discharge Activities: >30 minutes

## 2019-06-13 NOTE — PROGRESS NOTES
Discharge Planning Assessment  [facility]     Patient Name: Vita Ren  MRN: [id number]  Today's Date: 6/13/2019    Admit Date: 6/11/2019    Discharge Needs Assessment     Row Name 06/13/19 1346       Living Environment    Lives With  spouse;child(emanuel), dependent    Name(s) of Who Lives With Patient  spouse Bridger aLtham [phone]    Current Living Arrangements  home/apartment/condo    Primary Care Provided by  self    Provides Primary Care For  child(emanuel)    Family Caregiver if Needed  spouse    Family Caregiver Names  spouse Bridger Latham [phone]    Quality of Family Relationships  helpful;involved;supportive    Able to Return to Prior Arrangements  yes       Resource/Environmental Concerns    Resource/Environmental Concerns  home accessibility    Home Accessibility Concerns  stairs to enter home 3 steps with no handrails to enter her 2 story home     Transportation Concerns  car, none       Transition Planning    Patient/Family Anticipates Transition to  home    Patient/Family Anticipated Services at Transition  none    Transportation Anticipated  family or friend will provide       Discharge Needs Assessment    Concerns to be Addressed  denies needs/concerns at this time    Equipment Currently Used at Home  oxygen;cane, straight;walker, rolling    Anticipated Changes Related to Illness  none    Equipment Needed After Discharge  none    Offered/Gave Vendor List  yes    Current Discharge Risk  physical impairment        Discharge Plan     Row Name 06/13/19 1344       Plan    Plan  Plans home; denies needs.    Patient/Family in Agreement with Plan  yes    Plan Comments  Met with the patient and her family members at bedside.  Her family members remained present in the room with her permission.  Explained role of CCP, verified facesheet and discussed discharge planning needs.  The patient plans to return home upon d/c with assistance from her spouse and other family and friends.  The patient resides at  home with her spouse Bridger Latham [phone].  The patient has a cane, walker with wheels, home O2 from [employer] and has 3 steps with no handrails to enter her 2 story home however she has everything that she needs on the main level in the home. The patient's primary care is being provided by her [facility] Oncologist Jase Warner.  The patient's pharmacy is [facility] on [facility] and she denies any trouble remembering to take her medication or with affording her medication.  The patient is unsure who she has used in the past for HH, denies any SNF history and was provided with a HH/SNF list.  The patient denies any POA documents and was informed that she could complete a living will or advanced directives while at [facility] if she is interested.  The patient states that her spouse transports her to her appointments and will transport her home upon d/c.  The patient currently denies any d/c needs and CCP will follow to assist with any needs that may arise upon d/c.  Inida Ureña, [facility]        Destination      No service coordination in this encounter.      Durable Medical Equipment      No service coordination in this encounter.      Dialysis/Infusion      No service coordination in this encounter.      Home Medical Care      No service coordination in this encounter.      Therapy      No service coordination in this encounter.      Community Resources      No service coordination in this encounter.          Demographic Summary     Row Name 06/13/19 1349       General Information    Admission Type  inpatient    Arrived From  home    Referral Source  admission list    Reason for Consult  discharge planning    Preferred Language  English     Used During This Interaction  no        Functional Status     Row Name 06/13/19 9699       Functional Status    Usual Activity Tolerance  good    Current Activity Tolerance  moderate       Functional Status, IADL    Medications  assistive equipment    Meal  Preparation  assistive equipment    Housekeeping  assistive equipment    Laundry  assistive equipment    Shopping  assistive equipment       Mental Status    General Appearance WDL  WDL       Mental Status Summary    Recent Changes in Mental Status/Cognitive Functioning  no changes        Psychosocial    No documentation.       Abuse/Neglect    No documentation.       Legal    No documentation.       Substance Abuse    No documentation.       Patient Forms     Row Name 06/13/19 8440       Patient Forms    Provider Choice List  Delivered    Delivered to  Patient    Method of delivery  In person            ELSIE Herrera

## 2019-06-14 ENCOUNTER — READMISSION MANAGEMENT (OUTPATIENT)
Dept: CALL CENTER | Facility: HOSPITAL | Age: 44
End: 2019-06-14

## 2019-06-14 NOTE — OUTREACH NOTE
Prep Survey      Responses   Facility patient discharged from?  [place]   Is patient eligible?  Yes   Discharge diagnosis  Constipation, Intractable abdominal pain Colon cancer metastasized to multiple sites including liver, lung, iliac    Does the patient have one of the following disease processes/diagnoses(primary or secondary)?  Other   Does the patient have Home health ordered?  No   Is there a DME ordered?  No   Prep survey completed?  Yes          Breana Becerra RN

## 2019-06-16 ENCOUNTER — APPOINTMENT (OUTPATIENT)
Dept: CARDIOLOGY | Facility: HOSPITAL | Age: 44
End: 2019-06-16

## 2019-06-16 ENCOUNTER — APPOINTMENT (OUTPATIENT)
Dept: GENERAL RADIOLOGY | Facility: HOSPITAL | Age: 44
End: 2019-06-16

## 2019-06-16 ENCOUNTER — READMISSION MANAGEMENT (OUTPATIENT)
Dept: CALL CENTER | Facility: HOSPITAL | Age: 44
End: 2019-06-16

## 2019-06-16 ENCOUNTER — HOSPITAL ENCOUNTER (INPATIENT)
Facility: HOSPITAL | Age: 44
LOS: 5 days | Discharge: HOME | End: 2019-06-21
Attending: EMERGENCY MEDICINE | Admitting: INTERNAL MEDICINE

## 2019-06-16 ENCOUNTER — APPOINTMENT (OUTPATIENT)
Dept: CT IMAGING | Facility: HOSPITAL | Age: 44
End: 2019-06-16

## 2019-06-16 DIAGNOSIS — C18.7 MALIGNANT NEOPLASM OF SIGMOID COLON (HCC): ICD-10-CM

## 2019-06-16 DIAGNOSIS — C19 COLORECTAL CANCER, STAGE IV (HCC): Primary | ICD-10-CM

## 2019-06-16 DIAGNOSIS — S32.039A CLOSED FRACTURE OF THIRD LUMBAR VERTEBRA, UNSPECIFIED FRACTURE MORPHOLOGY, INITIAL ENCOUNTER (HCC): ICD-10-CM

## 2019-06-16 DIAGNOSIS — R06.00 DYSPNEA, UNSPECIFIED TYPE: ICD-10-CM

## 2019-06-16 DIAGNOSIS — R52 UNCONTROLLED PAIN: ICD-10-CM

## 2019-06-16 PROBLEM — J45.31 MILD PERSISTENT ASTHMA WITH ACUTE EXACERBATION: Status: ACTIVE | Noted: 2019-06-16

## 2019-06-16 PROBLEM — G89.3 PAIN OF METASTATIC MALIGNANCY: Status: ACTIVE | Noted: 2019-06-16

## 2019-06-16 PROBLEM — M54.9 INTRACTABLE BACK PAIN: Status: ACTIVE | Noted: 2019-06-16

## 2019-06-16 LAB
ALBUMIN SERPL-MCNC: 4 G/DL (ref 3.5–5.2)
ALBUMIN/GLOB SERPL: 1.3 G/DL
ALP SERPL-CCNC: 79 U/L (ref 39–117)
ALT SERPL W P-5'-P-CCNC: 6 U/L (ref 1–33)
ANION GAP SERPL CALCULATED.3IONS-SCNC: 12.9 MMOL/L
AST SERPL-CCNC: 13 U/L (ref 1–32)
BASOPHILS # BLD AUTO: 0.02 10*3/MM3 (ref 0–0.2)
BASOPHILS NFR BLD AUTO: 0.2 % (ref 0–1.5)
BH CV LOW VAS LEFT SAPHENOFEMORAL JUNCTION SPONT: 1
BH CV LOWER VASCULAR LEFT COMMON FEMORAL AUGMENT: NORMAL
BH CV LOWER VASCULAR LEFT COMMON FEMORAL COMPETENT: NORMAL
BH CV LOWER VASCULAR LEFT COMMON FEMORAL COMPRESS: NORMAL
BH CV LOWER VASCULAR LEFT COMMON FEMORAL PHASIC: NORMAL
BH CV LOWER VASCULAR LEFT COMMON FEMORAL SPONT: NORMAL
BH CV LOWER VASCULAR LEFT DISTAL FEMORAL COMPRESS: NORMAL
BH CV LOWER VASCULAR LEFT GASTRONEMIUS COMPRESS: NORMAL
BH CV LOWER VASCULAR LEFT GREATER SAPH AK COMPRESS: NORMAL
BH CV LOWER VASCULAR LEFT GREATER SAPH BK COMPRESS: NORMAL
BH CV LOWER VASCULAR LEFT MID FEMORAL AUGMENT: NORMAL
BH CV LOWER VASCULAR LEFT MID FEMORAL COMPETENT: NORMAL
BH CV LOWER VASCULAR LEFT MID FEMORAL COMPRESS: NORMAL
BH CV LOWER VASCULAR LEFT MID FEMORAL PHASIC: NORMAL
BH CV LOWER VASCULAR LEFT MID FEMORAL SPONT: NORMAL
BH CV LOWER VASCULAR LEFT PERONEAL COMPRESS: NORMAL
BH CV LOWER VASCULAR LEFT POPLITEAL AUGMENT: NORMAL
BH CV LOWER VASCULAR LEFT POPLITEAL COMPETENT: NORMAL
BH CV LOWER VASCULAR LEFT POPLITEAL COMPRESS: NORMAL
BH CV LOWER VASCULAR LEFT POPLITEAL PHASIC: NORMAL
BH CV LOWER VASCULAR LEFT POPLITEAL SPONT: NORMAL
BH CV LOWER VASCULAR LEFT POSTERIOR TIBIAL COMPRESS: NORMAL
BH CV LOWER VASCULAR LEFT PROXIMAL FEMORAL COMPRESS: NORMAL
BH CV LOWER VASCULAR LEFT SAPHENOFEMORAL JUNCTION AUGMENT: NORMAL
BH CV LOWER VASCULAR LEFT SAPHENOFEMORAL JUNCTION COMPETENT: NORMAL
BH CV LOWER VASCULAR LEFT SAPHENOFEMORAL JUNCTION COMPRESS: NORMAL
BH CV LOWER VASCULAR LEFT SAPHENOFEMORAL JUNCTION PHASIC: NORMAL
BH CV LOWER VASCULAR LEFT SAPHENOFEMORAL JUNCTION SPONT: NORMAL
BH CV LOWER VASCULAR RIGHT COMMON FEMORAL AUGMENT: NORMAL
BH CV LOWER VASCULAR RIGHT COMMON FEMORAL COMPETENT: NORMAL
BH CV LOWER VASCULAR RIGHT COMMON FEMORAL COMPRESS: NORMAL
BH CV LOWER VASCULAR RIGHT COMMON FEMORAL PHASIC: NORMAL
BH CV LOWER VASCULAR RIGHT COMMON FEMORAL SPONT: NORMAL
BILIRUB SERPL-MCNC: 0.3 MG/DL (ref 0.2–1.2)
BUN BLD-MCNC: 10 MG/DL (ref 6–20)
BUN/CREAT SERPL: 16.1 (ref 7–25)
CALCIUM SPEC-SCNC: 9.2 MG/DL (ref 8.6–10.5)
CHLORIDE SERPL-SCNC: 108 MMOL/L (ref 98–107)
CO2 SERPL-SCNC: 22.1 MMOL/L (ref 22–29)
CREAT BLD-MCNC: 0.62 MG/DL (ref 0.57–1)
DEPRECATED RDW RBC AUTO: 81.6 FL (ref 37–54)
EOSINOPHIL # BLD AUTO: 0.31 10*3/MM3 (ref 0–0.4)
EOSINOPHIL NFR BLD AUTO: 3.5 % (ref 0.3–6.2)
ERYTHROCYTE [DISTWIDTH] IN BLOOD BY AUTOMATED COUNT: 25.3 % (ref 12.3–15.4)
GFR SERPL CREATININE-BSD FRML MDRD: 106 ML/MIN/1.73
GLOBULIN UR ELPH-MCNC: 3 GM/DL
GLUCOSE BLD-MCNC: 93 MG/DL (ref 65–99)
HCG SERPL QL: NEGATIVE
HCT VFR BLD AUTO: 36.3 % (ref 34–46.6)
HGB BLD-MCNC: 11 G/DL (ref 12–15.9)
IMM GRANULOCYTES # BLD AUTO: 0.03 10*3/MM3 (ref 0–0.05)
IMM GRANULOCYTES NFR BLD AUTO: 0.3 % (ref 0–0.5)
LYMPHOCYTES # BLD AUTO: 0.95 10*3/MM3 (ref 0.7–3.1)
LYMPHOCYTES NFR BLD AUTO: 10.6 % (ref 19.6–45.3)
MCH RBC QN AUTO: 27.7 PG (ref 26.6–33)
MCHC RBC AUTO-ENTMCNC: 30.3 G/DL (ref 31.5–35.7)
MCV RBC AUTO: 91.4 FL (ref 79–97)
MONOCYTES # BLD AUTO: 0.68 10*3/MM3 (ref 0.1–0.9)
MONOCYTES NFR BLD AUTO: 7.6 % (ref 5–12)
NEUTROPHILS # BLD AUTO: 6.98 10*3/MM3 (ref 1.7–7)
NEUTROPHILS NFR BLD AUTO: 77.8 % (ref 42.7–76)
NRBC BLD AUTO-RTO: 0 /100 WBC (ref 0–0.2)
NT-PROBNP SERPL-MCNC: 83.5 PG/ML (ref 5–450)
PLATELET # BLD AUTO: 214 10*3/MM3 (ref 140–450)
PMV BLD AUTO: 8.8 FL (ref 6–12)
POTASSIUM BLD-SCNC: 3.9 MMOL/L (ref 3.5–5.2)
PROT SERPL-MCNC: 7 G/DL (ref 6–8.5)
RBC # BLD AUTO: 3.97 10*6/MM3 (ref 3.77–5.28)
SODIUM BLD-SCNC: 143 MMOL/L (ref 136–145)
TROPONIN T SERPL-MCNC: <0.01 NG/ML (ref 0–0.03)
WBC NRBC COR # BLD: 8.97 10*3/MM3 (ref 3.4–10.8)

## 2019-06-16 PROCEDURE — 25010000002 HYDROMORPHONE PER 4 MG: Performed by: INTERNAL MEDICINE

## 2019-06-16 PROCEDURE — 25010000002 ONDANSETRON PER 1 MG: Performed by: EMERGENCY MEDICINE

## 2019-06-16 PROCEDURE — 94640 AIRWAY INHALATION TREATMENT: CPT

## 2019-06-16 PROCEDURE — 93971 EXTREMITY STUDY: CPT

## 2019-06-16 PROCEDURE — 25010000002 ENOXAPARIN PER 10 MG: Performed by: INTERNAL MEDICINE

## 2019-06-16 PROCEDURE — 84703 CHORIONIC GONADOTROPIN ASSAY: CPT | Performed by: EMERGENCY MEDICINE

## 2019-06-16 PROCEDURE — 25010000002 KETOROLAC TROMETHAMINE PER 15 MG: Performed by: INTERNAL MEDICINE

## 2019-06-16 PROCEDURE — 99284 EMERGENCY DEPT VISIT MOD MDM: CPT

## 2019-06-16 PROCEDURE — 0 IOPAMIDOL PER 1 ML: Performed by: EMERGENCY MEDICINE

## 2019-06-16 PROCEDURE — 93005 ELECTROCARDIOGRAM TRACING: CPT | Performed by: EMERGENCY MEDICINE

## 2019-06-16 PROCEDURE — 71275 CT ANGIOGRAPHY CHEST: CPT

## 2019-06-16 PROCEDURE — 25010000002 HYDROMORPHONE 1 MG/ML SOLUTION: Performed by: EMERGENCY MEDICINE

## 2019-06-16 PROCEDURE — 83880 ASSAY OF NATRIURETIC PEPTIDE: CPT | Performed by: EMERGENCY MEDICINE

## 2019-06-16 PROCEDURE — 85025 COMPLETE CBC W/AUTO DIFF WBC: CPT | Performed by: EMERGENCY MEDICINE

## 2019-06-16 PROCEDURE — 93010 ELECTROCARDIOGRAM REPORT: CPT | Performed by: INTERNAL MEDICINE

## 2019-06-16 PROCEDURE — 71045 X-RAY EXAM CHEST 1 VIEW: CPT

## 2019-06-16 PROCEDURE — 84484 ASSAY OF TROPONIN QUANT: CPT | Performed by: EMERGENCY MEDICINE

## 2019-06-16 PROCEDURE — 80053 COMPREHEN METABOLIC PANEL: CPT | Performed by: EMERGENCY MEDICINE

## 2019-06-16 RX ORDER — PANTOPRAZOLE SODIUM 40 MG/1
40 TABLET, DELAYED RELEASE ORAL AS NEEDED
Status: DISCONTINUED | OUTPATIENT
Start: 2019-06-16 | End: 2019-06-17

## 2019-06-16 RX ORDER — HYDROMORPHONE HYDROCHLORIDE 2 MG/1
2 TABLET ORAL EVERY 4 HOURS PRN
Status: DISCONTINUED | OUTPATIENT
Start: 2019-06-16 | End: 2019-06-17

## 2019-06-16 RX ORDER — KETOROLAC TROMETHAMINE 30 MG/ML
30 INJECTION, SOLUTION INTRAMUSCULAR; INTRAVENOUS EVERY 6 HOURS PRN
Status: DISCONTINUED | OUTPATIENT
Start: 2019-06-16 | End: 2019-06-17

## 2019-06-16 RX ORDER — ACETAMINOPHEN AND CODEINE PHOSPHATE 120; 12 MG/5ML; MG/5ML
0.35 SOLUTION ORAL DAILY
Status: DISCONTINUED | OUTPATIENT
Start: 2019-06-16 | End: 2019-06-17

## 2019-06-16 RX ORDER — ONDANSETRON 4 MG/1
4 TABLET, FILM COATED ORAL EVERY 8 HOURS PRN
Status: DISCONTINUED | OUTPATIENT
Start: 2019-06-16 | End: 2019-06-22 | Stop reason: HOSPADM

## 2019-06-16 RX ORDER — SODIUM CHLORIDE 9 MG/ML
30 INJECTION, SOLUTION INTRAVENOUS CONTINUOUS
Status: DISCONTINUED | OUTPATIENT
Start: 2019-06-16 | End: 2019-06-16

## 2019-06-16 RX ORDER — SODIUM CHLORIDE 0.9 % (FLUSH) 0.9 %
3-10 SYRINGE (ML) INJECTION AS NEEDED
Status: DISCONTINUED | OUTPATIENT
Start: 2019-06-16 | End: 2019-06-22 | Stop reason: HOSPADM

## 2019-06-16 RX ORDER — LORAZEPAM 0.5 MG/1
0.5 TABLET ORAL EVERY 4 HOURS PRN
Status: DISCONTINUED | OUTPATIENT
Start: 2019-06-16 | End: 2019-06-17

## 2019-06-16 RX ORDER — CETIRIZINE HYDROCHLORIDE 10 MG/1
10 TABLET ORAL DAILY
Status: DISCONTINUED | OUTPATIENT
Start: 2019-06-16 | End: 2019-06-17

## 2019-06-16 RX ORDER — ACETAMINOPHEN 325 MG/1
650 TABLET ORAL EVERY 4 HOURS PRN
Status: DISCONTINUED | OUTPATIENT
Start: 2019-06-16 | End: 2019-06-22 | Stop reason: HOSPADM

## 2019-06-16 RX ORDER — HYDROMORPHONE HYDROCHLORIDE 1 MG/ML
0.5 INJECTION, SOLUTION INTRAMUSCULAR; INTRAVENOUS; SUBCUTANEOUS
Status: DISCONTINUED | OUTPATIENT
Start: 2019-06-16 | End: 2019-06-17

## 2019-06-16 RX ORDER — SODIUM CHLORIDE 0.9 % (FLUSH) 0.9 %
3 SYRINGE (ML) INJECTION EVERY 12 HOURS SCHEDULED
Status: DISCONTINUED | OUTPATIENT
Start: 2019-06-16 | End: 2019-06-22 | Stop reason: HOSPADM

## 2019-06-16 RX ORDER — HYDROMORPHONE HYDROCHLORIDE 1 MG/ML
1 INJECTION, SOLUTION INTRAMUSCULAR; INTRAVENOUS; SUBCUTANEOUS EVERY 4 HOURS PRN
Status: DISCONTINUED | OUTPATIENT
Start: 2019-06-16 | End: 2019-06-17

## 2019-06-16 RX ORDER — MIRTAZAPINE 15 MG/1
15 TABLET, FILM COATED ORAL NIGHTLY
Status: DISCONTINUED | OUTPATIENT
Start: 2019-06-16 | End: 2019-06-22 | Stop reason: HOSPADM

## 2019-06-16 RX ORDER — MONTELUKAST SODIUM 4 MG/1
4 TABLET, CHEWABLE ORAL NIGHTLY
Status: DISCONTINUED | OUTPATIENT
Start: 2019-06-16 | End: 2019-06-17

## 2019-06-16 RX ORDER — SODIUM CHLORIDE 0.9 % (FLUSH) 0.9 %
10 SYRINGE (ML) INJECTION AS NEEDED
Status: DISCONTINUED | OUTPATIENT
Start: 2019-06-16 | End: 2019-06-22 | Stop reason: HOSPADM

## 2019-06-16 RX ORDER — L.ACID,PARA/B.BIFIDUM/S.THERM 8B CELL
1 CAPSULE ORAL DAILY
Status: DISCONTINUED | OUTPATIENT
Start: 2019-06-16 | End: 2019-06-22 | Stop reason: HOSPADM

## 2019-06-16 RX ORDER — HYDROCODONE BITARTRATE AND ACETAMINOPHEN 7.5; 325 MG/1; MG/1
1 TABLET ORAL EVERY 4 HOURS PRN
Status: DISCONTINUED | OUTPATIENT
Start: 2019-06-16 | End: 2019-06-17

## 2019-06-16 RX ORDER — SENNA AND DOCUSATE SODIUM 50; 8.6 MG/1; MG/1
1 TABLET, FILM COATED ORAL EVERY EVENING
Status: DISCONTINUED | OUTPATIENT
Start: 2019-06-16 | End: 2019-06-19

## 2019-06-16 RX ORDER — LUBIPROSTONE 8 UG/1
8 CAPSULE ORAL 2 TIMES DAILY WITH MEALS
Status: DISCONTINUED | OUTPATIENT
Start: 2019-06-16 | End: 2019-06-22 | Stop reason: HOSPADM

## 2019-06-16 RX ORDER — SODIUM CHLORIDE 9 MG/ML
50 INJECTION, SOLUTION INTRAVENOUS CONTINUOUS
Status: DISCONTINUED | OUTPATIENT
Start: 2019-06-16 | End: 2019-06-22 | Stop reason: HOSPADM

## 2019-06-16 RX ORDER — LIDOCAINE 50 MG/G
1 PATCH TOPICAL EVERY 24 HOURS
Status: DISCONTINUED | OUTPATIENT
Start: 2019-06-16 | End: 2019-06-22 | Stop reason: HOSPADM

## 2019-06-16 RX ORDER — DOCUSATE SODIUM 100 MG/1
100 CAPSULE, LIQUID FILLED ORAL 2 TIMES DAILY
Status: DISCONTINUED | OUTPATIENT
Start: 2019-06-16 | End: 2019-06-22 | Stop reason: HOSPADM

## 2019-06-16 RX ORDER — ONDANSETRON 2 MG/ML
4 INJECTION INTRAMUSCULAR; INTRAVENOUS ONCE
Status: COMPLETED | OUTPATIENT
Start: 2019-06-16 | End: 2019-06-16

## 2019-06-16 RX ORDER — BUDESONIDE 0.5 MG/2ML
0.5 INHALANT ORAL
Status: DISCONTINUED | OUTPATIENT
Start: 2019-06-16 | End: 2019-06-22 | Stop reason: HOSPADM

## 2019-06-16 RX ADMIN — DOCUSATE SODIUM 100 MG: 100 CAPSULE, LIQUID FILLED ORAL at 21:35

## 2019-06-16 RX ADMIN — MIRTAZAPINE 15 MG: 15 TABLET, FILM COATED ORAL at 21:19

## 2019-06-16 RX ADMIN — HYDROMORPHONE HYDROCHLORIDE 1 MG: 1 INJECTION, SOLUTION INTRAMUSCULAR; INTRAVENOUS; SUBCUTANEOUS at 21:38

## 2019-06-16 RX ADMIN — IOPAMIDOL 95 ML: 755 INJECTION, SOLUTION INTRAVENOUS at 12:19

## 2019-06-16 RX ADMIN — HYDROMORPHONE HYDROCHLORIDE 1 MG: 1 INJECTION, SOLUTION INTRAMUSCULAR; INTRAVENOUS; SUBCUTANEOUS at 13:35

## 2019-06-16 RX ADMIN — LORAZEPAM 0.5 MG: 0.5 TABLET ORAL at 21:45

## 2019-06-16 RX ADMIN — HYDROMORPHONE HYDROCHLORIDE 0.5 MG: 1 INJECTION, SOLUTION INTRAMUSCULAR; INTRAVENOUS; SUBCUTANEOUS at 20:43

## 2019-06-16 RX ADMIN — KETOROLAC TROMETHAMINE 30 MG: 30 INJECTION, SOLUTION INTRAMUSCULAR at 19:56

## 2019-06-16 RX ADMIN — ENOXAPARIN SODIUM 40 MG: 40 INJECTION SUBCUTANEOUS at 17:30

## 2019-06-16 RX ADMIN — IPRATROPIUM BROMIDE 0.5 MG: 0.5 SOLUTION RESPIRATORY (INHALATION) at 21:59

## 2019-06-16 RX ADMIN — HYDROMORPHONE HYDROCHLORIDE 1 MG: 1 INJECTION, SOLUTION INTRAMUSCULAR; INTRAVENOUS; SUBCUTANEOUS at 11:20

## 2019-06-16 RX ADMIN — SODIUM CHLORIDE 30 ML/HR: 9 INJECTION, SOLUTION INTRAVENOUS at 13:34

## 2019-06-16 RX ADMIN — LUBIPROSTONE 8 MCG: 8 CAPSULE, GELATIN COATED ORAL at 21:18

## 2019-06-16 RX ADMIN — BUDESONIDE 0.5 MG: 0.5 INHALANT RESPIRATORY (INHALATION) at 21:59

## 2019-06-16 RX ADMIN — SENNOSIDES,DOCUSATE SODIUM 1 TABLET: 50; 8.6 TABLET, FILM COATED ORAL at 19:57

## 2019-06-16 RX ADMIN — ONDANSETRON 4 MG: 2 INJECTION INTRAMUSCULAR; INTRAVENOUS at 11:26

## 2019-06-16 RX ADMIN — HYDROMORPHONE HYDROCHLORIDE 0.5 MG: 1 INJECTION, SOLUTION INTRAMUSCULAR; INTRAVENOUS; SUBCUTANEOUS at 18:39

## 2019-06-16 RX ADMIN — SODIUM CHLORIDE, POTASSIUM CHLORIDE, SODIUM LACTATE AND CALCIUM CHLORIDE 1000 ML: 600; 310; 30; 20 INJECTION, SOLUTION INTRAVENOUS at 11:00

## 2019-06-16 RX ADMIN — SODIUM CHLORIDE, PRESERVATIVE FREE 3 ML: 5 INJECTION INTRAVENOUS at 21:50

## 2019-06-16 RX ADMIN — HYDROMORPHONE HYDROCHLORIDE 1 MG: 1 INJECTION, SOLUTION INTRAMUSCULAR; INTRAVENOUS; SUBCUTANEOUS at 11:00

## 2019-06-16 RX ADMIN — SODIUM CHLORIDE 75 ML/HR: 9 INJECTION, SOLUTION INTRAVENOUS at 17:29

## 2019-06-16 RX ADMIN — LIDOCAINE 1 PATCH: 50 PATCH TOPICAL at 21:19

## 2019-06-16 RX ADMIN — CETIRIZINE HYDROCHLORIDE 10 MG: 10 TABLET, FILM COATED ORAL at 21:19

## 2019-06-16 RX ADMIN — HYDROCODONE BITARTRATE AND ACETAMINOPHEN 1 TABLET: 7.5; 325 TABLET ORAL at 20:51

## 2019-06-16 NOTE — OUTREACH NOTE
Medical Week 1 Survey      Responses   Facility patient discharged from?  [place]   Does the patient have one of the following disease processes/diagnoses(primary or secondary)?  Other   Is there a successful TCM telephone encounter documented?  No   Week 1 attempt successful?  No   Revoke  Readmitted          Leeann Latham RN

## 2019-06-16 NOTE — PROGRESS NOTES
"Received referral from Dr. Seals to speak with pt concerning potential for consolidating oncologist with pain management under one hospital's care. Dr. Seals concerned about continuity of care and how that is often best for pt's treatment.  Pt currently sees [facility] oncologist, but prefers to be admitted to [facility] for any issues that require hospitalization (and also sees a [facility] GI MD with her on-going colo-rectal cancer).  Went to speak with pt concerning resources available to her, and what her next steps could be for pain management along with oncology treatments.   Entered room and introduced self to pt/explained role to pt and spouse (who is currently at pt's bedside). Verified information on facesheet is correct.   Spoke at length with pt and spouse about pt's choice of oncologist and hospitals as well as how consolidating them could benefit her and her convenience and continued treatment.   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  However, pt reports that she does not feel like [facility] Hospitalists do adequate job of work-ups when she is admitted to their hospital. \"When I had my anemia diagnosed, I pushed and pushed for more lab work to be done when admitted at [facility]. They told me that I was fine. I then came in to the ER here, and they immediately ran the lab work, recognized the anemia problem, admitted me, and I experienced excellent care. I feel like I get sub-par care each time I am admitted at [facility]. I try to keep every other specialist that I see in the [facility] system as much as possible for ease of information-sharing. I just do not know if I will have this good of a relationship with an oncologist if I switch, and I'd hate to leave the treatment of one I've established such a good relationship with. And [facility] is kind of in-between pain management teams, so they " "haven't been able to establish me with any particular group. I've gotten some referrals for external groups, but none that I can obtain appointments with yet. My oncologist says that the pain management aspect of the care is 'not in his wheelhouse,' and continues external pain management referrals. So then I have to continue coming into the hospital when my pain becomes uncontrollable. I do not want to live in hospitals. I want to spend the time that I have left with my children at home.\"  ER ROME reassured pt that all of this information was understandable, and encouraged her to stay with the MDs and hospitals whose care she felt comfortable utilizing. Offered to gather information while hospitalized about [facility] pain management. While discussing information about pain management, pt also requested information about the potential for transition from pain management into hospice care, which she has been considering more lately, \"because I am tired.\"  ER ROME assured pt that information would be provided to her before d/c concerning these issues. Note being left in hand-off for f/u concerning pain management referrals/potential for transition to [facility] for inpatient CCP.  No additional needs or questions identified at this time. Pt and spouse thanked CHELSEA PETER for time and assistance.  "

## 2019-06-16 NOTE — ED PROVIDER NOTES
EMERGENCY DEPARTMENT ENCOUNTER    Room Number:  13/13  Date seen:  6/16/2019  Time seen: 10:34 AM  PCP: Jase Warner  Historian: Pt      HPI:  Chief Complaint: Back pain  A complete HPI/ROS/PMH/PSH/SH/FH are unobtainable due to: n/a  Context: Vita Ren is a 44 y.o. female with a 2 She presents to the ED c/o uncontrolled, constant lower left sided back pain that has been ongoing for 5 days and significantly worsened last night. She states the pain radiates to her left upper leg. She states her pain is aggravated by any movement. She also c/o increased SOA. Pt denies fever, CP, SOA, abd pain, and N/V/D. Pt was admitted 6/12-6/13/19 for uncontrollable pain. Pt has been taking 2mg Dilaudid, 375mg Hydrocodone, and 600mg Ibuprofen form pain. Pt is followed by [facility] oncology.    Pain Location: left lower back   Radiation: radiates into upper left leg  Quality: pain  Intensity/Severity: severe  Duration: 5 days, worse last night   Onset quality: gradual  Timing: constant   Progression: worsening   Aggravating Factors: movement   Alleviating Factors: none  Previous Episodes: h/o colorectal cancer with metastases to the liver, lungs, and spine  Treatment before arrival: Pt has been taking 2mg Dilaudid, 375mg Hydrocodone, and 600mg Ibuprofen form pain  Associated Symptoms: SOA    PAST MEDICAL HISTORY  Active Ambulatory Problems     Diagnosis Date Noted   • Pelvic pain 03/29/2019   • Abscess of ovary 06/10/2011   • Adenocarcinoma of rectum (CMS/HCC) 05/20/2011   • Encounter for colonoscopy due to history of colon cancer 03/18/2014   • Cancer (CMS/HCC) 12/01/2016   • Encounter for screening for malignant neoplasm of colon 03/18/2014   • History of malignant neoplasm of rectum 03/27/2012   • Malignant neoplasm of sigmoid colon (CMS/HCC) 12/03/2015   • Secondary adenocarcinoma of liver (CMS/HCC) 04/03/2018   • 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   • Secondary malignant neoplasm of right lung  (CMS/HCC) 04/03/2018   • 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   • Colon cancer metastasized to multiple sites including liver, lung, iliac lymph nodes and bone (CMS/HCC) 03/29/2019   • Right upper quadrant pain 03/29/2019   • Anemia due to chemotherapy 03/29/2019   • Pelvic fluid collection 04/08/2019   • Intractable abdominal pain 06/12/2019   • Left hip pain 06/12/2019   • L3 vertebral fracture (CMS/HCC) 06/12/2019   • Constipation 06/13/2019     Resolved Ambulatory Problems     Diagnosis Date Noted   • No Resolved Ambulatory Problems     Past Medical History:   Diagnosis Date   • Cancer (CMS/HCC)    • Pneumothorax          PAST SURGICAL HISTORY  Past Surgical History:   Procedure Laterality Date   • CHEST TUBE INSERTION     • COLON RESECTION      COLORECTAL RESECTION         FAMILY HISTORY  Family History   Problem Relation Age of Onset   • Cancer Neg Hx          SOCIAL HISTORY  Social History     Socioeconomic History   • Marital status:      Spouse name: Not on file   • Number of children: Not on file   • Years of education: Not on file   • Highest education level: Not on file   Tobacco Use   • Smoking status: Never Smoker   • Smokeless tobacco: Never Used   Substance and Sexual Activity   • Alcohol use: No   • Drug use: No   • Sexual activity: Defer         ALLERGIES  Patient has no known allergies.        REVIEW OF SYSTEMS  Review of Systems   Constitutional: Negative for fever.   HENT: Negative for sore throat.    Respiratory: Positive for shortness of breath.    Cardiovascular: Negative for chest pain.   Gastrointestinal: Negative for abdominal pain, diarrhea, nausea and vomiting.   Endocrine: Negative for polyuria.   Genitourinary: Negative for dysuria.   Musculoskeletal: Positive for back pain (left lower pain radiates into left leg). Negative for neck pain.   Skin: Negative for rash.   Neurological: Negative for headaches.   All other systems reviewed  and are negative.           PHYSICAL EXAM  ED Triage Vitals   Temp Heart Rate Resp BP SpO2   06/16/19 1023 06/16/19 1023 06/16/19 1023 06/16/19 1029 06/16/19 1023   98.7 °F (37.1 °C) 93 20 (!) 162/101 99 %      Temp src Heart Rate Source Patient Position BP Location FiO2 (%)   06/16/19 1023 06/16/19 1023 06/16/19 1029 06/16/19 1029 --   Tympanic Monitor Lying Right arm          GENERAL: distressed secondary to pain   HENT: nares patent  EYES: no scleral icterus  CV: regular rhythm, regular rate, 2+ radial pulse   RESPIRATORY: normal effort  ABDOMEN: soft, nontender, scaphoid abdomen  MUSCULOSKELETAL: no deformity, diffuse lumbar tenderness, no focal tenderness to legs   NEURO: alert, HANSEN, FC  SKIN: warm, dry    Vital signs and nursing notes reviewed.          LAB RESULTS  Recent Results (from the past 24 hour(s))   Comprehensive Metabolic Panel    Collection Time: 06/16/19 11:02 AM   Result Value Ref Range    Glucose 93 65 - 99 mg/dL    BUN 10 6 - 20 mg/dL    Creatinine 0.62 0.57 - 1.00 mg/dL    Sodium 143 136 - 145 mmol/L    Potassium 3.9 3.5 - 5.2 mmol/L    Chloride 108 (H) 98 - 107 mmol/L    CO2 22.1 22.0 - 29.0 mmol/L    Calcium 9.2 8.6 - 10.5 mg/dL    Total Protein 7.0 6.0 - 8.5 g/dL    Albumin 4.00 3.50 - 5.20 g/dL    ALT (SGPT) 6 1 - 33 U/L    AST (SGOT) 13 1 - 32 U/L    Alkaline Phosphatase 79 39 - 117 U/L    Total Bilirubin 0.3 0.2 - 1.2 mg/dL    eGFR Non African Amer 106 >60 mL/min/1.73    Globulin 3.0 gm/dL    A/G Ratio 1.3 g/dL    BUN/Creatinine Ratio 16.1 7.0 - 25.0    Anion Gap 12.9 mmol/L   CBC Auto Differential    Collection Time: 06/16/19 11:02 AM   Result Value Ref Range    WBC 8.97 3.40 - 10.80 10*3/mm3    RBC 3.97 3.77 - 5.28 10*6/mm3    Hemoglobin 11.0 (L) 12.0 - 15.9 g/dL    Hematocrit 36.3 34.0 - 46.6 %    MCV 91.4 79.0 - 97.0 fL    MCH 27.7 26.6 - 33.0 pg    MCHC 30.3 (L) 31.5 - 35.7 g/dL    RDW 25.3 (H) 12.3 - 15.4 %    RDW-SD 81.6 (H) 37.0 - 54.0 fl    MPV 8.8 6.0 - 12.0 fL    Platelets 214  140 - 450 10*3/mm3    Neutrophil % 77.8 (H) 42.7 - 76.0 %    Lymphocyte % 10.6 (L) 19.6 - 45.3 %    Monocyte % 7.6 5.0 - 12.0 %    Eosinophil % 3.5 0.3 - 6.2 %    Basophil % 0.2 0.0 - 1.5 %    Immature Grans % 0.3 0.0 - 0.5 %    Neutrophils, Absolute 6.98 1.70 - 7.00 10*3/mm3    Lymphocytes, Absolute 0.95 0.70 - 3.10 10*3/mm3    Monocytes, Absolute 0.68 0.10 - 0.90 10*3/mm3    Eosinophils, Absolute 0.31 0.00 - 0.40 10*3/mm3    Basophils, Absolute 0.02 0.00 - 0.20 10*3/mm3    Immature Grans, Absolute 0.03 0.00 - 0.05 10*3/mm3    nRBC 0.0 0.0 - 0.2 /100 WBC   BNP    Collection Time: 06/16/19 11:02 AM   Result Value Ref Range    proBNP 83.5 5.0 - 450.0 pg/mL   Troponin    Collection Time: 06/16/19 11:02 AM   Result Value Ref Range    Troponin T <0.010 0.000 - 0.030 ng/mL   hCG, Serum, Qualitative    Collection Time: 06/16/19 11:02 AM   Result Value Ref Range    HCG Qualitative Negative Negative   Duplex Venous Lower Extremity - Left    Collection Time: 06/16/19 12:55 PM   Result Value Ref Range    Left Saphenofemoral Junction Spont 1     Right Common Femoral Spont Y     Right Common Femoral Phasic Y     Right Common Femoral Augment Y     Right Common Femoral Competent Y     Right Common Femoral Compress C     Left Common Femoral Spont Y     Left Common Femoral Phasic Y     Left Common Femoral Augment Y     Left Common Femoral Competent Y     Left Common Femoral Compress C     Left Saphenofemoral Junction Spont Y     Left Saphenofemoral Junction Phasic Y     Left Saphenofemoral Junction Augment Y     Left Saphenofemoral Junction Competent N     Left Saphenofemoral Junction Compress C     Left Proximal Femoral Compress C     Left Mid Femoral Spont Y     Left Mid Femoral Phasic Y     Left Mid Femoral Augment Y     Left Mid Femoral Competent Y     Left Mid Femoral Compress C     Left Distal Femoral Compress C     Left Popliteal Spont Y     Left Popliteal Phasic Y     Left Popliteal Augment Y     Left Popliteal Competent Y      Left Popliteal Compress C     Left Posterior Tibial Compress C     Left Peroneal Compress C     Left GastronemiusSoleal Compress C     Left Greater Saph AK Compress C     Left Greater Saph BK Compress C        Ordered the above labs and reviewed the results.        RADIOLOGY  CT Angiogram Chest With Contrast   1. No pulmonary embolus, aortic aneurysm or dissection.  2. Innumerable nodular areas of consolidation seen throughout both lungs  identical to 05/30/2019, no pleural effusion, acute airspace disease or  new nodule has developed.      XR Chest 1 View   Final Result   Similar to the outside CT examination there is a right-sided  pneumothorax, measuring approximately 5-7%. This does not appear to have  increased since previous CT. There is lower inspiratory effort on the  current examination compared to previous chest radiograph. This causing  crowding of the bronchovascular markings and accentuation of the chronic  parenchymal change which has been demonstrated on prior chest  radiographs dating to 2017. Mediport catheter is in stable position. No  pulmonary edema or pleural effusion identified. Cardiomediastinal  silhouette is stable. No other interval change has occurred.            PROCEDURES  Procedures        EKG:           EKG time: 11:43 AM  Rhythm/Rate: NSR 69  P waves and VT: normal  QRS, axis: normal   ST and T waves: T waves inversion in aVL     Interpreted Contemporaneously by me, independently viewed  changed compared to prior 8/9/18, T wave inversion is new               MEDICATIONS GIVEN IN ER  Medications   sodium chloride 0.9 % flush 10 mL (not administered)   sodium chloride 0.9 % infusion (30 mL/hr Intravenous New Bag 6/16/19 1334)   HYDROmorphone (DILAUDID) injection 1 mg (1 mg Intravenous Given 6/16/19 1100)   lactated ringers bolus 1,000 mL (0 mL Intravenous Stopped 6/16/19 1334)   HYDROmorphone (DILAUDID) injection 1 mg (1 mg Intravenous Given 6/16/19 1120)   ondansetron (ZOFRAN)  injection 4 mg (4 mg Intravenous Given 6/16/19 1126)   iopamidol (ISOVUE-370) 76 % injection 100 mL (95 mL Intravenous Given by Other 6/16/19 1219)   HYDROmorphone (DILAUDID) injection 1 mg (1 mg Intravenous Given 6/16/19 1335)                   PROGRESS AND CONSULTS   10:26 AM  EKG ordered for evaluation.     10:49 AM  Labs, Doppler LLE, CTA chest, and CXR ordered for evaluation. Dilaudid ordered for pain. IV fluids ordered.     11:18 AM  Dilaudid and Zofran ordered for pain and nausea.     1:05 PM  Dr. Gallegos, radiology, report CTA chest is acutely negative.     1:20 PM   Dilaudid ordered for pain.     1:59 PM  Rechecked pt who is resting in NAD. Informed pt Doppler LLE and CTA chest are negative for blood clots. Pt states she has been unable to control her pain at home.     2:18 PM  Consult placed to hem/onc.     2:42 PM  Discussed pt case with Dr. Moise, oncology. Pt is followed by [facility].         MEDICAL DECISION MAKING      MDM  Number of Diagnoses or Management Options  Colorectal cancer, stage IV (CMS/HCC):   Dyspnea, unspecified type:   Uncontrolled pain:   Diagnosis management comments: Patient presents with 2 issues.  First, she is complaining of left-sided flank pain that is rating down into her leg.  This is in her words same pain that she has been having since prior to her last admission.  However, given the leg pain, I ordered an ultrasound of her leg which shows no evidence of DVT.  She has no new symptoms that may be concerned about cauda equina syndrome.  Second, she also complains of dyspnea.  She is satting well on room air.  She does not appear to be very dyspneic.  However, she does seem quite anxious at times may be contributing to her dyspnea in addition to her known lung metastases.  No evidence of PE, effusion.  No evidence of cardiac pathology that is causing this.  Due to uncontrolled pain, patient will be admitted to the hospital.  I had to talk with the patient, her  as  well as my  given the patient continues to come to [facility] despite receiving her oncology care at [facility].  This does unfortunately disrupt continuity of care.  However, patient states that she has been unable to get pain management at [facility] due to reported logistical and organizational challenges at [facility].  Therefore, she would like to continue getting all of her care outside of her oncology care at [facility].  Per , the patient is going to be referred to pain management here at [facility] in addition to her gastroenterology referral.  Patient is also stating that she can is considering transitioning to hospice care.       Amount and/or Complexity of Data Reviewed  Clinical lab tests: ordered and reviewed (WBC-8.97  Hemoglobin-11.0  Troponin is <0.010  Doppler LLE negative )  Tests in the radiology section of CPT®: ordered and reviewed (CTA chest-acutely negative )  Tests in the medicine section of CPT®: ordered and reviewed (See ekg note)  Discussion of test results with the performing providers: yes (Dr. Gallegos, radiology)  Decide to obtain previous medical records or to obtain history from someone other than the patient: yes  Review and summarize past medical records: yes (Hospital course from admission 6/12-6/13/19  The patient is a 44 y.o. female who presented with pain exacerbation located in left lower quadrant and left back with radiation down left thigh.  She was admitted and underwent diagnostic work-up and consultation by oncology.  Her pelvic ultrasound was negative so we proceeded with x-ray of her hip and MRI spine.  The MRI of her lumbar spine did show L2 and L3 metastatic lesions with compression fracture of the L3.  Was kyphoplasty cement present as well and she did not have any foraminal encroachment or stenosis.  The x-ray of her left hip did not show any acute fracture or lesion.  CT of her abdomen and pelvis was obtained  and this showed suspected basilar pulmonary metastatic disease, mild constipation, and improvement in size of perirectal fluid collection.  Her pain was controlled and she had improvement with oral Norco added to her chronic oral Dilaudid regimen.  She requested to have GI consult so she could discuss arrangement of possible colonoscopy.  She is also scheduled to have a PET scan tomorrow at [facility] and would like to be discharged today.  GI consult has been placed and she can be discharged after their evaluation.  See below for imaging results.)  Discuss the patient with other providers: yes (Dr. Moise, radiology)               DIAGNOSIS  Final diagnoses:   Colorectal cancer, stage IV (CMS/HCC)   Dyspnea, unspecified type   Uncontrolled pain         DISPOSITION  Admit            Latest Documented Vital Signs:  As of 4:22 PM  BP- 111/53 HR- 88 Temp- 98.7 °F (37.1 °C) (Tympanic) O2 sat- 98%        --  Documentation assistance provided by luis e Chaudhry for Dr. Arnel MD.  Information recorded by the scribe was done at my direction and has been verified and validated by me.                 Vee Chaudhry  06/16/19 [id number]       Mart Seals II, MD  06/16/19 [id number]

## 2019-06-16 NOTE — ED NOTES
"This tech went in to obtain ECG as ordered.  Pt refused to allow this tech to do ECG.  \"I will not do this until I get some fucking pain medication!\" This tech advised pt she would only have to sit still to run test and that test is being done due to her SOA and leg pain.  Pt still refused.  MD and RN notified.       Xochilt Paredes N  06/16/19 1037       Xochilt Paredes N  06/16/19 1038    "

## 2019-06-16 NOTE — H&P
Name: Vita Ren ADMIT: 2019   : 1975  PCP: Jase Warner    MRN: [id number] LOS: 0 days   AGE/SEX: 44 y.o. female  ROOM: P393/1     Chief Complaint   Patient presents with   • Leg Pain     left leg pain starting x1 week ago.    • Shortness of Breath         Patient is a 44 y.o. woman who was just discharged from our service on 2019.  She was found at that time to have mets at L2 and L3.  She has continued to have pain; most of the pain is at the left flank and hip area.  The pain radiates around anteriorly and gets into the left leg.  It is constant and severe.  Left leg feels weak.  She uses a cane or walker to ambulate.  Her left flank and left leg pain are worse.  Walking is difficult.  She is getting insufficient relief with her pain medications.  No fever or chills.  Her abdominal pain is unchanged.  She has been eating and at times has been hungry which is an improvement for her.  She has chronic constipation.  Previously has had nausea.  No vomiting recently.  Chronic constipation.    She was not able to get the PET scan as scheduled on 2019 because of worsening pain.  She called her oncologist Dr. Warner and was told to go to the emergency room for the issue of pain management.  Regarding her shortness of breath, it is better.  She is wheezing.    Past Medical History:   Diagnosis Date   • Cancer (CMS/HCC)     METASTATIC STAGE 4 COLORECTAL TO LIVER AND LUNGS   • Pneumothorax     RIGHT LUNG       Past Surgical History:   Procedure Laterality Date   • CHEST TUBE INSERTION     • COLON RESECTION      COLORECTAL RESECTION       Medications Prior to Admission   Medication Sig Dispense Refill Last Dose   • BROCCOLI EXTRACT PO Take 1 tablet by mouth Daily.   6/15/2019 at Unknown time   • Calcium Citrate 150 MG capsule Take 1 capsule by mouth Daily.   6/15/2019 at Unknown time   • Capsicum, Cayenne, (CAYENNE PEPPER PO) Take 1 tablet by mouth Daily.   6/15/2019 at Unknown time   • CBD  (cannabidiol) oral oil Take  by mouth 2 (Two) Times a Day. Takes 0.5 mg by mouth every morning and evening.   6/15/2019 at Unknown time   • dexamethasone (DECADRON) 4 MG tablet Take 4 mg by mouth. Takes 1 tab twice daily on days 2 and 3 of chemotherapy as directed   6/15/2019 at Unknown time   • docusate sodium (COLACE) 100 MG capsule Take 100 mg by mouth 2 (Two) Times a Day.   6/16/2019 at Unknown time   • fexofenadine (ALLEGRA) 180 MG tablet Take 180 mg by mouth Daily.   6/15/2019 at Unknown time   • Green Tea, Lorena sinensis, (GREEN TEA EXTRACT PO) Take 1 tablet by mouth Daily.   6/15/2019 at Unknown time   • HYDROcodone-acetaminophen (NORCO) 5-325 MG per tablet Take 1 tablet by mouth Every 6 (Six) Hours As Needed for Moderate Pain . 12 tablet 0 6/15/2019 at Unknown time   • HYDROmorphone (DILAUDID) 2 MG tablet Take 2 mg by mouth Every 4 (Four) Hours As Needed for Moderate Pain . Takes 1-2 tabs as needed for pain   6/15/2019 at Unknown time   • lidocaine (LIDODERM) 5 % Place 1 patch on the skin Daily. Remove & Discard patch within 12 hours or as directed by MD   6/16/2019 at Unknown time   • LORazepam (ATIVAN) 0.5 MG tablet Take 0.5 mg by mouth Every 4 (Four) Hours As Needed for Anxiety. Reports using once daily   6/15/2019 at Unknown time   • mirtazapine (REMERON) 15 MG tablet Take 15 mg by mouth Every Night.   6/15/2019 at Unknown time   • multiple vitamin 10 mL in dextrose 5 % 1,000 mL Infuse  into a venous catheter 1 (One) Time Per Week.   6/15/2019 at Unknown time   • norethindrone (MICRONOR) 0.35 MG tablet Take 1 tablet by mouth Every Evening.   6/15/2019 at Unknown time   • ondansetron (ZOFRAN) 8 MG tablet Take  by mouth Every 8 (Eight) Hours As Needed for Nausea or Vomiting.   6/16/2019 at Unknown time   • pantoprazole (PROTONIX) 40 MG EC tablet Take 40 mg by mouth As Needed. Only takes after chemo   6/15/2019 at Unknown time   • Pramoxine HCl (TUCKS HEMORRHOIDAL RE) Insert 1 application into the  rectum Every Other Day.   6/15/2019 at Unknown time   • PREBIOTIC PRODUCT PO Take 1 tablet by mouth Daily.   6/15/2019 at Unknown time   • PROBIOTIC PRODUCT PO Take 1 Scoop by mouth Daily.   6/15/2019 at Unknown time   • prochlorperazine (COMPAZINE) 10 MG tablet Take 10 mg by mouth Every 6 (Six) Hours As Needed for Nausea or Vomiting.   6/15/2019 at Unknown time   • sennosides-docusate sodium (SENOKOT-S) 8.6-50 MG tablet Take 1 tablet by mouth Every Evening.   6/16/2019 at Unknown time   • TURMERIC PO Take 1 tablet by mouth Daily.   6/15/2019 at Unknown time   • zoledronic acid (ZOMETA) 4 MG/100ML solution infusion (premix) Infuse 4 mg into a venous catheter Every 3 (Three) Months.   6/15/2019 at Unknown time   • ibuprofen (ADVIL,MOTRIN) 200 MG tablet Take 400 mg by mouth 2 (Two) Times a Day As Needed for Mild Pain .   Unknown at Unknown time   • ibuprofen (ADVIL,MOTRIN) 600 MG tablet Take 600 mg by mouth Daily As Needed for Mild Pain . This is an old prescription she does not use often, but still has at home.   Unknown at Unknown time     Allergies:  Patient has no known allergies.    Social History     Tobacco Use   • Smoking status: Never Smoker   • Smokeless tobacco: Never Used   Substance Use Topics   • Alcohol use: No   • Drug use: No    and lives with her  and children    Family History   Problem Relation Age of Onset   • Cancer Neg Hx    Sister with polyps.  Mother with large but noncancerous polyp.  Father had strokes but also had traumatic brain injury.  No diabetes.  No heart disease.  Grandfather had stomach cancer.  1 of her children has asthma    Review of Systems   Constitutional: Positive for activity change. Negative for chills and fever.   HENT: Positive for postnasal drip and sinus pressure. Negative for sore throat and trouble swallowing.    Eyes: Negative for visual disturbance.   Respiratory: Positive for cough, shortness of breath and wheezing.         Had had a cough though it  is better.  She has had wheezing off and on.  Albuterol makes her heart race.   Cardiovascular: Negative for chest pain, palpitations and leg swelling.   Gastrointestinal: Positive for abdominal distention, abdominal pain, constipation and nausea.   Endocrine: Negative for polydipsia, polyphagia and polyuria.   Genitourinary: Negative for difficulty urinating and dysuria.   Musculoskeletal: Positive for back pain.   Skin: Negative for rash and wound.   Allergic/Immunologic: Positive for environmental allergies.   Neurological: Negative for dizziness, light-headedness and numbness.   Psychiatric/Behavioral: Negative for agitation and behavioral problems.         Vital Signs  Temp:  [97.1 °F (36.2 °C)-98.7 °F (37.1 °C)] 97.1 °F (36.2 °C)  Heart Rate:  [80-93] 88  Resp:  [18-20] 18  BP: ()/() 94/66  SpO2:  [96 %-99 %] 96 %  on   ;   Device (Oxygen Therapy): room air  Body mass index is 18.32 kg/m².    Physical Exam   Constitutional: She is oriented to person, place, and time. She appears well-developed. No distress.   Thin.  Looks older than age.  No acute distress but chronically ill-appearing   HENT:   Head: Normocephalic and atraumatic.   Right Ear: External ear normal.   Left Ear: External ear normal.   Nose: Nose normal.   Mouth/Throat: Oropharynx is clear and moist. No oropharyngeal exudate.   Eyes: Conjunctivae and EOM are normal. Pupils are equal, round, and reactive to light. Right eye exhibits no discharge. Left eye exhibits no discharge. No scleral icterus.   Neck: Normal range of motion. Neck supple. No tracheal deviation present. No thyromegaly present.   Right jugular vein noted at 30 degrees but also has a port on the right upper chest   Cardiovascular: Normal rate, normal heart sounds and intact distal pulses.   No murmur heard.  Regular with occasional ectopy   Pulmonary/Chest: No stridor. No respiratory distress. She has wheezes. She has no rales. She exhibits no tenderness.   Breath  sounds are decreased.  Mild bilateral expiratory wheezing   Abdominal: Bowel sounds are normal. She exhibits distension. She exhibits no mass. There is tenderness. There is no guarding.   Mild distention.  Diffuse mild tenderness.   Musculoskeletal: She exhibits no edema or deformity.   Lymphadenopathy:     She has no cervical adenopathy.   Neurological: She is oriented to person, place, and time. A sensory deficit is present. No cranial nerve deficit.   Decreased sensation right lower extremity compared to the left using plastic filament   Skin: Skin is dry. No rash noted. She is not diaphoretic. No erythema. No pallor.   Left leg and foot are warmer than the right.  Right hand and left hand temperature appears same   Psychiatric: She has a normal mood and affect. Her behavior is normal.   Nursing note and vitals reviewed.      Results Review:   I reviewed the patient's new clinical results.    Lab Results   Component Value Date    GLUCOSE 93 06/16/2019    BUN 10 06/16/2019    CREATININE 0.62 06/16/2019    EGFRIFNONA 106 06/16/2019    BCR 16.1 06/16/2019    K 3.9 06/16/2019    CO2 22.1 06/16/2019    CALCIUM 9.2 06/16/2019    ALBUMIN 4.00 06/16/2019    AST 13 06/16/2019    ALT 6 06/16/2019       Lab Results   Component Value Date    WBC 8.97 06/16/2019    HGB 11.0 (L) 06/16/2019    HCT 36.3 06/16/2019    MCV 91.4 06/16/2019     06/16/2019             Imaging Results (last 24 hours)     Procedure Component Value Units Date/Time    CT Angiogram Chest With Contrast [[id number]] Collected:  06/16/19 1310     Updated:  06/16/19 1405    Narrative:       CT ANGIOGRAM CHEST WITH CONTRAST-     Radiation dose reduction techniques were utilized, including automated  exposure control and exposure modulation based on body size.     CLINICAL INFORMATION:  Metastatic colon carcinoma, shortness of breath.     COMPARISON: Outside CT examination dated 05/30/2019.     TECHNIQUE: Thin axial images acquired through the chest after  dynamic  contrast enhancement with coronal, sagittal and 3-dimensional  reconstruction.     FINDINGS: There are innumerable nodular areas of consolidation  throughout both lungs, similar to the previous examination. The right  sided pneumothorax has diminished slightly in size within the interim.  No new area of consolidation has developed. No pleural effusion is seen.     Cardiac size within normal limits. No pericardial abnormality. Contour  and caliber of the thoracic aorta is normal, no aneurysm or dissection.  The esophagus is satisfactory in course and caliber.     No pulmonary embolus demonstrated.     CONCLUSION:  1. No pulmonary embolus, aortic aneurysm or dissection.  2. Innumerable nodular areas of consolidation seen throughout both lungs  identical to 05/30/2019, no pleural effusion, acute airspace disease or  new nodule has developed.     Findings called to Dr. Seals in the emergency room at the time of  completion, 1 PM.        This report was finalized on 6/16/2019 2:02 PM by Dr. Bernardo Gallegos M.D.       XR Chest 1 View [[id number]] Collected:  06/16/19 1159     Updated:  06/16/19 1208    Narrative:       XR CHEST 1 VW-     Clinical: 44-year-old female with shortness of breath, history of  metastatic colon carcinoma           COMPARISON outside CT examination 5/30/2019 and chest radiograph  5/9/2019, 12/21/2017     FINDINGS: Similar to the outside CT examination there is a right-sided  pneumothorax, measuring approximately 5-7%. This does not appear to have  increased since previous CT. There is lower inspiratory effort on the  current examination compared to previous chest radiograph. This causing  crowding of the bronchovascular markings and accentuation of the chronic  parenchymal change which has been demonstrated on prior chest  radiographs dating to 2017. Mediport catheter is in stable position. No  pulmonary edema or pleural effusion identified. Cardiomediastinal  silhouette is stable. No  other interval change has occurred.     This report was finalized on 6/16/2019 12:05 PM by Dr. Bernardo Gallegos M.D.               Assessment/Plan   1.  Intractable pain secondary to malignancy.  Patient has colorectal cancer with extensive mets including involvement of L2 and L3.  Start IV Toradol instead of oral ibuprofen.  Continue narcotics.  Ask oncology for input.  Was to have had PET scan 6/14/2019.  It is rescheduled for 6/24/2019  2.  Shortness of breath, better.  Has allergies and asthma (persistent asthma exacerbation).  Check peak flow.  Albuterol causes tachycardia.  Will start Atrovent and steroid mini nebs.  Try Singulair as well.  Continue Allegra  3.  Chronic constipation secondary to pain medications and malignancy.  GI recommended trial of Amitiza and/or Movantik.  The latter is not available here.  Hematemesis started.  Continue stool softeners, senna  4.  Mild anemia, secondary to malignancy.  She has received iron recently.    I discussed the patients findings and my recommendations with patient and  at bedside.  Records reviewed.    Abby Bronson MD  [facility]  06/16/19  5:46 PM

## 2019-06-16 NOTE — PLAN OF CARE
Problem: Patient Care Overview  Goal: Plan of Care Review  Outcome: Ongoing (interventions implemented as appropriate)   06/16/19 7317   Coping/Psychosocial   Plan of Care Reviewed With patient   Plan of Care Review   Progress improving   OTHER   Outcome Summary VSS, pt admit for pain managed RT stage 4 colorectal cancer c / mets, IV Dilaudid x 1 dose, ambulated in room & tavares, CBC consulted, CTM

## 2019-06-17 LAB — CEA SERPL-MCNC: 3.01 NG/ML

## 2019-06-17 PROCEDURE — 25010000002 ENOXAPARIN PER 10 MG: Performed by: INTERNAL MEDICINE

## 2019-06-17 PROCEDURE — 94799 UNLISTED PULMONARY SVC/PX: CPT

## 2019-06-17 PROCEDURE — 25010000002 KETOROLAC TROMETHAMINE PER 15 MG: Performed by: INTERNAL MEDICINE

## 2019-06-17 PROCEDURE — 25010000002 MORPHINE PER 10 MG: Performed by: INTERNAL MEDICINE

## 2019-06-17 PROCEDURE — 99233 SBSQ HOSP IP/OBS HIGH 50: CPT | Performed by: INTERNAL MEDICINE

## 2019-06-17 PROCEDURE — 82378 CARCINOEMBRYONIC ANTIGEN: CPT | Performed by: INTERNAL MEDICINE

## 2019-06-17 RX ORDER — OLANZAPINE 2.5 MG/1
2.5 TABLET ORAL NIGHTLY
Status: DISCONTINUED | OUTPATIENT
Start: 2019-06-17 | End: 2019-06-22 | Stop reason: HOSPADM

## 2019-06-17 RX ORDER — FAMOTIDINE 40 MG/1
40 TABLET, FILM COATED ORAL
Status: DISCONTINUED | OUTPATIENT
Start: 2019-06-17 | End: 2019-06-22 | Stop reason: HOSPADM

## 2019-06-17 RX ORDER — MONTELUKAST SODIUM 10 MG/1
10 TABLET ORAL NIGHTLY
Status: DISCONTINUED | OUTPATIENT
Start: 2019-06-17 | End: 2019-06-22 | Stop reason: HOSPADM

## 2019-06-17 RX ORDER — NAPROXEN 500 MG/1
500 TABLET ORAL 2 TIMES DAILY WITH MEALS
Status: DISCONTINUED | OUTPATIENT
Start: 2019-06-17 | End: 2019-06-20

## 2019-06-17 RX ORDER — CETIRIZINE HYDROCHLORIDE 10 MG/1
10 TABLET ORAL DAILY
Status: DISCONTINUED | OUTPATIENT
Start: 2019-06-17 | End: 2019-06-22 | Stop reason: HOSPADM

## 2019-06-17 RX ORDER — MORPHINE SULFATE 20 MG/ML
10 SOLUTION ORAL
Status: DISCONTINUED | OUTPATIENT
Start: 2019-06-17 | End: 2019-06-19

## 2019-06-17 RX ORDER — MORPHINE SULFATE 2 MG/ML
2 INJECTION, SOLUTION INTRAMUSCULAR; INTRAVENOUS ONCE
Status: COMPLETED | OUTPATIENT
Start: 2019-06-17 | End: 2019-06-17

## 2019-06-17 RX ORDER — GABAPENTIN 100 MG/1
100 CAPSULE ORAL EVERY 12 HOURS SCHEDULED
Status: DISCONTINUED | OUTPATIENT
Start: 2019-06-17 | End: 2019-06-22 | Stop reason: HOSPADM

## 2019-06-17 RX ORDER — ACETAMINOPHEN AND CODEINE PHOSPHATE 120; 12 MG/5ML; MG/5ML
0.35 SOLUTION ORAL DAILY
Status: DISCONTINUED | OUTPATIENT
Start: 2019-06-17 | End: 2019-06-22 | Stop reason: HOSPADM

## 2019-06-17 RX ORDER — MORPHINE SULFATE 15 MG/1
15 TABLET, FILM COATED, EXTENDED RELEASE ORAL EVERY 12 HOURS SCHEDULED
Status: DISCONTINUED | OUTPATIENT
Start: 2019-06-17 | End: 2019-06-22 | Stop reason: HOSPADM

## 2019-06-17 RX ADMIN — FAMOTIDINE 40 MG: 40 TABLET, FILM COATED ORAL at 14:26

## 2019-06-17 RX ADMIN — ACETAMINOPHEN AND CODEINE PHOSPHATE 0.35 MG: 120; 12 SOLUTION ORAL at 21:16

## 2019-06-17 RX ADMIN — MORPHINE SULFATE 2 MG: 2 INJECTION, SOLUTION INTRAMUSCULAR; INTRAVENOUS at 14:38

## 2019-06-17 RX ADMIN — MORPHINE SULFATE 10 MG: 100 SOLUTION ORAL at 13:34

## 2019-06-17 RX ADMIN — MORPHINE SULFATE 15 MG: 15 TABLET, FILM COATED, EXTENDED RELEASE ORAL at 14:27

## 2019-06-17 RX ADMIN — NAPROXEN 500 MG: 500 TABLET ORAL at 17:56

## 2019-06-17 RX ADMIN — DOCUSATE SODIUM 100 MG: 100 CAPSULE, LIQUID FILLED ORAL at 09:02

## 2019-06-17 RX ADMIN — ENOXAPARIN SODIUM 40 MG: 40 INJECTION SUBCUTANEOUS at 17:55

## 2019-06-17 RX ADMIN — GABAPENTIN 100 MG: 100 CAPSULE ORAL at 21:13

## 2019-06-17 RX ADMIN — SODIUM CHLORIDE, PRESERVATIVE FREE 3 ML: 5 INJECTION INTRAVENOUS at 09:14

## 2019-06-17 RX ADMIN — LUBIPROSTONE 8 MCG: 8 CAPSULE, GELATIN COATED ORAL at 17:56

## 2019-06-17 RX ADMIN — Medication 1 CAPSULE: at 09:02

## 2019-06-17 RX ADMIN — SENNOSIDES,DOCUSATE SODIUM 1 TABLET: 50; 8.6 TABLET, FILM COATED ORAL at 17:56

## 2019-06-17 RX ADMIN — MONTELUKAST SODIUM 10 MG: 10 TABLET, FILM COATED ORAL at 21:13

## 2019-06-17 RX ADMIN — SODIUM CHLORIDE, PRESERVATIVE FREE 3 ML: 5 INJECTION INTRAVENOUS at 21:17

## 2019-06-17 RX ADMIN — KETOROLAC TROMETHAMINE 30 MG: 30 INJECTION, SOLUTION INTRAMUSCULAR at 11:41

## 2019-06-17 RX ADMIN — MIRTAZAPINE 15 MG: 15 TABLET, FILM COATED ORAL at 21:16

## 2019-06-17 RX ADMIN — FAMOTIDINE 40 MG: 40 TABLET, FILM COATED ORAL at 17:56

## 2019-06-17 RX ADMIN — LUBIPROSTONE 8 MCG: 8 CAPSULE, GELATIN COATED ORAL at 09:02

## 2019-06-17 RX ADMIN — BUDESONIDE 0.5 MG: 0.5 INHALANT RESPIRATORY (INHALATION) at 07:15

## 2019-06-17 RX ADMIN — LORAZEPAM 0.5 MG: 0.5 TABLET ORAL at 09:02

## 2019-06-17 RX ADMIN — LIDOCAINE 1 PATCH: 50 PATCH TOPICAL at 17:55

## 2019-06-17 RX ADMIN — IPRATROPIUM BROMIDE 0.5 MG: 0.5 SOLUTION RESPIRATORY (INHALATION) at 22:10

## 2019-06-17 RX ADMIN — MORPHINE SULFATE 15 MG: 15 TABLET, FILM COATED, EXTENDED RELEASE ORAL at 21:12

## 2019-06-17 RX ADMIN — IPRATROPIUM BROMIDE 0.5 MG: 0.5 SOLUTION RESPIRATORY (INHALATION) at 14:56

## 2019-06-17 RX ADMIN — IPRATROPIUM BROMIDE 0.5 MG: 0.5 SOLUTION RESPIRATORY (INHALATION) at 07:15

## 2019-06-17 RX ADMIN — OLANZAPINE 2.5 MG: 2.5 TABLET, FILM COATED ORAL at 21:13

## 2019-06-17 RX ADMIN — DOCUSATE SODIUM 100 MG: 100 CAPSULE, LIQUID FILLED ORAL at 21:13

## 2019-06-17 RX ADMIN — BUDESONIDE 0.5 MG: 0.5 INHALANT RESPIRATORY (INHALATION) at 22:10

## 2019-06-17 RX ADMIN — CETIRIZINE HYDROCHLORIDE 10 MG: 10 TABLET, FILM COATED ORAL at 21:13

## 2019-06-17 RX ADMIN — GABAPENTIN 100 MG: 100 CAPSULE ORAL at 14:27

## 2019-06-17 NOTE — PROGRESS NOTES
Discharge Planning Assessment  [facility]     Patient Name: Vita Ren  MRN: [id number]  Today's Date: 6/17/2019    Admit Date: 6/16/2019    Discharge Needs Assessment     Row Name 06/17/19 1723       Living Environment    Lives With  child(emanuel), dependent;spouse    Name(s) of Who Lives With Patient  spouse Bridger Latham [phone]    Current Living Arrangements  home/apartment/condo    Primary Care Provided by  self;spouse/significant other    Provides Primary Care For  child(emanuel)    Family Caregiver if Needed  spouse    Family Caregiver Names  spouse Bridger Latham [phone]    Quality of Family Relationships  helpful    Able to Return to Prior Arrangements  yes       Resource/Environmental Concerns    Resource/Environmental Concerns  none    Transportation Concerns  car, none       Transition Planning    Patient/Family Anticipates Transition to  home with family    Patient/Family Anticipated Services at Transition  hospice care    Transportation Anticipated  family or friend will provide       Discharge Needs Assessment    Concerns to be Addressed  discharge planning    Equipment Currently Used at Home  cane, straight;walker, rolling;oxygen    Anticipated Changes Related to Illness  none    Equipment Needed After Discharge  none    Outpatient/Agency/Support Group Needs  home hospice;homecare agency    Discharge Facility/Level of Care Needs  home with home health    Offered/Gave Vendor List  yes    Current Discharge Risk  physical impairment        Discharge Plan     Row Name 06/17/19 1726       Plan    Plan  Plans home with assistance from her spouse and other family members.  Denies needs; [facility] meeting with the patient and will follow up to al for home assistance needed.      Patient/Family in Agreement with Plan  yes    Plan Comments  Met with the patient at bedside; explained role of CCP, verified facesheet and discussed discharge planning needs.  The patient plans to return home upon d/c with  assistance from her spouse and other family and friends.  The patient resides at home with her spouse Bridger Latham [phone].  The patient has a cane, walker with wheels, home O2 from [employer] and has 3 steps with no handrails to enter her 2 story home however she has everything that she needs on the main level in the home. The patient's primary care is being provided by her [facility] Oncologist Jase Warner.  The patient's pharmacy is [facility] on [facility] [address] and she denies any trouble remembering to take her medication or with affording her medication.  The patient is unsure who she has used in the past for HH, denies any SNF history and was provided with a HH/SNF list.  The patient denies any POA  documents and states that  her spouse transports her to her appointments and will transport her home upon d/c.  The patient currently denies any d/c needs and states that her focus is on pain control and being able to get to the point where her pain can be managed at home.  The patient states that she is not yet ready to change oncologists but wants to address her pain level.    [facility] was consulted and will follow the patient as the patient was informed to contact her insurance company to see if they will cover her receiving chemo and [facility] services both.  [facility] will follow up with the patient as she wants to discuss this plan with her spouse.    CCP will follow to assist with any additional needs that may arise.  India Ureña, [facility]        Destination      No service coordination in this encounter.      Durable Medical Equipment      No service coordination in this encounter.      Dialysis/Infusion      No service coordination in this encounter.      Home Medical Care      No service coordination in this encounter.      Therapy      No service coordination in this encounter.      Community Resources      No service coordination in this encounter.          Demographic Summary     Row Name  06/17/19 1722       General Information    Admission Type  inpatient    Arrived From  home    Referral Source  admission list    Reason for Consult  discharge planning    Preferred Language  English     Used During This Interaction  no        Functional Status     Row Name 06/17/19 1722       Functional Status    Usual Activity Tolerance  moderate    Current Activity Tolerance  fair       Functional Status, IADL    Medications  assistive equipment    Meal Preparation  assistive equipment    Housekeeping  assistive equipment    Laundry  assistive equipment    Shopping  assistive equipment       Mental Status    General Appearance WDL  WDL       Mental Status Summary    Recent Changes in Mental Status/Cognitive Functioning  no changes        Psychosocial    No documentation.       Abuse/Neglect    No documentation.       Legal    No documentation.       Substance Abuse    No documentation.       Patient Forms     Row Name 06/17/19 1722       Patient Forms    Provider Choice List  Delivered    Delivered to  Patient    Method of delivery  In person            ELSIE Herrera

## 2019-06-17 NOTE — PROGRESS NOTES
LOS: 1 day     Name: Vita Ren  Age/Sex: 44 y.o. female  :  1975        PCP: Jase Warner  Chief Complaint   Patient presents with   • Leg Pain     left leg pain starting x1 week ago.    • Shortness of Breath      Subjective   Still having a lot of pain today.  She is doing well this morning but this afternoon developed more pain.  Her medications have been adjusted and she is done well with morphine started this afternoon.  She denies any other new issues or complaints today.  General: No Fever or Chills, Cardiac: No Chest Pain or Palpitations, Resp: No Cough or SOA, GI: No Nausea, Vomiting, or Diarrhea and Other: No bleeding      budesonide 0.5 mg Nebulization BID - RT   cetirizine 10 mg Oral Daily   docusate sodium 100 mg Oral BID   enoxaparin 40 mg Subcutaneous Q24H   famotidine 40 mg Oral BID AC   gabapentin 100 mg Oral Q12H   hydrocortisone-pramoxine  Rectal Every Other Day   ipratropium 0.5 mg Nebulization TID - RT   lactobacillus acidophilus 1 capsule Oral Daily   lidocaine 1 patch Transdermal Q24H   lubiprostone 8 mcg Oral BID With Meals   mirtazapine 15 mg Oral Nightly   montelukast 4 mg Oral Nightly   Morphine 15 mg Oral Q12H   naproxen 500 mg Oral BID With Meals   norethindrone 0.35 mg Oral Daily   OLANZapine 2.5 mg Oral Nightly   sennosides-docusate sodium 1 tablet Oral Q PM   sodium chloride 3 mL Intravenous Q12H       sodium chloride 50 mL/hr Last Rate: 50 mL/hr (19 1340)       Objective   Vital Signs  Temp:  [97.1 °F (36.2 °C)-98.7 °F (37.1 °C)] 98.7 °F (37.1 °C)  Heart Rate:  [71-88] 85  Resp:  [16-20] 16  BP: ()/(65-74) 123/74  Body mass index is 18.32 kg/m².    Intake/Output Summary (Last 24 hours) at 2019 1602  Last data filed at 2019  Gross per 24 hour   Intake 280 ml   Output --   Net 280 ml       Physical Exam   Constitutional: She is oriented to person, place, and time. She appears well-developed and well-nourished.   HENT:   Head: Normocephalic and  atraumatic.   Cardiovascular: Normal rate, regular rhythm and normal heart sounds.   Abdominal: Soft. Bowel sounds are normal. She exhibits no distension.   Neurological: She is alert and oriented to person, place, and time.   Skin: Skin is warm and dry.   Psychiatric: She has a normal mood and affect. Her behavior is normal.   Nursing note and vitals reviewed.        Results Review:       I reviewed the patient's new clinical results.  Results from last 7 days   Lab Units 06/16/19  1102 06/12/19  0440 06/11/19  2250   WBC 10*3/mm3 8.97 6.95 8.06   HEMOGLOBIN g/dL 11.0* 9.5* 10.8*   PLATELETS 10*3/mm3 214 232 269     Results from last 7 days   Lab Units 06/16/19  1102 06/12/19  0440 06/11/19  2250   SODIUM mmol/L 143 141 141   POTASSIUM mmol/L 3.9 4.3 4.0   CHLORIDE mmol/L 108* 109* 106   CO2 mmol/L 22.1 23.9 23.4   BUN mg/dL 10 6 7   CREATININE mg/dL 0.62 0.54* 0.77   CALCIUM mg/dL 9.2 7.4* 8.4*   Estimated Creatinine Clearance: 96.1 mL/min (by C-G formula based on SCr of 0.62 mg/dL).      Assessment/Plan     Pain of metastatic malignancy    Colon cancer metastasized to multiple sites including liver, lung, iliac lymph nodes and bone (CMS/HCC)    Left hip pain    L3 vertebral fracture (CMS/HCC)    Constipation    Colorectal cancer, stage IV (CMS/HCC)    Mild persistent asthma with acute exacerbation    Intractable back pain      PLAN  -Initiate oncology's recommendations for pain medications.  I discussed the changes with the patient in depth at the bedside will probably need a higher dose of gabapentin before its all said and done.  -We will ask neurosurgery to weigh in regarding the vertebral compression fracture to discuss whether or not she is a candidate for kyphoplasty  -Continue medications for opiate-induced constipation.      Disposition  To be determined but hopefully home in the next 48 hours      Sanchez Reyes MD  [facility]  06/17/19  4:02 PM

## 2019-06-17 NOTE — PLAN OF CARE
Problem: Fall Risk (Adult)  Goal: Identify Related Risk Factors and Signs and Symptoms  Outcome: Outcome(s) achieved Date Met: 06/17/19    Goal: Absence of Fall  Outcome: Ongoing (interventions implemented as appropriate)      Problem: Pain, Chronic (Adult)  Goal: Identify Related Risk Factors and Signs and Symptoms  Outcome: Outcome(s) achieved Date Met: 06/17/19    Goal: Acceptable Pain/Comfort Level and Functional Ability  Outcome: Ongoing (interventions implemented as appropriate)      Problem: Patient Care Overview  Goal: Plan of Care Review  Outcome: Ongoing (interventions implemented as appropriate)   06/17/19 1145   Coping/Psychosocial   Plan of Care Reviewed With patient   Plan of Care Review   Progress no change   OTHER   Outcome Summary patient did not have much pain for the morning, but was hit pretty strong with pain this afternoon. seen by dr. gaviria and meds were adjusted. ms contin and neurontin started on schedule. sublingual morphine ordered and given for fast relief. goal is to get patient on good pain regimen for home maintenance. ambulates in room and tavares.      Goal: Individualization and Mutuality  Outcome: Ongoing (interventions implemented as appropriate)    Goal: Discharge Needs Assessment  Outcome: Ongoing (interventions implemented as appropriate)    Goal: Interprofessional Rounds/Family Conf  Outcome: Ongoing (interventions implemented as appropriate)

## 2019-06-17 NOTE — CONSULTS
"Spiritual Support:  Pt was tearful, honest and vulnerable.  Pt wants to find a way to manage her pain and pursue clinical trials, but she's aware that IV Dilaudid is \"the only thing that helps.\"  Pt grieving appropriately and wants to continue whatever treatments that will extend her life but also manage the pain so as not to be miserable.  Pt very open to prayer and her spirituality has been foundational to her coping and hoping, but pain and fear are testing her ability to feel God's presence and her natural grieving includes spiritual distress which I will address.  Will utilize the [employer] Spiritual History as appropriate to assist in offering pastoral care and assessment.  grady Feliciano      The [employer] Spiritual History Tool ©™   serves as a guide for conversations in the clinical setting and can help structure questions in taking a spiritual history by healthcare professionals.  F - Sa[place] and Belief  \"Do you consider yourself spiritual or Congregation?\"     \"Is spirituality something important to you?”     “Do you have spiritual beliefs that help you cope with stress/ difficult times?\"      If the patient responds \"No,\" the health care provider might ask, \"What gives your life meaning?\" Sometimes patients respond with answers such as family, career, or nature.  (The question of meaning should also be asked even if people answer yes to spirituality)    I - Importance  \"What importance does your spirituality have in our life?  Has your spirituality influenced how you take care of yourself, your health?   Does your spirituality influence you in your healthcare decision making? (e.g. advance directives, treatment, etc.)      C - Community  \"Are you part of a spiritual community?     Communities such as churches, temples, and mosques, or a group of like-minded friends, family, or yoga can serve as strong support systems for some patients.  Can explore further: Is this of support to you and how? Is there a group of " "people you really love or who are important to you?\"  A - Address in Care  \"How would you like me, your healthcare provider, to address these issues in your healthcare?\"      (With the newer models including diagnosis of spiritual distress A also refers to the \"Assessment and Plan\" of patient spiritual distress or issues within a treatment or care plan  © Copyright, Chapis Killian MD, 1996  Citation: ADRIA Killian, & ANNABEL Elkins (2000). Taking a spiritual history allows clinicians to understand patients more fully. Journal of palliative medicine, 3(1) 129-137.  As with any other part of the patient interview, the spiritual histories should be patient-centered. Thus, the tool is meant to create an environment of trust by indicating to the patient that the physician or other healthcare professional is open to listening to the patient about his or her spiritual issues, if the patient wants to talk about those issues. There are ethical guidelines to which the physician or healthcare provider should adhere when taking a spiritual history. Healthcare professionals are encouraged not to use the [employer] tool as a checklist, but rather to rely on it as a guide to aid and open the discussion to spiritual issues. See more recommendations for taking a spiritual history.        "

## 2019-06-17 NOTE — PROGRESS NOTES
Adult Nutrition  Assessment/PES    Patient Name:  Vita Ren  YOB: 1975  MRN: [id number]  Admit Date:  6/16/2019    Assessment Date:  6/17/2019    Nutrition assessment triggered by low BMI-18.3. #, currently 113#. Good appetite-75% of meals.  Provided nutrition therapy. RD to follow per protocol.  Reason for Assessment     Row Name 06/17/19 1205          Reason for Assessment    Reason For Assessment  identified at risk by screening criteria     Diagnosis  cancer diagnosis/related complications Primary Problem:  Pain of metastatic malignancy; stage 4 colorectal cancer with mets     Identified At Risk by Screening Criteria low BMI-18.3         Nutrition/Diet History     Row Name 06/17/19 1205          Nutrition/Diet History    Typical Food/Fluid Intake  good appetite-75% of meals  Weight stable         Anthropometrics     Row Name 06/17/19 1205          Usual Body Weight (UBW)    Usual Body Weight  50.8 kg (112 lb)        Body Mass Index (BMI)    BMI Assessment  BMI 17-18.4: protein-energy malnutrition grade I         Labs/Tests/Procedures/Meds     Row Name 06/17/19 1205          Labs/Procedures/Meds    Lab Results Reviewed  reviewed, pertinent        Diagnostic Tests/Procedures    Diagnostic Test/Procedure Reviewed  reviewed, pertinent        Medications    Pertinent Medications Reviewed  reviewed, pertinent     Pertinent Medications Comments  colace, probiotic, remeron, NaCl         Physical Findings     Row Name 06/17/19 1206          Physical Findings    Overall Physical Appearance  underweight B=20         Estimated/Assessed Needs     Row Name 06/17/19 1206          Calculation Measurements    Weight Used For Calculations  51.5 kg (113 lb 8.6 oz)        Estimated/Assessed Needs    Additional Documentation  KCAL/KG (Group);Protein Requirements (Group);Fluid Requirements (Group)        KCAL/KG    KCAL/KG  25 Kcal/Kg (kcal);30 Kcal/Kg (kcal)     25 Kcal/Kg (kcal)  1287.5     30 Kcal/Kg  (kcal)  1545        Protein Requirements    Est Protein Requirement Amount (gms/kg)  1.2 gm protein     Estimated Protein Requirements (gms/day)  61.8        Fluid Requirements    Estimated Fluid Requirements (mL/day)  1300     RDA Method (mL)  1300     [place] Method (over 20 kg)  2530         Nutrition Prescription Ordered     Row Name 06/17/19 1206          Nutrition Prescription PO    Current PO Diet  Regular         Evaluation of Received Nutrient/Fluid Intake     Row Name 06/17/19 1206          Calculation Measurements    Weight Used For Calculations  51.5 kg (113 lb 8.6 oz)        PO Evaluation    Number of Meals  1     % PO Intake  75         Evaluation of Prescribed Nutrient/Fluid Intake     Row Name 06/17/19 1206          Calculation Measurements    Weight Used For Calculations  51.5 kg (113 lb 8.6 oz)             Problem/Interventions:  Problem 1     Row Name 06/17/19 1206          Nutrition Diagnoses Problem 1    Problem 1  Underweight     Etiology (related to)  MNT for Treatment/Condition     Signs/Symptoms (evidenced by)  BMI     BMI  18 - 18.9                 Intervention Goal     Row Name 06/17/19 1206          Intervention Goal    General  Maintain nutrition;Meet nutritional needs for age/condition     PO  Tolerate PO;Maintain intake     Weight  Appropriate weight gain         Nutrition Intervention     Row Name 06/17/19 1207          Nutrition Intervention    RD/Tech Action  Follow Tx progress;Care plan reviewd           Education/Evaluation     Row Name 06/17/19 1207          Education    Education  Will Instruct as appropriate        Monitor/Evaluation    Monitor  Per protocol           Electronically signed by:  Kathleen Sanchez RD  06/17/19 1:03 PM

## 2019-06-17 NOTE — CONSULTS
Met with patient to explain hospice services. Patient wanting information. Patient currently on Folfiri. Explained that she would need to call her insurance, and see if they would continue to cover if she elected her hosparus benefit. Patient states she would like to discuss with her  and look into her insurance benefit. She would like a follow-up call in one week. Pre-admission completed today, and patient instructed to call us if she would like to elect Hospice benefits sooner. Thank you for the referral and for allowing me to participate in the care of this patient.    Effie Richter, RN  [facility]  [phone]

## 2019-06-17 NOTE — PROGRESS NOTES
REASON FOR FOLLOWUP/CHIEF COMPLAINT:  Metastatic colorectal cancer    HISTORY OF PRESENT ILLNESS:   This patient has been admitted to the hospital for severe pain radiating to the left lower extremity for several weeks not controlled with Dilaudid at all. She was taking Dilaudid at home and it was not phasing the pain at all with this medication.  Obviously, the nature of the pain is difficult to know because she has had metastases to the lumbar spine and has had CyberKnife therapy to this area before.  The other issue is the patient has fullness sensation in the pelvis, has some component of difficulty controlling her bladder function.  He has not had any constipation.  Review of the CT scans is very bothersome regarding interpretation.  In any event, I discussed facts regarding management after her physical examination performed today.  Please review below.      Past Medical History, Past Surgical History, Social History, Family History have been reviewed and are without significant changes except as mentioned.    Review of Systems   Review of Systems   Constitutional: Positive for fatigue. Negative for activity change.   HENT: Negative.  Negative for nosebleeds and trouble swallowing.    Eyes: Negative.    Respiratory: Positive for cough. Negative for shortness of breath and wheezing.    Cardiovascular: Negative.  Negative for chest pain and palpitations.   Gastrointestinal: Positive for abdominal pain. Negative for constipation, diarrhea and nausea.   Endocrine: Negative.    Genitourinary: Positive for dysuria and pelvic pain. Negative for hematuria.   Musculoskeletal: Positive for back pain. Negative for arthralgias and myalgias.        Radicular pain lle   Skin: Negative.  Negative for rash and wound.   Neurological: Negative.  Negative for seizures and syncope.   Hematological: Negative.  Negative for adenopathy. Does not bruise/bleed easily.   Psychiatric/Behavioral: Negative for confusion. The  patient is nervous/anxious.        Medications:  The current medication list was reviewed in the EMR    ALLERGIES:  No Known Allergies           Vitals:    06/17/19 0444 06/17/19 0715 06/17/19 0741 06/17/19 0806   BP:    113/73   BP Location:    Left arm   Patient Position:    Lying   Pulse: 80 86 84 81   Resp: 18 20 20 18   Temp: 97.3 °F (36.3 °C)   98.7 °F (37.1 °C)   TempSrc: Oral   Oral   SpO2: 98% 99%  99%   Weight:       Height:         Physical Exam    CONSTITUTIONAL:  Vital signs reviewed.  No distress, looks comfortable.  EYES:  Conjunctiva and lids unremarkable.  PERRLA  EARS,NOSE,MOUTH,THROAT:  Ears and nose appear unremarkable.  Lips, teeth, gums appear unremarkable.  RESPIRATORY:  Normal respiratory effort.  Lungs clear to auscultation bilaterally.  CARDIOVASCULAR:  Normal S1, S2.  No murmurs rubs or gallops.  No significant lower extremity edema.  GASTROINTESTINAL: Abdomen appears unremarkable.  Nontender.  No hepatomegaly.  No splenomegaly.  NEURO: cranial nerves 2-12 grossly intact.  No focal deficits.  Appears to have equal strength all 4 extremities.  MUSCULOSKELETAL:  Unremarkable digits/nails.  No cyanosis or clubbing.  SKIN:  Warm.  No rashes.  PSYCHIATRIC:  Normal judgment and insight.  Normal mood and affect.    I did a rectal examination on this patient today.  The sphincter tone is completely normal, posterior wall of the rectum is unremarkable, there is no tenderness in the sacrum, masses or abnormalities.  There is a sensation of fullness toward the front between the rectum, bladder and uterus that is difficult to interpret, irregular, nodular and mildly tender.  The stool was brown, normal color.  No Hemoccult testing was done.  She had normal sphincter tone and no numbness in the perineal area.        RECENT LABS:  WBC   Date Value Ref Range Status   06/16/2019 8.97 3.40 - 10.80 10*3/mm3 Final     Hemoglobin   Date Value Ref Range Status   06/16/2019 11.0 (L) 12.0 - 15.9 g/dL Final      Platelets   Date Value Ref Range Status   06/16/2019 214 140 - 450 10*3/mm3 Final       ASSESSMENT/PLAN:      *Metastatic colorectal cancer, diagnosed 2011.  Receives treatment through the [facility].  Last treatment was FOLFIRI Erbitux.  States she did not did not tolerate Erbitux and therefore Erbitux was dropped with plans to resume FOLFIRI this month.     *LLQ abdominal pain rating to the left back and left leg.  MRI has been ordered by hospitalist service.  I will also order a CT of the abdomen and pelvis to evaluate this.  This could be related to her constipation issues.  She states she had a CT at the [facility] last Thursday and is scheduled for a PET scan at the [facility] this Monday.    CT AP and MRI L-spine performed here to look for an acute problem.  · CT AP 6/12/2019, from 3/28/2019: New small pleural effusions with increase in suspected basilar pulmonary metastatic disease.  Decrease in circumscribed perirectal fluid collection 3.7 x 1.9 cm, from 7.8 x 5.7 cm.  No clear etiology for the pain.  Mild constipation.  · CT images personally viewed by me.  · MRI L-spine 6/12/2019: L2 and L3 metastasis with pathologic compression fracture of L3 no significant encroachment of spinal canal or neural foramina at any level in the lumbar spine.  · Left hip x-ray 6/12/2019: Negative.  · Therefore, no acute cause of the pain is found on CT AP or MRI L-spine.  Perhaps the pain is related to her mild constipation.  · She would like to have a colonoscopy to evaluate the pain.  CT scan shows no obstruction.  I suspect this could be done as an outpatient but would defer this to the hospitalist service.       *Chronic constipation.                   Change Senokot-S to schedule II tablets nightly.     Plan  After a lengthy discussion with the patient today analyzing the pain she has, it is difficult to determine how much of the pain is pertinent to metastases to the bone  and how much is pertinent to disease in the pelvis.  In my opinion, CT scan of the abdomen is very abnormal and to me there is a soft tissue in the pelvis that could be implying some component of pain.  The patient’s radicular component is into the left lower extremity.  There is no obvious motor or sensory deficit as far as I can tell, but it caught my attention in that regard.      Under the present circumstances, Dilaudid is not working and I will discontinue this medicine altogether and we are going to pursue a different avenue.  The patient will initiate today, MS Contin 15 mg p.o. every 12 hours and use morphine immediate release 10 mg every 3 hours on p.r.n. basis for breakthrough pain and initiate gabapentin 100 mg p.o. b.i.d.  Also, I will add Naprosyn 500 mg p.o. b.i.d. along with some gastric protection in the form of Pepcid 4 mg p.o. b.i.d.     The patient has a plan for a scan to be done as an outpatient next week at [facility] and will followup with her attending physicians in that facility.  We will give her 24-hours of the pain control regimen to see how things evolve from the point of view of pain.  She prefers to be at home with her family than here seeing that Dilaudid is no longer controlling her problem.    I did a rectal examination in the presence of the nurse taking care of the patient today.

## 2019-06-18 ENCOUNTER — TELEPHONE (OUTPATIENT)
Dept: GASTROENTEROLOGY | Facility: CLINIC | Age: 44
End: 2019-06-18

## 2019-06-18 PROCEDURE — 94799 UNLISTED PULMONARY SVC/PX: CPT

## 2019-06-18 PROCEDURE — 25010000002 ENOXAPARIN PER 10 MG: Performed by: INTERNAL MEDICINE

## 2019-06-18 PROCEDURE — 99222 1ST HOSP IP/OBS MODERATE 55: CPT | Performed by: PHYSICIAN ASSISTANT

## 2019-06-18 PROCEDURE — 99233 SBSQ HOSP IP/OBS HIGH 50: CPT | Performed by: INTERNAL MEDICINE

## 2019-06-18 RX ORDER — LORAZEPAM 0.5 MG/1
0.5 TABLET ORAL EVERY 6 HOURS PRN
Status: DISCONTINUED | OUTPATIENT
Start: 2019-06-18 | End: 2019-06-22 | Stop reason: HOSPADM

## 2019-06-18 RX ADMIN — LIDOCAINE 0.5 PATCH: 50 PATCH TOPICAL at 17:54

## 2019-06-18 RX ADMIN — MONTELUKAST SODIUM 10 MG: 10 TABLET, FILM COATED ORAL at 20:48

## 2019-06-18 RX ADMIN — Medication 1 CAPSULE: at 08:53

## 2019-06-18 RX ADMIN — MORPHINE SULFATE 15 MG: 15 TABLET, FILM COATED, EXTENDED RELEASE ORAL at 08:52

## 2019-06-18 RX ADMIN — DOCUSATE SODIUM 100 MG: 100 CAPSULE, LIQUID FILLED ORAL at 20:47

## 2019-06-18 RX ADMIN — FAMOTIDINE 40 MG: 40 TABLET, FILM COATED ORAL at 08:52

## 2019-06-18 RX ADMIN — ACETAMINOPHEN AND CODEINE PHOSPHATE 0.35 MG: 120; 12 SOLUTION ORAL at 20:50

## 2019-06-18 RX ADMIN — IPRATROPIUM BROMIDE 0.5 MG: 0.5 SOLUTION RESPIRATORY (INHALATION) at 06:55

## 2019-06-18 RX ADMIN — NAPROXEN 500 MG: 500 TABLET ORAL at 08:54

## 2019-06-18 RX ADMIN — GABAPENTIN 100 MG: 100 CAPSULE ORAL at 08:53

## 2019-06-18 RX ADMIN — SENNOSIDES,DOCUSATE SODIUM 1 TABLET: 50; 8.6 TABLET, FILM COATED ORAL at 16:31

## 2019-06-18 RX ADMIN — IPRATROPIUM BROMIDE 0.5 MG: 0.5 SOLUTION RESPIRATORY (INHALATION) at 19:27

## 2019-06-18 RX ADMIN — LUBIPROSTONE 8 MCG: 8 CAPSULE, GELATIN COATED ORAL at 17:54

## 2019-06-18 RX ADMIN — ENOXAPARIN SODIUM 40 MG: 40 INJECTION SUBCUTANEOUS at 17:52

## 2019-06-18 RX ADMIN — LORAZEPAM 0.5 MG: 0.5 TABLET ORAL at 13:08

## 2019-06-18 RX ADMIN — SODIUM CHLORIDE, PRESERVATIVE FREE 3 ML: 5 INJECTION INTRAVENOUS at 08:54

## 2019-06-18 RX ADMIN — FAMOTIDINE 40 MG: 40 TABLET, FILM COATED ORAL at 16:31

## 2019-06-18 RX ADMIN — IPRATROPIUM BROMIDE 0.5 MG: 0.5 SOLUTION RESPIRATORY (INHALATION) at 14:38

## 2019-06-18 RX ADMIN — SODIUM CHLORIDE, PRESERVATIVE FREE 3 ML: 5 INJECTION INTRAVENOUS at 20:50

## 2019-06-18 RX ADMIN — MIRTAZAPINE 15 MG: 15 TABLET, FILM COATED ORAL at 20:48

## 2019-06-18 RX ADMIN — CETIRIZINE HYDROCHLORIDE 10 MG: 10 TABLET, FILM COATED ORAL at 20:48

## 2019-06-18 RX ADMIN — BUDESONIDE 0.5 MG: 0.5 INHALANT RESPIRATORY (INHALATION) at 19:27

## 2019-06-18 RX ADMIN — SODIUM CHLORIDE 50 ML/HR: 9 INJECTION, SOLUTION INTRAVENOUS at 20:50

## 2019-06-18 RX ADMIN — MORPHINE SULFATE 15 MG: 15 TABLET, FILM COATED, EXTENDED RELEASE ORAL at 20:47

## 2019-06-18 RX ADMIN — LUBIPROSTONE 8 MCG: 8 CAPSULE, GELATIN COATED ORAL at 08:52

## 2019-06-18 RX ADMIN — BUDESONIDE 0.5 MG: 0.5 INHALANT RESPIRATORY (INHALATION) at 06:56

## 2019-06-18 RX ADMIN — GABAPENTIN 100 MG: 100 CAPSULE ORAL at 20:47

## 2019-06-18 RX ADMIN — DOCUSATE SODIUM 100 MG: 100 CAPSULE, LIQUID FILLED ORAL at 08:53

## 2019-06-18 RX ADMIN — NAPROXEN 500 MG: 500 TABLET ORAL at 17:54

## 2019-06-18 NOTE — TELEPHONE ENCOUNTER
pls request CT and oncologist notes from [facility]- pt recently seen in hospital w/plans for outpt f/u

## 2019-06-18 NOTE — CONSULTS
Inpatient Neurosurgery Consult  Consult performed by: Francisca Duvall PA-C  Consult ordered by: Sanchez Reyes MD  Reason for consult: back pain  Assessment/Recommendations: Ms. Ren is a 44-year-old female with a past medical history of metastatic stage IV colorectal cancer initially diagnosed in 2011 with known metastases to the liver, lung, lymph nodes and bone.  The patient has seen multiple physicians in the past but states that her primary oncologist and radiation oncologist have been at [facility].  She thinks that she was first diagnosed with metastases to the lumbar region about a year ago.  She states that Dr. Connelly at [facility] did about 5 stereotactic radiosurgery treatments to the lumbar spine about 8 or 9 months ago.  I have requested these records but have not yet received them.  She reports a history of low back pain for about the past year.  However initially the pain improved after she had a left ovarian cyst removed and had her fallopian tubes removed.  She then also had drainage of a perirectal cyst in April that offered much relief.  Unfortunately the pain she currently describes is more of a localized discomfort in the upper lumbar region that is fairly constant but worsens with any particular activity.  She denies any significant radicular pain in the legs but occasionally will have left lateral and anterior thigh discomfort.  She denies any recent change in strength or sensation.  The pain tends to be worse in the evenings or again after prolonged or repetitive activity.  She had an MRI of the lumbar spine on June 11 which I have reviewed which demonstrates L2 and L3 metastases with a pathologic compression fracture at L3.  No severe stenosis or severe nerve compression.    Her exam does not reveal any focal deficits.     She understands that we were asked to see her to discuss the potential kyphoplasty.  She would like to avoid surgical intervention if possible and has noticed a  significant improvement in her pain with some changes in her pain medication in the last day or 2.  She would like to think about the procedure and in the interim I have requested that we get the radiation records from [facility].  We will follow-up tomorrow and see how her pain has been with a new pain medications and if she has made a decision regarding surgery.  Did discuss the basics of the procedure with her today.          Patient Care Team:  Jase Warner as PCP - General (Internal Medicine)    Chief complaint:back pain    Subjective     L spine MRI reviewed, discussed above      ..Lab             06/16/19                       1102          WBC          8.97          HEMOGLOBIN   11.0*         HEMATOCRIT   36.3          PLATELETS    214               ..Lab             06/16/19                       1102          SODIUM       143           POTASSIUM    3.9           CHLORIDE     108*          CO2          22.1          BUN          10            CREATININE   0.62          GLUCOSE      93            CALCIUM      9.2                     Back Pain   This is a chronic problem. The current episode started more than 1 year ago. The problem occurs constantly. The problem has been gradually worsening since onset. The pain is present in the lumbar spine. The pain is at a severity of 6/10. The pain is moderate. Associated symptoms include leg pain. Pertinent negatives include no bladder incontinence, bowel incontinence, perianal numbness or weakness. Risk factors include history of cancer.       Review of Systems   Gastrointestinal: Negative for bowel incontinence.   Genitourinary: Negative for bladder incontinence and difficulty urinating.   Musculoskeletal: Positive for back pain.   Neurological: Negative for weakness.   All other systems reviewed and are negative.       Past Medical History:   Diagnosis Date   • Cancer (CMS/HCC)     METASTATIC STAGE 4 COLORECTAL TO LIVER AND LUNGS   • Pneumothorax     RIGHT LUNG   ,    Past Surgical History:   Procedure Laterality Date   • CHEST TUBE INSERTION     • COLON RESECTION      COLORECTAL RESECTION   ,   Family History   Problem Relation Age of Onset   • Cancer Neg Hx    ,   Social History     Tobacco Use   • Smoking status: Never Smoker   • Smokeless tobacco: Never Used   Substance Use Topics   • Alcohol use: No   • Drug use: No   ,   Medications Prior to Admission   Medication Sig Dispense Refill Last Dose   • BROCCOLI EXTRACT PO Take 1 tablet by mouth Daily.   6/15/2019 at Unknown time   • Calcium Citrate 150 MG capsule Take 1 capsule by mouth Daily.   6/15/2019 at Unknown time   • Capsicum, Cayenne, (CAYENNE PEPPER PO) Take 1 tablet by mouth Daily.   6/15/2019 at Unknown time   • CBD (cannabidiol) oral oil Take  by mouth 2 (Two) Times a Day. Takes 0.5 mg by mouth every morning and evening.   6/15/2019 at Unknown time   • dexamethasone (DECADRON) 4 MG tablet Take 4 mg by mouth. Takes 1 tab twice daily on days 2 and 3 of chemotherapy as directed   6/15/2019 at Unknown time   • docusate sodium (COLACE) 100 MG capsule Take 100 mg by mouth 2 (Two) Times a Day.   6/16/2019 at Unknown time   • fexofenadine (ALLEGRA) 180 MG tablet Take 180 mg by mouth Daily.   6/15/2019 at Unknown time   • Green Tea, Lorena sinensis, (GREEN TEA EXTRACT PO) Take 1 tablet by mouth Daily.   6/15/2019 at Unknown time   • HYDROcodone-acetaminophen (NORCO) 5-325 MG per tablet Take 1 tablet by mouth Every 6 (Six) Hours As Needed for Moderate Pain . 12 tablet 0 6/15/2019 at Unknown time   • HYDROmorphone (DILAUDID) 2 MG tablet Take 2 mg by mouth Every 4 (Four) Hours As Needed for Moderate Pain . Takes 1-2 tabs as needed for pain   6/15/2019 at Unknown time   • lidocaine (LIDODERM) 5 % Place 1 patch on the skin Daily. Remove & Discard patch within 12 hours or as directed by MD   6/16/2019 at Unknown time   • LORazepam (ATIVAN) 0.5 MG tablet Take 0.5 mg by mouth Every 4 (Four) Hours As Needed for Anxiety. Reports  using once daily   6/15/2019 at Unknown time   • mirtazapine (REMERON) 15 MG tablet Take 15 mg by mouth Every Night.   6/15/2019 at Unknown time   • multiple vitamin 10 mL in dextrose 5 % 1,000 mL Infuse  into a venous catheter 1 (One) Time Per Week.   6/15/2019 at Unknown time   • norethindrone (MICRONOR) 0.35 MG tablet Take 1 tablet by mouth Every Evening.   6/15/2019 at Unknown time   • ondansetron (ZOFRAN) 8 MG tablet Take  by mouth Every 8 (Eight) Hours As Needed for Nausea or Vomiting.   6/16/2019 at Unknown time   • pantoprazole (PROTONIX) 40 MG EC tablet Take 40 mg by mouth As Needed. Only takes after chemo   6/15/2019 at Unknown time   • Pramoxine HCl (TUCKS HEMORRHOIDAL RE) Insert 1 application into the rectum Every Other Day.   6/15/2019 at Unknown time   • PREBIOTIC PRODUCT PO Take 1 tablet by mouth Daily.   6/15/2019 at Unknown time   • PROBIOTIC PRODUCT PO Take 1 Scoop by mouth Daily.   6/15/2019 at Unknown time   • prochlorperazine (COMPAZINE) 10 MG tablet Take 10 mg by mouth Every 6 (Six) Hours As Needed for Nausea or Vomiting.   6/15/2019 at Unknown time   • sennosides-docusate sodium (SENOKOT-S) 8.6-50 MG tablet Take 1 tablet by mouth Every Evening.   6/16/2019 at Unknown time   • TURMERIC PO Take 1 tablet by mouth Daily.   6/15/2019 at Unknown time   • zoledronic acid (ZOMETA) 4 MG/100ML solution infusion (premix) Infuse 4 mg into a venous catheter Every 3 (Three) Months.   6/15/2019 at Unknown time   • ibuprofen (ADVIL,MOTRIN) 200 MG tablet Take 400 mg by mouth 2 (Two) Times a Day As Needed for Mild Pain .   Unknown at Unknown time   • ibuprofen (ADVIL,MOTRIN) 600 MG tablet Take 600 mg by mouth Daily As Needed for Mild Pain . This is an old prescription she does not use often, but still has at home.   Unknown at Unknown time   , Scheduled Meds:    budesonide 0.5 mg Nebulization BID - RT   cetirizine 10 mg Oral Daily   docusate sodium 100 mg Oral BID   enoxaparin 40 mg Subcutaneous Q24H    famotidine 40 mg Oral BID AC   gabapentin 100 mg Oral Q12H   hydrocortisone-pramoxine  Rectal Every Other Day   ipratropium 0.5 mg Nebulization TID - RT   lactobacillus acidophilus 1 capsule Oral Daily   lidocaine 1 patch Transdermal Q24H   lubiprostone 8 mcg Oral BID With Meals   mirtazapine 15 mg Oral Nightly   montelukast 10 mg Oral Nightly   Morphine 15 mg Oral Q12H   naproxen 500 mg Oral BID With Meals   norethindrone 0.35 mg Oral Daily   OLANZapine 2.5 mg Oral Nightly   sennosides-docusate sodium 1 tablet Oral Q PM   sodium chloride 3 mL Intravenous Q12H   , Continuous Infusions:    sodium chloride 50 mL/hr Last Rate: 50 mL/hr (06/17/19 1340)   , PRN Meds:  •  acetaminophen  •  LORazepam  •  morphine  •  ondansetron  •  [COMPLETED] Insert peripheral IV **AND** sodium chloride  •  sodium chloride and Allergies:  Patient has no known allergies.    Objective      Vital Signs  Temp:  [97 °F (36.1 °C)-97.9 °F (36.6 °C)] 97.9 °F (36.6 °C)  Heart Rate:  [] 66  Resp:  [16-18] 16  BP: (105-121)/(63-75) 110/75    Physical Exam   Constitutional: She is oriented to person, place, and time. She appears well-developed and well-nourished.   HENT:   Head: Normocephalic and atraumatic.   Right Ear: External ear normal.   Left Ear: External ear normal.   Eyes: Conjunctivae and EOM are normal. Pupils are equal, round, and reactive to light. Right eye exhibits no discharge. Left eye exhibits no discharge.   Neck: Normal range of motion. Neck supple. No tracheal deviation present.   Cardiovascular: Intact distal pulses.   Pulmonary/Chest: Effort normal. No stridor. No respiratory distress.   Musculoskeletal: Normal range of motion. She exhibits no edema, tenderness or deformity.   Neurological: She is alert and oriented to person, place, and time. She has normal strength and normal reflexes. She displays no atrophy, no tremor and normal reflexes. No cranial nerve deficit or sensory deficit. She exhibits normal muscle tone.  She displays a negative Romberg sign. She displays no seizure activity. Coordination and gait normal.   No long tract signs   Skin: Skin is warm and dry.   Psychiatric: She has a normal mood and affect. Her behavior is normal. Judgment and thought content normal.   Nursing note and vitals reviewed.      Results Review:    I reviewed the patient's new clinical results.  I reviewed the patient's new imaging results and agree with the interpretation.        Assessment/Plan       Pain of metastatic malignancy    Colon cancer metastasized to multiple sites including liver, lung, iliac lymph nodes and bone (CMS/HCC)    Left hip pain    L3 vertebral fracture (CMS/HCC)    Constipation    Colorectal cancer, stage IV (CMS/HCC)    Mild persistent asthma with acute exacerbation    Intractable back pain      Assessment:  (Stage IV met colorectal CA with known bone mets including L spine mets   hx of previous stereotactic radiosurgery at [facility] for L2, L3 mets  Worsening LBP).     Plan:   (Again, patient would like to think about her options and in the interim we have requested records from [facility] regarding her previous radiation to the spine.  She is feeling much better with some changes in her pain medication.  Certainly from our standpoint of her pain is tolerable she could go home.  She has a regularly scheduled PET scan in the next week or so and could always return to see us thereafter when she has discussed the results of that with her regular oncologist and possible also gotten an updated opinion from her radiation oncologist.     We will follow-up tomorrow.).       I discussed the patients findings and my recommendations with patient    Francisca Duvall PA-C  06/18/19  4:06 PM    Time: 45min

## 2019-06-18 NOTE — PLAN OF CARE
Problem: Patient Care Overview  Goal: Plan of Care Review  Outcome: Ongoing (interventions implemented as appropriate)   06/18/19 3859   Coping/Psychosocial   Plan of Care Reviewed With patient   Plan of Care Review   Progress improving   OTHER   Outcome Summary Pain medications given as scheduled. Pain seem very controlled. Patient slept well during night. No prn medications given. Will CTM.

## 2019-06-18 NOTE — PROGRESS NOTES
LOS: 2 days     Name: Vita Ren  Age/Sex: 44 y.o. female  :  1975        PCP: Jase Warner  Chief Complaint   Patient presents with   • Leg Pain     left leg pain starting x1 week ago.    • Shortness of Breath      Subjective   Pain is improved some today still requiring breakthrough pain medicine but has responded well to long-acting medications.  General: No Fever or Chills, Cardiac: No Chest Pain or Palpitations, Resp: No Cough or SOA, GI: No Nausea, Vomiting, or Diarrhea and Other: No bleeding      budesonide 0.5 mg Nebulization BID - RT   cetirizine 10 mg Oral Daily   docusate sodium 100 mg Oral BID   enoxaparin 40 mg Subcutaneous Q24H   famotidine 40 mg Oral BID AC   gabapentin 100 mg Oral Q12H   hydrocortisone-pramoxine  Rectal Every Other Day   ipratropium 0.5 mg Nebulization TID - RT   lactobacillus acidophilus 1 capsule Oral Daily   lidocaine 1 patch Transdermal Q24H   lubiprostone 8 mcg Oral BID With Meals   mirtazapine 15 mg Oral Nightly   montelukast 10 mg Oral Nightly   Morphine 15 mg Oral Q12H   naproxen 500 mg Oral BID With Meals   norethindrone 0.35 mg Oral Daily   OLANZapine 2.5 mg Oral Nightly   sennosides-docusate sodium 1 tablet Oral Q PM   sodium chloride 3 mL Intravenous Q12H       sodium chloride 50 mL/hr Last Rate: 50 mL/hr (19 1340)       Objective   Vital Signs  Temp:  [97 °F (36.1 °C)-97.9 °F (36.6 °C)] 97.1 °F (36.2 °C)  Heart Rate:  [] 74  Resp:  [16-18] 16  BP: ()/(63-75) 98/68  Body mass index is 18.32 kg/m².    Intake/Output Summary (Last 24 hours) at 2019 1708  Last data filed at 2019 1817  Gross per 24 hour   Intake 1731 ml   Output --   Net 1731 ml       Physical Exam   Constitutional: She is oriented to person, place, and time. She appears well-developed and well-nourished.   HENT:   Head: Normocephalic and atraumatic.   Cardiovascular: Normal rate, regular rhythm and normal heart sounds.   Abdominal: Soft. Bowel sounds are normal. She  exhibits no distension.   Neurological: She is alert and oriented to person, place, and time.   Skin: Skin is warm and dry.   Psychiatric: She has a normal mood and affect. Her behavior is normal.   Nursing note and vitals reviewed.        Results Review:       I reviewed the patient's new clinical results.  Results from last 7 days   Lab Units 06/16/19  1102 06/12/19  0440 06/11/19  2250   WBC 10*3/mm3 8.97 6.95 8.06   HEMOGLOBIN g/dL 11.0* 9.5* 10.8*   PLATELETS 10*3/mm3 214 232 269     Results from last 7 days   Lab Units 06/16/19  1102 06/12/19  0440 06/11/19  2250   SODIUM mmol/L 143 141 141   POTASSIUM mmol/L 3.9 4.3 4.0   CHLORIDE mmol/L 108* 109* 106   CO2 mmol/L 22.1 23.9 23.4   BUN mg/dL 10 6 7   CREATININE mg/dL 0.62 0.54* 0.77   CALCIUM mg/dL 9.2 7.4* 8.4*   Estimated Creatinine Clearance: 96.1 mL/min (by C-G formula based on SCr of 0.62 mg/dL).      Assessment/Plan     Pain of metastatic malignancy    Colon cancer metastasized to multiple sites including liver, lung, iliac lymph nodes and bone (CMS/HCC)    Left hip pain    L3 vertebral fracture (CMS/HCC)    Constipation    Colorectal cancer, stage IV (CMS/HCC)    Mild persistent asthma with acute exacerbation    Intractable back pain      PLAN  -Appreciate oncology's recommendations for pain medications.  She seems to be responding well to these changes.  Will monitor for another half-life here in the hospital  -Appreciate neurosurgery's recommendations.  Plan is to monitor on pain medications and see how she responds to conservative measures and the patient is going to consider surgical intervention in the outpatient setting  -Continue medications for opiate-induced constipation.    Disposition  To be determined but hopefully home in the next 48 hours      Sanchez Reyes MD  [facility]  06/18/19  5:08 PM

## 2019-06-18 NOTE — PLAN OF CARE
Problem: Fall Risk (Adult)  Goal: Absence of Fall  Outcome: Ongoing (interventions implemented as appropriate)      Problem: Pain, Chronic (Adult)  Goal: Acceptable Pain/Comfort Level and Functional Ability  Outcome: Ongoing (interventions implemented as appropriate)      Problem: Patient Care Overview  Goal: Plan of Care Review  Outcome: Ongoing (interventions implemented as appropriate)   06/18/19 5575   Coping/Psychosocial   Plan of Care Reviewed With patient   Plan of Care Review   Progress improving   OTHER   Outcome Summary Patient reports pain well controlled with current plan. Initially requested roxanol but then declined stating she wanted to make sure she really needed. Enc to take it ahead of activity vs waiting to take it after and having increased pain. She felt anxiety med would be more effective. Orders for ativan received and pt has been instructed by MD not to take it at or around the same time she takes roxanol. Up ad angela. Will continue to monitor

## 2019-06-18 NOTE — PROGRESS NOTES
REASON FOR FOLLOWUP/CHIEF COMPLAINT:  Metastatic colorectal cancer    HISTORY OF PRESENT ILLNESS:   This patient has been admitted to the hospital for severe pain radiating to the left lower extremity for several weeks not controlled with Dilaudid at all. She was taking Dilaudid at home and it was not phasing the pain at all with this medication.  Obviously, the nature of the pain is difficult to know because she has had metastases to the lumbar spine and has had CyberKnife therapy to this area before.  The other issue is the patient has fullness sensation in the pelvis, has some component of difficulty controlling her bladder function.  He has not had any constipation.  Review of the CT scans is very bothersome regarding interpretation.  In any event, I discussed facts regarding management after her physical examination performed today.  Please review below.    On 06/18/2019 the patient feels substantially better after initiation of narcotic rotation yesterday with MS Contin to the point that she has not required the initiation of immediate release morphine. She requested going back onto the Ativan that gives her some break in regard to anxiety issues. Please review below. She had a great night of sleep. She started the Zyprexa as well yesterday. The patient is going to have evaluation by neurosurgery team today in regard her back pain and her issues pertinent to this.       Past Medical History, Past Surgical History, Social History, Family History have been reviewed and are without significant changes except as mentioned.    Review of Systems   Review of Systems   Constitutional: Positive for fatigue. Negative for activity change.   HENT: Negative.  Negative for nosebleeds and trouble swallowing.    Eyes: Negative.    Respiratory: Positive for cough. Negative for shortness of breath and wheezing.    Cardiovascular: Negative.  Negative for chest pain and palpitations.   Gastrointestinal: Positive for  abdominal pain. Negative for constipation, diarrhea and nausea.   Endocrine: Negative.    Genitourinary: Positive for dysuria and pelvic pain. Negative for hematuria.   Musculoskeletal: Positive for back pain. Negative for arthralgias and myalgias.        Radicular pain lle   Skin: Negative.  Negative for rash and wound.   Neurological: Negative.  Negative for seizures and syncope.   Hematological: Negative.  Negative for adenopathy. Does not bruise/bleed easily.   Psychiatric/Behavioral: Negative for confusion. The patient is nervous/anxious.        Medications:  The current medication list was reviewed in the EMR    ALLERGIES:  No Known Allergies           Vitals:    06/18/19 0509 06/18/19 0655 06/18/19 0700 06/18/19 0742   BP: 121/63   110/75   BP Location: Right arm   Left arm   Patient Position: Lying   Lying   Pulse: 82 78 77 67   Resp: 18 18 16 16   Temp: 97 °F (36.1 °C)   97.9 °F (36.6 °C)   TempSrc: Oral   Oral   SpO2: 98% 100% 100% 98%   Weight:       Height:         Physical Exam    CONSTITUTIONAL:  Vital signs reviewed.  No distress, looks comfortable.  EYES:  Conjunctiva and lids unremarkable.  PERRLA  EARS,NOSE,MOUTH,THROAT:  Ears and nose appear unremarkable.  Lips, teeth, gums appear unremarkable.  RESPIRATORY:  Normal respiratory effort.  Lungs clear to auscultation bilaterally.  CARDIOVASCULAR:  Normal S1, S2.  No murmurs rubs or gallops.  No significant lower extremity edema.  GASTROINTESTINAL: Abdomen appears unremarkable.  Nontender.  No hepatomegaly.  No splenomegaly.  NEURO: cranial nerves 2-12 grossly intact.  No focal deficits.  Appears to have equal strength all 4 extremities.  MUSCULOSKELETAL:  Unremarkable digits/nails.  No cyanosis or clubbing.  SKIN:  Warm.  No rashes.  PSYCHIATRIC:  Normal judgment and insight.  Normal mood and affect.    I did a rectal examination on this patient today.  The sphincter tone is completely normal, posterior wall of the rectum is unremarkable, there is no  tenderness in the sacrum, masses or abnormalities.  There is a sensation of fullness toward the front between the rectum, bladder and uterus that is difficult to interpret, irregular, nodular and mildly tender.  The stool was brown, normal color.  No Hemoccult testing was done.  She had normal sphincter tone and no numbness in the perineal area.        RECENT LABS:  WBC   Date Value Ref Range Status   06/16/2019 8.97 3.40 - 10.80 10*3/mm3 Final     Hemoglobin   Date Value Ref Range Status   06/16/2019 11.0 (L) 12.0 - 15.9 g/dL Final     Platelets   Date Value Ref Range Status   06/16/2019 214 140 - 450 10*3/mm3 Final       ASSESSMENT/PLAN:      *Metastatic colorectal cancer, diagnosed 2011.  Receives treatment through the [facility].  Last treatment was FOLFIRI Erbitux.  States she did not did not tolerate Erbitux and therefore Erbitux was dropped with plans to resume FOLFIRI this month.     *LLQ abdominal pain rating to the left back and left leg.  MRI has been ordered by hospitalist service.  I will also order a CT of the abdomen and pelvis to evaluate this.  This could be related to her constipation issues.  She states she had a CT at the [facility] last Thursday and is scheduled for a PET scan at the [facility] this Monday.    CT AP and MRI L-spine performed here to look for an acute problem.  · CT AP 6/12/2019, from 3/28/2019: New small pleural effusions with increase in suspected basilar pulmonary metastatic disease.  Decrease in circumscribed perirectal fluid collection 3.7 x 1.9 cm, from 7.8 x 5.7 cm.  No clear etiology for the pain.  Mild constipation.  · CT images personally viewed by me.  · MRI L-spine 6/12/2019: L2 and L3 metastasis with pathologic compression fracture of L3 no significant encroachment of spinal canal or neural foramina at any level in the lumbar spine.  · Left hip x-ray 6/12/2019: Negative.  · Therefore, no acute cause of the pain is found on CT  AP or MRI L-spine.  Perhaps the pain is related to her mild constipation.  · She would like to have a colonoscopy to evaluate the pain.  CT scan shows no obstruction.  I suspect this could be done as an outpatient but would defer this to the hospitalist service.       *Chronic constipation.                   Change Senokot-S to schedule II tablets nightly.     Plan  After a lengthy discussion with the patient today analyzing the pain she has, it is difficult to determine how much of the pain is pertinent to metastases to the bone and how much is pertinent to disease in the pelvis.  In my opinion, CT scan of the abdomen is very abnormal and to me there is a soft tissue in the pelvis that could be implying some component of pain.  The patient’s radicular component is into the left lower extremity.  There is no obvious motor or sensory deficit as far as I can tell, but it caught my attention in that regard.      The patient feels substantially better after the initiation of MS Contin. She has not required any immediate release morphine. She has requested Ativan for anxiety and that will be okay as long as long as she has the precaution to use the Ativan away from any dose of immediate release morphine.     From my point of view I think another 24 hours will be necessary to  the benefit of the plan of pain that she has at this time. If that is better she will be able to go home tomorrow and followup with her PET scan at the [facility]. They will request an appointment to see us in the future if she wishes.     Other than that I have nothing to add to her care today.     I discussed the case with the nurse taking care of the patient.

## 2019-06-18 NOTE — PLAN OF CARE
Problem: Patient Care Overview  Goal: Plan of Care Review  Continue with neb tx as ordered by physician

## 2019-06-19 PROCEDURE — 94799 UNLISTED PULMONARY SVC/PX: CPT

## 2019-06-19 PROCEDURE — 25010000002 ENOXAPARIN PER 10 MG: Performed by: INTERNAL MEDICINE

## 2019-06-19 PROCEDURE — 99233 SBSQ HOSP IP/OBS HIGH 50: CPT | Performed by: INTERNAL MEDICINE

## 2019-06-19 RX ORDER — MORPHINE SULFATE 20 MG/ML
20 SOLUTION ORAL
Status: DISCONTINUED | OUTPATIENT
Start: 2019-06-19 | End: 2019-06-22 | Stop reason: HOSPADM

## 2019-06-19 RX ORDER — MORPHINE SULFATE 2 MG/ML
2 INJECTION, SOLUTION INTRAMUSCULAR; INTRAVENOUS EVERY 4 HOURS PRN
Status: DISCONTINUED | OUTPATIENT
Start: 2019-06-19 | End: 2019-06-22 | Stop reason: HOSPADM

## 2019-06-19 RX ORDER — SENNA AND DOCUSATE SODIUM 50; 8.6 MG/1; MG/1
2 TABLET, FILM COATED ORAL EVERY EVENING
Status: DISCONTINUED | OUTPATIENT
Start: 2019-06-19 | End: 2019-06-20

## 2019-06-19 RX ADMIN — SODIUM CHLORIDE, PRESERVATIVE FREE 3 ML: 5 INJECTION INTRAVENOUS at 21:04

## 2019-06-19 RX ADMIN — MIRTAZAPINE 15 MG: 15 TABLET, FILM COATED ORAL at 21:00

## 2019-06-19 RX ADMIN — ENOXAPARIN SODIUM 40 MG: 40 INJECTION SUBCUTANEOUS at 17:40

## 2019-06-19 RX ADMIN — MONTELUKAST SODIUM 10 MG: 10 TABLET, FILM COATED ORAL at 21:03

## 2019-06-19 RX ADMIN — IPRATROPIUM BROMIDE 0.5 MG: 0.5 SOLUTION RESPIRATORY (INHALATION) at 14:26

## 2019-06-19 RX ADMIN — DOCUSATE SODIUM 100 MG: 100 CAPSULE, LIQUID FILLED ORAL at 09:00

## 2019-06-19 RX ADMIN — FAMOTIDINE 40 MG: 40 TABLET, FILM COATED ORAL at 06:22

## 2019-06-19 RX ADMIN — MORPHINE SULFATE 20 MG: 100 SOLUTION ORAL at 19:06

## 2019-06-19 RX ADMIN — ACETAMINOPHEN AND CODEINE PHOSPHATE 0.35 MG: 120; 12 SOLUTION ORAL at 21:10

## 2019-06-19 RX ADMIN — MORPHINE SULFATE 10 MG: 100 SOLUTION ORAL at 10:44

## 2019-06-19 RX ADMIN — CALCIUM CARBONATE-VITAMIN D TAB 500 MG-200 UNIT 1000 MG: 500-200 TAB at 15:44

## 2019-06-19 RX ADMIN — Medication 1 CAPSULE: at 09:00

## 2019-06-19 RX ADMIN — SODIUM CHLORIDE 50 ML/HR: 9 INJECTION, SOLUTION INTRAVENOUS at 17:41

## 2019-06-19 RX ADMIN — IPRATROPIUM BROMIDE 0.5 MG: 0.5 SOLUTION RESPIRATORY (INHALATION) at 07:09

## 2019-06-19 RX ADMIN — SODIUM CHLORIDE, PRESERVATIVE FREE 3 ML: 5 INJECTION INTRAVENOUS at 09:01

## 2019-06-19 RX ADMIN — SENNOSIDES,DOCUSATE SODIUM 2 TABLET: 50; 8.6 TABLET, FILM COATED ORAL at 16:53

## 2019-06-19 RX ADMIN — MORPHINE SULFATE 15 MG: 15 TABLET, FILM COATED, EXTENDED RELEASE ORAL at 21:05

## 2019-06-19 RX ADMIN — GABAPENTIN 100 MG: 100 CAPSULE ORAL at 21:04

## 2019-06-19 RX ADMIN — LUBIPROSTONE 8 MCG: 8 CAPSULE, GELATIN COATED ORAL at 09:00

## 2019-06-19 RX ADMIN — IPRATROPIUM BROMIDE 0.5 MG: 0.5 SOLUTION RESPIRATORY (INHALATION) at 20:04

## 2019-06-19 RX ADMIN — LORAZEPAM 0.5 MG: 0.5 TABLET ORAL at 09:45

## 2019-06-19 RX ADMIN — FAMOTIDINE 40 MG: 40 TABLET, FILM COATED ORAL at 16:53

## 2019-06-19 RX ADMIN — NAPROXEN 500 MG: 500 TABLET ORAL at 09:00

## 2019-06-19 RX ADMIN — BUDESONIDE 0.5 MG: 0.5 INHALANT RESPIRATORY (INHALATION) at 07:08

## 2019-06-19 RX ADMIN — CETIRIZINE HYDROCHLORIDE 10 MG: 10 TABLET, FILM COATED ORAL at 21:00

## 2019-06-19 RX ADMIN — LIDOCAINE 1 PATCH: 50 PATCH TOPICAL at 14:14

## 2019-06-19 RX ADMIN — LUBIPROSTONE 8 MCG: 8 CAPSULE, GELATIN COATED ORAL at 17:40

## 2019-06-19 RX ADMIN — MORPHINE SULFATE 15 MG: 15 TABLET, FILM COATED, EXTENDED RELEASE ORAL at 09:00

## 2019-06-19 RX ADMIN — MORPHINE SULFATE 10 MG: 100 SOLUTION ORAL at 14:12

## 2019-06-19 RX ADMIN — NAPROXEN 500 MG: 500 TABLET ORAL at 17:40

## 2019-06-19 RX ADMIN — DOCUSATE SODIUM 100 MG: 100 CAPSULE, LIQUID FILLED ORAL at 21:10

## 2019-06-19 RX ADMIN — GABAPENTIN 100 MG: 100 CAPSULE ORAL at 09:01

## 2019-06-19 RX ADMIN — BUDESONIDE 0.5 MG: 0.5 INHALANT RESPIRATORY (INHALATION) at 20:04

## 2019-06-19 NOTE — PLAN OF CARE
Problem: Patient Care Overview  Goal: Plan of Care Review  Outcome: Ongoing (interventions implemented as appropriate)   06/19/19 0607   Coping/Psychosocial   Plan of Care Reviewed With patient   Plan of Care Review   Progress improving   OTHER   Outcome Summary No changes during night. VSS. No complaints of pain. Slept well during night. Will CTM.

## 2019-06-19 NOTE — PLAN OF CARE
Problem: Fall Risk (Adult)  Goal: Absence of Fall  Outcome: Ongoing (interventions implemented as appropriate)      Problem: Pain, Chronic (Adult)  Goal: Acceptable Pain/Comfort Level and Functional Ability  Outcome: Ongoing (interventions implemented as appropriate)      Problem: Patient Care Overview  Goal: Plan of Care Review  Outcome: Ongoing (interventions implemented as appropriate)   06/19/19 0607 06/19/19 1531   Coping/Psychosocial   Plan of Care Reviewed With patient --    Plan of Care Review   Progress --  no change   OTHER   Outcome Summary --  Patient with increased pain today. She attributes to having had a BM. Medicated with ativan and roxanol. Roxanol increased from 10-20mg. New order for IV morphine for severe pain but pt does not want to use. She wants to use the medications she will be using and managing at home. Up ad angela. continues on IVF. Will continue to monitor

## 2019-06-19 NOTE — PROGRESS NOTES
REASON FOR FOLLOWUP/CHIEF COMPLAINT:  Metastatic colorectal cancer    HISTORY OF PRESENT ILLNESS:   This patient has been admitted to the hospital for severe pain radiating to the left lower extremity for several weeks not controlled with Dilaudid at all. She was taking Dilaudid at home and it was not phasing the pain at all with this medication.  Obviously, the nature of the pain is difficult to know because she has had metastases to the lumbar spine and has had CyberKnife therapy to this area before.  The other issue is the patient has fullness sensation in the pelvis, has some component of difficulty controlling her bladder function.  He has not had any constipation.  Review of the CT scans is very bothersome regarding interpretation.  In any event, I discussed facts regarding management after her physical examination performed today.  Please review below.    On 06/18/2019 the patient feels substantially better after initiation of narcotic rotation yesterday with MS Contin to the point that she has not required the initiation of immediate release morphine. She requested going back onto the Ativan that gives her some break in regard to anxiety issues. Please review below. She had a great night of sleep. She started the Zyprexa as well yesterday. The patient is going to have evaluation by neurosurgery team today in regard her back pain and her issues pertinent to this.       Past Medical History, Past Surgical History, Social History, Family History have been reviewed and are without significant changes except as mentioned.    Review of Systems   Review of Systems   Constitutional: Positive for fatigue. Negative for activity change.   HENT: Negative.  Negative for nosebleeds and trouble swallowing.    Eyes: Negative.    Respiratory: Negative for cough, shortness of breath and wheezing.    Cardiovascular: Negative.  Negative for chest pain and palpitations.   Gastrointestinal: Positive for abdominal pain.  Negative for constipation, diarrhea and nausea.   Endocrine: Negative.    Genitourinary: Positive for pelvic pain. Negative for dysuria and hematuria.   Musculoskeletal: Positive for back pain. Negative for arthralgias and myalgias.        Radicular pain lle   Skin: Negative.  Negative for rash and wound.   Neurological: Negative.  Negative for seizures and syncope.   Hematological: Negative.  Negative for adenopathy. Does not bruise/bleed easily.   Psychiatric/Behavioral: Negative for confusion. The patient is nervous/anxious.        Medications:  The current medication list was reviewed in the EMR    ALLERGIES:  No Known Allergies           Vitals:    06/19/19 0723 06/19/19 1426 06/19/19 1433 06/19/19 1518   BP: 117/76   104/71   BP Location: Left arm   Left arm   Patient Position: Lying   Lying   Pulse: 67 65 66 82   Resp: 16 16 17 16   Temp: 98 °F (36.7 °C)   98.7 °F (37.1 °C)   TempSrc: Oral   Oral   SpO2: 97% 96% 97% 98%   Weight:       Height:         Physical Exam    CONSTITUTIONAL:  Vital signs reviewed.  No distress, looks comfortable.  EYES:  Conjunctiva and lids unremarkable.  PERRLA  EARS,NOSE,MOUTH,THROAT:  Ears and nose appear unremarkable.  Lips, teeth, gums appear unremarkable.  RESPIRATORY:  Normal respiratory effort.  Lungs clear to auscultation bilaterally.  CARDIOVASCULAR:  Normal S1, S2.  No murmurs rubs or gallops.  No significant lower extremity edema.  GASTROINTESTINAL: Abdomen appears unremarkable.  Nontender.  No hepatomegaly.  No splenomegaly.  NEURO: cranial nerves 2-12 grossly intact.  No focal deficits.  Appears to have equal strength all 4 extremities.  MUSCULOSKELETAL:  Unremarkable digits/nails.  No cyanosis or clubbing.  SKIN:  Warm.  No rashes.  PSYCHIATRIC:  Normal judgment and insight.  Normal mood and affect.            RECENT LABS:  No results found for: WBC, HGB, PLT    ASSESSMENT/PLAN:      *Metastatic colorectal cancer, diagnosed 2011.  Receives treatment through the  [facility].  Last treatment was FOLFIRI Erbitux.  States she did not did not tolerate Erbitux and therefore Erbitux was dropped with plans to resume FOLFIRI this month.     *LLQ abdominal pain rating to the left back and left leg.  MRI has been ordered by hospitalist service.  I will also order a CT of the abdomen and pelvis to evaluate this.  This could be related to her constipation issues.  She states she had a CT at the [facility] last Thursday and is scheduled for a PET scan at the [facility] this Monday.    CT AP and MRI L-spine performed here to look for an acute problem.  · CT AP 6/12/2019, from 3/28/2019: New small pleural effusions with increase in suspected basilar pulmonary metastatic disease.  Decrease in circumscribed perirectal fluid collection 3.7 x 1.9 cm, from 7.8 x 5.7 cm.  No clear etiology for the pain.  Mild constipation.  · CT images personally viewed by me.  · MRI L-spine 6/12/2019: L2 and L3 metastasis with pathologic compression fracture of L3 no significant encroachment of spinal canal or neural foramina at any level in the lumbar spine.  · Left hip x-ray 6/12/2019: Negative.  · Therefore, no acute cause of the pain is found on CT AP or MRI L-spine.  Perhaps the pain is related to her mild constipation.  · She would like to have a colonoscopy to evaluate the pain.  CT scan shows no obstruction.  I suspect this could be done as an outpatient but would defer this to the hospitalist service.       *Chronic constipation.                   Change Senokot-S to schedule II tablets nightly.     Plan  After a lengthy discussion with the patient today analyzing the pain she has, it is difficult to determine how much of the pain is pertinent to metastases to the bone and how much is pertinent to disease in the pelvis.  In my opinion, CT scan of the abdomen is very abnormal and to me there is a soft tissue in the pelvis that could be implying some component of  pain.  The patient’s radicular component is into the left lower extremity.  There is no obvious motor or sensory deficit as far as I can tell, but it caught my attention in that regard.      The patient feels substantially better after the initiation of MS Contin. She has not required any immediate release morphine. She has requested Ativan for anxiety and that will be okay as long as long as she has the precaution to use the Ativan away from any dose of immediate release morphine.     From my point of view I think another 24 hours will be necessary to  the benefit of the plan of pain that she has at this time. If that is better she will be able to go home tomorrow and followup with her PET scan at the [facility]. They will request an appointment to see us in the future if she wishes.     Other than that I have nothing to add to her care today.     I discussed the case with the nurse taking care of the patient.     On 06/19/2019, I discussed with the patient the need to raise the dose of immediate release morphine for breakthrough pain if necessary to keep her from having further discomfort after defecation.  I still believe that, radiologically speaking, there is an abnormality in the pelvis that has not been addressed in the proper way.  This was documented through my rectal examination a few days ago.      In any event, I do believe that the patient would benefit from neurosurgery visit and procedure to try to correct the abnormality in her back that could be pertinent regarding helping her control pain better.  In the meantime, the regimen she is taking is agreeing with her and we will continue this for the time being.      I discussed with her the CEA level that was done yesterday that was normal and further discussing the issue this test has always been normal since the time of her diagnosis.  Therefore, this is one of the few patients whose CEA level is not useful for followup regarding the  process pertinent to colon cancer.      The patient will further review analysis of PD-L1 on the tissue from the rectum of the original tumor and we need to do analysis of that and we will proceed in that arena.  She is also willing to go to [facility] in [place] regarding further assessment after she has her PET scan next week.

## 2019-06-19 NOTE — PROGRESS NOTES
LOS: 3 days     Name: Vita Ren  Age/Sex: 44 y.o. female  :  1975        PCP: Jase Warner  Chief Complaint   Patient presents with   • Leg Pain     left leg pain starting x1 week ago.    • Shortness of Breath      Subjective   Pain is improved some today still requiring breakthrough pain medicine but has responded well to long-acting medications.  Remains concerned about follow-up care and need for opiates.  General: No Fever or Chills, Cardiac: No Chest Pain or Palpitations, Resp: No Cough or SOA, GI: No Nausea, Vomiting, or Diarrhea and Other: No bleeding      budesonide 0.5 mg Nebulization BID - RT   cetirizine 10 mg Oral Daily   docusate sodium 100 mg Oral BID   enoxaparin 40 mg Subcutaneous Q24H   famotidine 40 mg Oral BID AC   gabapentin 100 mg Oral Q12H   hydrocortisone-pramoxine  Rectal Every Other Day   ipratropium 0.5 mg Nebulization TID - RT   lactobacillus acidophilus 1 capsule Oral Daily   lidocaine 1 patch Transdermal Q24H   lubiprostone 8 mcg Oral BID With Meals   mirtazapine 15 mg Oral Nightly   montelukast 10 mg Oral Nightly   Morphine 15 mg Oral Q12H   naproxen 500 mg Oral BID With Meals   norethindrone 0.35 mg Oral Daily   OLANZapine 2.5 mg Oral Nightly   sennosides-docusate sodium 1 tablet Oral Q PM   sodium chloride 3 mL Intravenous Q12H       sodium chloride 50 mL/hr Last Rate: 50 mL/hr (19)       Objective   Vital Signs  Temp:  [96.5 °F (35.8 °C)-98.1 °F (36.7 °C)] 98 °F (36.7 °C)  Heart Rate:  [57-86] 67  Resp:  [16-20] 16  BP: ()/(60-88) 117/76  Body mass index is 18.32 kg/m².    Intake/Output Summary (Last 24 hours) at 2019 1249  Last data filed at 2019 0634  Gross per 24 hour   Intake 2168 ml   Output 550 ml   Net 1618 ml       Physical Exam   Constitutional: She is oriented to person, place, and time. She appears well-developed and well-nourished.   HENT:   Head: Normocephalic and atraumatic.   Cardiovascular: Normal rate, regular rhythm and  normal heart sounds.   Abdominal: Soft. Bowel sounds are normal. She exhibits no distension.   Neurological: She is alert and oriented to person, place, and time.   Skin: Skin is warm and dry.   Psychiatric: She has a normal mood and affect. Her behavior is normal.   Nursing note and vitals reviewed.        Results Review:       I reviewed the patient's new clinical results.  Results from last 7 days   Lab Units 06/16/19  1102   WBC 10*3/mm3 8.97   HEMOGLOBIN g/dL 11.0*   PLATELETS 10*3/mm3 214     Results from last 7 days   Lab Units 06/16/19  1102   SODIUM mmol/L 143   POTASSIUM mmol/L 3.9   CHLORIDE mmol/L 108*   CO2 mmol/L 22.1   BUN mg/dL 10   CREATININE mg/dL 0.62   CALCIUM mg/dL 9.2   Estimated Creatinine Clearance: 96.1 mL/min (by C-G formula based on SCr of 0.62 mg/dL).      Assessment/Plan     Pain of metastatic malignancy    Colon cancer metastasized to multiple sites including liver, lung, iliac lymph nodes and bone (CMS/HCC)    Left hip pain    L3 vertebral fracture (CMS/HCC)    Constipation    Colorectal cancer, stage IV (CMS/HCC)    Mild persistent asthma with acute exacerbation    Intractable back pain      PLAN  -Appreciate oncology's recommendations for pain medications.  She seems to be responding well to these changes.  His pain medications seem to be working appropriately.  Plan to discharge with 2 weeks of medications to follow-up with her oncologist for additional narcotic prescriptions  -Appreciate neurosurgery's recommendations.  She is weighing her options of proceeding with kyphoplasty during this admission also postponing to left extremity.  She is going to discuss this with Dr. Romero today.  -Continue medications for opiate-induced constipation.      Disposition  May be home this afternoon unless she decides to have kyphoplasty done at that point we can determine at this time repair here in the hospital.      Sanchez Reyes MD  [facility]  06/19/19  12:49  PM

## 2019-06-19 NOTE — TELEPHONE ENCOUNTER
Called [facility] at [phone] spoke with Dat and requested pt recent ct scan and oncology notes be faxed to us at [phone].

## 2019-06-20 PROBLEM — S32.039A CLOSED FRACTURE OF THIRD LUMBAR VERTEBRA (HCC): Status: ACTIVE | Noted: 2019-06-16

## 2019-06-20 LAB
ABO GROUP BLD: NORMAL
BLD GP AB SCN SERPL QL: NEGATIVE
INR PPP: 1.06 (ref 0.9–1.1)
PROTHROMBIN TIME: 13.5 SECONDS (ref 11.7–14.2)
RH BLD: POSITIVE
T&S EXPIRATION DATE: NORMAL

## 2019-06-20 PROCEDURE — 86900 BLOOD TYPING SEROLOGIC ABO: CPT | Performed by: NEUROLOGICAL SURGERY

## 2019-06-20 PROCEDURE — 86850 RBC ANTIBODY SCREEN: CPT | Performed by: NEUROLOGICAL SURGERY

## 2019-06-20 PROCEDURE — 94799 UNLISTED PULMONARY SVC/PX: CPT

## 2019-06-20 PROCEDURE — 86901 BLOOD TYPING SEROLOGIC RH(D): CPT | Performed by: NEUROLOGICAL SURGERY

## 2019-06-20 PROCEDURE — 85610 PROTHROMBIN TIME: CPT | Performed by: PHYSICIAN ASSISTANT

## 2019-06-20 PROCEDURE — 99232 SBSQ HOSP IP/OBS MODERATE 35: CPT | Performed by: PHYSICIAN ASSISTANT

## 2019-06-20 PROCEDURE — 99233 SBSQ HOSP IP/OBS HIGH 50: CPT | Performed by: INTERNAL MEDICINE

## 2019-06-20 RX ORDER — SENNA AND DOCUSATE SODIUM 50; 8.6 MG/1; MG/1
2 TABLET, FILM COATED ORAL 2 TIMES DAILY
Status: DISCONTINUED | OUTPATIENT
Start: 2019-06-20 | End: 2019-06-22 | Stop reason: HOSPADM

## 2019-06-20 RX ORDER — CEFAZOLIN SODIUM 2 G/100ML
2 INJECTION, SOLUTION INTRAVENOUS ONCE
Status: COMPLETED | OUTPATIENT
Start: 2019-06-20 | End: 2019-06-21

## 2019-06-20 RX ORDER — NAPROXEN 500 MG/1
500 TABLET ORAL EVERY 8 HOURS
Status: DISCONTINUED | OUTPATIENT
Start: 2019-06-20 | End: 2019-06-22 | Stop reason: HOSPADM

## 2019-06-20 RX ADMIN — IPRATROPIUM BROMIDE 0.5 MG: 0.5 SOLUTION RESPIRATORY (INHALATION) at 19:35

## 2019-06-20 RX ADMIN — DOCUSATE SODIUM 100 MG: 100 CAPSULE, LIQUID FILLED ORAL at 08:52

## 2019-06-20 RX ADMIN — GABAPENTIN 100 MG: 100 CAPSULE ORAL at 21:40

## 2019-06-20 RX ADMIN — LIDOCAINE 1 PATCH: 50 PATCH TOPICAL at 10:06

## 2019-06-20 RX ADMIN — DOCUSATE SODIUM 100 MG: 100 CAPSULE, LIQUID FILLED ORAL at 21:40

## 2019-06-20 RX ADMIN — SENNOSIDES,DOCUSATE SODIUM 2 TABLET: 50; 8.6 TABLET, FILM COATED ORAL at 12:27

## 2019-06-20 RX ADMIN — OLANZAPINE 2.5 MG: 2.5 TABLET, FILM COATED ORAL at 21:30

## 2019-06-20 RX ADMIN — ACETAMINOPHEN AND CODEINE PHOSPHATE 0.35 MG: 120; 12 SOLUTION ORAL at 21:30

## 2019-06-20 RX ADMIN — LUBIPROSTONE 8 MCG: 8 CAPSULE, GELATIN COATED ORAL at 08:52

## 2019-06-20 RX ADMIN — Medication 1 CAPSULE: at 08:52

## 2019-06-20 RX ADMIN — MORPHINE SULFATE 15 MG: 15 TABLET, FILM COATED, EXTENDED RELEASE ORAL at 21:40

## 2019-06-20 RX ADMIN — BUDESONIDE 0.5 MG: 0.5 INHALANT RESPIRATORY (INHALATION) at 19:35

## 2019-06-20 RX ADMIN — NAPROXEN 500 MG: 500 TABLET ORAL at 08:52

## 2019-06-20 RX ADMIN — BUDESONIDE 0.5 MG: 0.5 INHALANT RESPIRATORY (INHALATION) at 07:22

## 2019-06-20 RX ADMIN — CALCIUM CARBONATE-VITAMIN D TAB 500 MG-200 UNIT 1000 MG: 500-200 TAB at 08:52

## 2019-06-20 RX ADMIN — MONTELUKAST SODIUM 10 MG: 10 TABLET, FILM COATED ORAL at 21:30

## 2019-06-20 RX ADMIN — IPRATROPIUM BROMIDE 0.5 MG: 0.5 SOLUTION RESPIRATORY (INHALATION) at 15:48

## 2019-06-20 RX ADMIN — POLYETHYLENE GLYCOL 3350 17 G: 17 POWDER, FOR SOLUTION ORAL at 08:52

## 2019-06-20 RX ADMIN — POLYETHYLENE GLYCOL 3350 17 G: 17 POWDER, FOR SOLUTION ORAL at 20:18

## 2019-06-20 RX ADMIN — FAMOTIDINE 40 MG: 40 TABLET, FILM COATED ORAL at 18:04

## 2019-06-20 RX ADMIN — SENNOSIDES,DOCUSATE SODIUM 2 TABLET: 50; 8.6 TABLET, FILM COATED ORAL at 21:40

## 2019-06-20 RX ADMIN — IPRATROPIUM BROMIDE 0.5 MG: 0.5 SOLUTION RESPIRATORY (INHALATION) at 07:22

## 2019-06-20 RX ADMIN — GABAPENTIN 100 MG: 100 CAPSULE ORAL at 08:52

## 2019-06-20 RX ADMIN — CETIRIZINE HYDROCHLORIDE 10 MG: 10 TABLET, FILM COATED ORAL at 21:30

## 2019-06-20 RX ADMIN — SODIUM CHLORIDE, PRESERVATIVE FREE 3 ML: 5 INJECTION INTRAVENOUS at 21:40

## 2019-06-20 RX ADMIN — MORPHINE SULFATE 15 MG: 15 TABLET, FILM COATED, EXTENDED RELEASE ORAL at 08:52

## 2019-06-20 RX ADMIN — SODIUM CHLORIDE, PRESERVATIVE FREE 3 ML: 5 INJECTION INTRAVENOUS at 08:52

## 2019-06-20 RX ADMIN — NAPROXEN 500 MG: 500 TABLET ORAL at 16:04

## 2019-06-20 RX ADMIN — FAMOTIDINE 40 MG: 40 TABLET, FILM COATED ORAL at 08:52

## 2019-06-20 RX ADMIN — SODIUM CHLORIDE 50 ML/HR: 9 INJECTION, SOLUTION INTRAVENOUS at 04:54

## 2019-06-20 RX ADMIN — MIRTAZAPINE 15 MG: 15 TABLET, FILM COATED ORAL at 21:30

## 2019-06-20 RX ADMIN — LUBIPROSTONE 8 MCG: 8 CAPSULE, GELATIN COATED ORAL at 18:04

## 2019-06-20 NOTE — PLAN OF CARE
Problem: Fall Risk (Adult)  Goal: Absence of Fall  Outcome: Ongoing (interventions implemented as appropriate)      Problem: Pain, Chronic (Adult)  Goal: Acceptable Pain/Comfort Level and Functional Ability  Outcome: Ongoing (interventions implemented as appropriate)      Problem: Patient Care Overview  Goal: Plan of Care Review  Outcome: Ongoing (interventions implemented as appropriate)   06/20/19 0642   Coping/Psychosocial   Plan of Care Reviewed With patient   Plan of Care Review   Progress no change   OTHER   Outcome Summary Patient slept well tonight. Vital signs remain stable. Patient's fluids continue. Patient very tearful this morning and RN spoke to patient at length about her feelings about her diagnosis, and supported patient with active listening. Patient's pain well controlled tonight and pain had no need for PRN pain medication. Patient up ad angela. Continue to monitor closely, continue with current plan of care.

## 2019-06-20 NOTE — PROGRESS NOTES
REASON FOR FOLLOWUP/CHIEF COMPLAINT:  Metastatic colorectal cancer    HISTORY OF PRESENT ILLNESS:   This patient has been admitted to the hospital for severe pain radiating to the left lower extremity for several weeks not controlled with Dilaudid at all. She was taking Dilaudid at home and it was not phasing the pain at all with this medication.  Obviously, the nature of the pain is difficult to know because she has had metastases to the lumbar spine and has had CyberKnife therapy to this area before.  The other issue is the patient has fullness sensation in the pelvis, has some component of difficulty controlling her bladder function.  He has not had any constipation.  Review of the CT scans is very bothersome regarding interpretation.  In any event, I discussed facts regarding management after her physical examination performed today.  Please review below.    On 06/18/2019 the patient feels substantially better after initiation of narcotic rotation yesterday with MS Contin to the point that she has not required the initiation of immediate release morphine. She requested going back onto the Ativan that gives her some break in regard to anxiety issues. Please review below. She had a great night of sleep. She started the Zyprexa as well yesterday. The patient is going to have evaluation by neurosurgery team today in regard her back pain and her issues pertinent to this.       Past Medical History, Past Surgical History, Social History, Family History have been reviewed and are without significant changes except as mentioned.    Review of Systems   Review of Systems   Constitutional: Positive for fatigue. Negative for activity change.   HENT: Negative.  Negative for nosebleeds and trouble swallowing.    Eyes: Negative.    Respiratory: Negative for cough, shortness of breath and wheezing.    Cardiovascular: Negative.  Negative for chest pain and palpitations.   Gastrointestinal: Positive for abdominal pain.  Negative for constipation, diarrhea and nausea.   Endocrine: Negative.    Genitourinary: Positive for pelvic pain. Negative for dysuria and hematuria.   Musculoskeletal: Positive for back pain. Negative for arthralgias and myalgias.        Radicular pain lle   Skin: Negative.  Negative for rash and wound.   Neurological: Negative.  Negative for seizures and syncope.   Hematological: Negative.  Negative for adenopathy. Does not bruise/bleed easily.   Psychiatric/Behavioral: Negative for confusion. The patient is not nervous/anxious.        Medications:  The current medication list was reviewed in the EMR    ALLERGIES:  No Known Allergies           Vitals:    06/20/19 0440 06/20/19 0722 06/20/19 0732 06/20/19 0828   BP: 122/79   124/80   BP Location: Left arm   Left arm   Patient Position: Lying   Lying   Pulse: 88 72 70 67   Resp: 16 16 16 16   Temp: 96.5 °F (35.8 °C)   97 °F (36.1 °C)   TempSrc: Oral   Oral   SpO2: 98%   97%   Weight:       Height:         Physical Exam    CONSTITUTIONAL:  Vital signs reviewed.  No distress, looks comfortable.  EYES:  Conjunctiva and lids unremarkable.  PERRLA  EARS,NOSE,MOUTH,THROAT:  Ears and nose appear unremarkable.  Lips, teeth, gums appear unremarkable.  RESPIRATORY:  Normal respiratory effort.  Lungs clear to auscultation bilaterally.  CARDIOVASCULAR:  Normal S1, S2.  No murmurs rubs or gallops.  No significant lower extremity edema.  GASTROINTESTINAL: Abdomen appears unremarkable.  Nontender.  No hepatomegaly.  No splenomegaly.  NEURO: cranial nerves 2-12 grossly intact.  No focal deficits.  Appears to have equal strength all 4 extremities.  MUSCULOSKELETAL:  Unremarkable digits/nails.  No cyanosis or clubbing.  SKIN:  Warm.  No rashes.  PSYCHIATRIC:  Normal judgment and insight.  Normal mood and affect.            RECENT LABS:  No results found for: WBC, HGB, PLT    ASSESSMENT/PLAN:      *Metastatic colorectal cancer, diagnosed 2011.  Receives treatment through the  [facility].  Last treatment was FOLFIRI Erbitux.  States she did not did not tolerate Erbitux and therefore Erbitux was dropped with plans to resume FOLFIRI this month.     *LLQ abdominal pain rating to the left back and left leg.  MRI has been ordered by hospitalist service.  I will also order a CT of the abdomen and pelvis to evaluate this.  This could be related to her constipation issues.  She states she had a CT at the [facility] last Thursday and is scheduled for a PET scan at the [facility] this Monday.    CT AP and MRI L-spine performed here to look for an acute problem.  · CT AP 6/12/2019, from 3/28/2019: New small pleural effusions with increase in suspected basilar pulmonary metastatic disease.  Decrease in circumscribed perirectal fluid collection 3.7 x 1.9 cm, from 7.8 x 5.7 cm.  No clear etiology for the pain.  Mild constipation.  · CT images personally viewed by me.  · MRI L-spine 6/12/2019: L2 and L3 metastasis with pathologic compression fracture of L3 no significant encroachment of spinal canal or neural foramina at any level in the lumbar spine.  · Left hip x-ray 6/12/2019: Negative.  · Therefore, no acute cause of the pain is found on CT AP or MRI L-spine.  Perhaps the pain is related to her mild constipation.  · She would like to have a colonoscopy to evaluate the pain.  CT scan shows no obstruction.  I suspect this could be done as an outpatient but would defer this to the hospitalist service.       *Chronic constipation.                   Change Senokot-S to schedule II tablets nightly.     Plan  After a lengthy discussion with the patient today analyzing the pain she has, it is difficult to determine how much of the pain is pertinent to metastases to the bone and how much is pertinent to disease in the pelvis.  In my opinion, CT scan of the abdomen is very abnormal and to me there is a soft tissue in the pelvis that could be implying some component of  pain.  The patient’s radicular component is into the left lower extremity.  There is no obvious motor or sensory deficit as far as I can tell, but it caught my attention in that regard.      The patient feels substantially better after the initiation of MS Contin. She has not required any immediate release morphine. She has requested Ativan for anxiety and that will be okay as long as long as she has the precaution to use the Ativan away from any dose of immediate release morphine.     From my point of view I think another 24 hours will be necessary to  the benefit of the plan of pain that she has at this time. If that is better she will be able to go home tomorrow and followup with her PET scan at the [facility]. They will request an appointment to see us in the future if she wishes.     Other than that I have nothing to add to her care today.     I discussed the case with the nurse taking care of the patient.     On 06/19/2019, I discussed with the patient the need to raise the dose of immediate release morphine for breakthrough pain if necessary to keep her from having further discomfort after defecation.  I still believe that, radiologically speaking, there is an abnormality in the pelvis that has not been addressed in the proper way.  This was documented through my rectal examination a few days ago.      In any event, I do believe that the patient would benefit from neurosurgery visit and procedure to try to correct the abnormality in her back that could be pertinent regarding helping her control pain better.  In the meantime, the regimen she is taking is agreeing with her and we will continue this for the time being.      I discussed with her the CEA level that was done yesterday that was normal and further discussing the issue this test has always been normal since the time of her diagnosis.  Therefore, this is one of the few patients whose CEA level is not useful for followup regarding the  process pertinent to colon cancer.      The patient will further review analysis of PD-L1 on the tissue from the rectum of the original tumor and we need to do analysis of that and we will proceed in that arena.  She is also willing to go to [facility] in [place] regarding further assessment after she has her PET scan next week.    At this time I have nothing to add to the patient's care. Again it will be up to neurosurgery in regard any other procedures for her back pain. The patient will remain on the pain medicine upon discharge. I am not planning to follow the patient after this she has her oncologist at [facility] and she will followup with them after the PET scan.     We will try to work into her local hospital in [facility] in regard to the analysis of the tissue for PD-L1 from the biopsy in 2015. I pointed out to her that maybe depending on the PET scan could be an indication to repeat a biopsy to be sure that we were not missing any new mutations that she could have developed in the meantime. Again the previous biopsy was in 2015.

## 2019-06-20 NOTE — PLAN OF CARE
Problem: Patient Care Overview  Goal: Plan of Care Review  Outcome: Ongoing (interventions implemented as appropriate)   06/20/19 4183   Coping/Psychosocial   Plan of Care Reviewed With patient   Plan of Care Review   Progress no change   OTHER   Outcome Summary Pt remains on room air. Breathing treatments administered per MD order.

## 2019-06-20 NOTE — PROGRESS NOTES
LOS: 4 days     Name: Vita Ren  Age/Sex: 44 y.o. female  :  1975        PCP: Jase Warner  Chief Complaint   Patient presents with   • Leg Pain     left leg pain starting x1 week ago.    • Shortness of Breath      Subjective   Pain is improved some today still requiring breakthrough pain medicine but has responded well to long-acting medications.  Remains concerned about follow-up care and need for opiates.  Wanting to proceed with kyphoplasty  General: No Fever or Chills, Cardiac: No Chest Pain or Palpitations, Resp: No Cough or SOA, GI: No Nausea, Vomiting, or Diarrhea and Other: No bleeding      budesonide 0.5 mg Nebulization BID - RT   calcium-vitamin D 1,000 mg Oral Daily   ceFAZolin 2 g Intravenous Once   cetirizine 10 mg Oral Daily   docusate sodium 100 mg Oral BID   famotidine 40 mg Oral BID AC   gabapentin 100 mg Oral Q12H   hydrocortisone-pramoxine  Rectal Every Other Day   ipratropium 0.5 mg Nebulization TID - RT   lactobacillus acidophilus 1 capsule Oral Daily   lidocaine 1 patch Transdermal Q24H   lubiprostone 8 mcg Oral BID With Meals   mirtazapine 15 mg Oral Nightly   montelukast 10 mg Oral Nightly   Morphine 15 mg Oral Q12H   naproxen 500 mg Oral Q8H   norethindrone 0.35 mg Oral Daily   OLANZapine 2.5 mg Oral Nightly   sennosides-docusate sodium 2 tablet Oral BID   sodium chloride 3 mL Intravenous Q12H       sodium chloride 50 mL/hr Last Rate: 50 mL/hr (19 0454)       Objective   Vital Signs  Temp:  [96.5 °F (35.8 °C)-98.4 °F (36.9 °C)] 98.4 °F (36.9 °C)  Heart Rate:  [] 104  Resp:  [16] 16  BP: (122-139)/(79-91) 139/90  Body mass index is 18.32 kg/m².    Intake/Output Summary (Last 24 hours) at 2019 1559  Last data filed at 2019 0453  Gross per 24 hour   Intake 1668 ml   Output --   Net 1668 ml       Physical Exam   Constitutional: She is oriented to person, place, and time. She appears well-developed and well-nourished.   HENT:   Head: Normocephalic and  atraumatic.   Cardiovascular: Normal rate, regular rhythm and normal heart sounds.   Abdominal: Soft. Bowel sounds are normal. She exhibits no distension.   Neurological: She is alert and oriented to person, place, and time.   Skin: Skin is warm and dry.   Psychiatric: She has a normal mood and affect. Her behavior is normal.   Nursing note and vitals reviewed.        Results Review:       I reviewed the patient's new clinical results.  Results from last 7 days   Lab Units 06/16/19  1102   WBC 10*3/mm3 8.97   HEMOGLOBIN g/dL 11.0*   PLATELETS 10*3/mm3 214     Results from last 7 days   Lab Units 06/16/19  1102   SODIUM mmol/L 143   POTASSIUM mmol/L 3.9   CHLORIDE mmol/L 108*   CO2 mmol/L 22.1   BUN mg/dL 10   CREATININE mg/dL 0.62   CALCIUM mg/dL 9.2   Estimated Creatinine Clearance: 96.1 mL/min (by C-G formula based on SCr of 0.62 mg/dL).      Assessment/Plan     Pain of metastatic malignancy    Malignant neoplasm of sigmoid colon (CMS/HCC)    Colon cancer metastasized to multiple sites including liver, lung, iliac lymph nodes and bone (CMS/HCC)    Left hip pain    L3 vertebral fracture (CMS/HCC)    Constipation    Colorectal cancer, stage IV (CMS/HCC)    Mild persistent asthma with acute exacerbation    Intractable back pain    Closed fracture of third lumbar vertebra (CMS/HCC)      PLAN  -Appreciate oncology's recommendations for pain medications.  She seems to be responding well to these changes.  Her pain medications seem to be working appropriately.  Plan to discharge with 2 weeks of medications to follow-up with her oncologist for additional narcotic prescriptions  -Appreciate neurosurgery's recommendations.  Plan Kyphoplasty tomorrow   -Continue medications for opiate-induced constipation.      Disposition  May be home tomorrow or Saturday      Sanchez Reyes MD  [facility]  06/20/19  3:59 PM

## 2019-06-20 NOTE — PROGRESS NOTES
Oncology Social Work  Inpatient Unit - [facility] park    Referral received from pharmacy to speak with patient and offer psychosocial support.  Chart reviewed.  Patient 's primary Oncologist is at [facility].  Patient with metastatic colon cancer - Dx in 2011. Patient admitted to our hospital on 6/16/2019 with uncontrolled pain.    OSW spoke to patient at bedside.  Offered time and space to express her feelings, fears and concerns.  Patient talked openly and freely.  History of anxiety.  Mood was sad, tearful , but hopeful that they surgery will help relieve her pain.  Discussed community resources for herself and her children.    Provided my contact number even though she has an OSW at [facility].    Thank you  Jen Juarez, MSSW, CSW, OSW-C

## 2019-06-20 NOTE — PROGRESS NOTES
"CC:  Back pain unchanged.      Blood pressure 124/80, pulse 67, temperature 97 °F (36.1 °C), temperature source Oral, resp. rate 16, height 167.6 cm (66\"), weight 51.5 kg (113 lb 8 oz), SpO2 97 %, not currently breastfeeding.      AA< Ox3  HANSEN well  No focal deficits  Lungs effort normal    ..  Results from last 7 days   Lab Units 06/16/19  1102   WBC 10*3/mm3 8.97   HEMOGLOBIN g/dL 11.0*   HEMATOCRIT % 36.3   PLATELETS 10*3/mm3 214       ..  Results from last 7 days   Lab Units 06/16/19  1102   SODIUM mmol/L 143   POTASSIUM mmol/L 3.9   CHLORIDE mmol/L 108*   CO2 mmol/L 22.1   BUN mg/dL 10   CREATININE mg/dL 0.62   GLUCOSE mg/dL 93   CALCIUM mg/dL 9.2       Stage IV met colorectal CA with known bone mets including L spine mets   hx of previous stereotactic radiosurgery at [facility] for L2, L3 mets  Worsening LBP  Pathologic L3 VCF      Pt aware that results from kypho can vary from no relief to significant relief.  She is very clear that she wants to try it no matter what.  Aware of risks including bleeding, infection, risk of nerve damage, complications from anesthesia.  Will check INR/PT, stop lovenox, NPO after MN. Plan kyphoplasty tomorrow.         "

## 2019-06-21 ENCOUNTER — ANESTHESIA EVENT (OUTPATIENT)
Dept: PERIOP | Facility: HOSPITAL | Age: 44
End: 2019-06-21

## 2019-06-21 ENCOUNTER — ANESTHESIA (OUTPATIENT)
Dept: PERIOP | Facility: HOSPITAL | Age: 44
End: 2019-06-21

## 2019-06-21 ENCOUNTER — APPOINTMENT (OUTPATIENT)
Dept: GENERAL RADIOLOGY | Facility: HOSPITAL | Age: 44
End: 2019-06-21

## 2019-06-21 VITALS
TEMPERATURE: 97.6 F | DIASTOLIC BLOOD PRESSURE: 80 MMHG | HEIGHT: 66 IN | RESPIRATION RATE: 18 BRPM | HEART RATE: 80 BPM | WEIGHT: 113.5 LBS | SYSTOLIC BLOOD PRESSURE: 114 MMHG | BODY MASS INDEX: 18.24 KG/M2 | OXYGEN SATURATION: 100 %

## 2019-06-21 PROCEDURE — 25010000003 CEFAZOLIN IN DEXTROSE 2-4 GM/100ML-% SOLUTION: Performed by: NEUROLOGICAL SURGERY

## 2019-06-21 PROCEDURE — 94799 UNLISTED PULMONARY SVC/PX: CPT

## 2019-06-21 PROCEDURE — 25010000002 MORPHINE PER 10 MG: Performed by: INTERNAL MEDICINE

## 2019-06-21 PROCEDURE — 25010000002 FENTANYL CITRATE (PF) 100 MCG/2ML SOLUTION: Performed by: NURSE ANESTHETIST, CERTIFIED REGISTERED

## 2019-06-21 PROCEDURE — 25010000002 MIDAZOLAM PER 1 MG: Performed by: ANESTHESIOLOGY

## 2019-06-21 PROCEDURE — 88360 TUMOR IMMUNOHISTOCHEM/MANUAL: CPT | Performed by: NEUROLOGICAL SURGERY

## 2019-06-21 PROCEDURE — 22514 PERQ VERTEBRAL AUGMENTATION: CPT | Performed by: NEUROLOGICAL SURGERY

## 2019-06-21 PROCEDURE — 25010000002 FENTANYL CITRATE (PF) 100 MCG/2ML SOLUTION: Performed by: ANESTHESIOLOGY

## 2019-06-21 PROCEDURE — 0QB00ZX EXCISION OF LUMBAR VERTEBRA, OPEN APPROACH, DIAGNOSTIC: ICD-10-PCS | Performed by: HOSPITALIST

## 2019-06-21 PROCEDURE — 25010000002 NEOSTIGMINE PER 0.5 MG: Performed by: NURSE ANESTHETIST, CERTIFIED REGISTERED

## 2019-06-21 PROCEDURE — 88307 TISSUE EXAM BY PATHOLOGIST: CPT | Performed by: NEUROLOGICAL SURGERY

## 2019-06-21 PROCEDURE — C1713 ANCHOR/SCREW BN/BN,TIS/BN: HCPCS | Performed by: NEUROLOGICAL SURGERY

## 2019-06-21 PROCEDURE — 25010000002 MIDAZOLAM PER 1 MG: Performed by: NURSE ANESTHETIST, CERTIFIED REGISTERED

## 2019-06-21 PROCEDURE — 25010000002 PROPOFOL 10 MG/ML EMULSION: Performed by: NURSE ANESTHETIST, CERTIFIED REGISTERED

## 2019-06-21 PROCEDURE — 0 IOPAMIDOL 41 % SOLUTION: Performed by: NEUROLOGICAL SURGERY

## 2019-06-21 PROCEDURE — 20982 ABLATE BONE TUMOR(S) PERQ: CPT | Performed by: NEUROLOGICAL SURGERY

## 2019-06-21 PROCEDURE — 0QS03ZZ REPOSITION LUMBAR VERTEBRA, PERCUTANEOUS APPROACH: ICD-10-PCS | Performed by: HOSPITALIST

## 2019-06-21 PROCEDURE — 25010000002 ONDANSETRON PER 1 MG: Performed by: NURSE ANESTHETIST, CERTIFIED REGISTERED

## 2019-06-21 PROCEDURE — 25010000002 VANCOMYCIN PER 500 MG: Performed by: NEUROLOGICAL SURGERY

## 2019-06-21 DEVICE — BONE CEMENT C01B HV-R WITH MIXER  US
Type: IMPLANTABLE DEVICE | Status: FUNCTIONAL
Brand: KYPHON® HV-R® BONE CEMENT AND KYPHON® MIXER PACK

## 2019-06-21 RX ORDER — PROMETHAZINE HYDROCHLORIDE 25 MG/1
25 SUPPOSITORY RECTAL ONCE AS NEEDED
Status: DISCONTINUED | OUTPATIENT
Start: 2019-06-21 | End: 2019-06-21 | Stop reason: HOSPADM

## 2019-06-21 RX ORDER — FLUMAZENIL 0.1 MG/ML
0.2 INJECTION INTRAVENOUS AS NEEDED
Status: DISCONTINUED | OUTPATIENT
Start: 2019-06-21 | End: 2019-06-21 | Stop reason: HOSPADM

## 2019-06-21 RX ORDER — LORAZEPAM 0.5 MG/1
0.5 TABLET ORAL EVERY 4 HOURS PRN
Qty: 40 TABLET | Refills: 0 | Status: SHIPPED | OUTPATIENT
Start: 2019-06-21 | End: 2019-07-05

## 2019-06-21 RX ORDER — HYDRALAZINE HYDROCHLORIDE 20 MG/ML
5 INJECTION INTRAMUSCULAR; INTRAVENOUS
Status: DISCONTINUED | OUTPATIENT
Start: 2019-06-21 | End: 2019-06-21 | Stop reason: HOSPADM

## 2019-06-21 RX ORDER — PROMETHAZINE HYDROCHLORIDE 25 MG/1
25 TABLET ORAL ONCE AS NEEDED
Status: DISCONTINUED | OUTPATIENT
Start: 2019-06-21 | End: 2019-06-21 | Stop reason: HOSPADM

## 2019-06-21 RX ORDER — ROCURONIUM BROMIDE 10 MG/ML
INJECTION, SOLUTION INTRAVENOUS AS NEEDED
Status: DISCONTINUED | OUTPATIENT
Start: 2019-06-21 | End: 2019-06-21 | Stop reason: SURG

## 2019-06-21 RX ORDER — LIDOCAINE HYDROCHLORIDE 10 MG/ML
0.5 INJECTION, SOLUTION EPIDURAL; INFILTRATION; INTRACAUDAL; PERINEURAL ONCE AS NEEDED
Status: DISCONTINUED | OUTPATIENT
Start: 2019-06-21 | End: 2019-06-21 | Stop reason: HOSPADM

## 2019-06-21 RX ORDER — FENTANYL CITRATE 50 UG/ML
50 INJECTION, SOLUTION INTRAMUSCULAR; INTRAVENOUS
Status: DISCONTINUED | OUTPATIENT
Start: 2019-06-21 | End: 2019-06-21 | Stop reason: HOSPADM

## 2019-06-21 RX ORDER — DIPHENHYDRAMINE HCL 25 MG
25 CAPSULE ORAL
Status: DISCONTINUED | OUTPATIENT
Start: 2019-06-21 | End: 2019-06-21 | Stop reason: HOSPADM

## 2019-06-21 RX ORDER — GLYCOPYRROLATE 0.2 MG/ML
INJECTION INTRAMUSCULAR; INTRAVENOUS AS NEEDED
Status: DISCONTINUED | OUTPATIENT
Start: 2019-06-21 | End: 2019-06-21 | Stop reason: SURG

## 2019-06-21 RX ORDER — PROMETHAZINE HYDROCHLORIDE 25 MG/ML
6.25 INJECTION, SOLUTION INTRAMUSCULAR; INTRAVENOUS
Status: DISCONTINUED | OUTPATIENT
Start: 2019-06-21 | End: 2019-06-21 | Stop reason: HOSPADM

## 2019-06-21 RX ORDER — PROMETHAZINE HYDROCHLORIDE 25 MG/ML
12.5 INJECTION, SOLUTION INTRAMUSCULAR; INTRAVENOUS ONCE AS NEEDED
Status: DISCONTINUED | OUTPATIENT
Start: 2019-06-21 | End: 2019-06-21 | Stop reason: HOSPADM

## 2019-06-21 RX ORDER — NALOXONE HCL 0.4 MG/ML
0.2 VIAL (ML) INJECTION AS NEEDED
Status: DISCONTINUED | OUTPATIENT
Start: 2019-06-21 | End: 2019-06-21 | Stop reason: HOSPADM

## 2019-06-21 RX ORDER — MAGNESIUM HYDROXIDE 1200 MG/15ML
LIQUID ORAL AS NEEDED
Status: DISCONTINUED | OUTPATIENT
Start: 2019-06-21 | End: 2019-06-21 | Stop reason: HOSPADM

## 2019-06-21 RX ORDER — VANCOMYCIN HYDROCHLORIDE 500 MG/10ML
INJECTION, POWDER, LYOPHILIZED, FOR SOLUTION INTRAVENOUS AS NEEDED
Status: DISCONTINUED | OUTPATIENT
Start: 2019-06-21 | End: 2019-06-21 | Stop reason: HOSPADM

## 2019-06-21 RX ORDER — FAMOTIDINE 10 MG/ML
20 INJECTION, SOLUTION INTRAVENOUS ONCE
Status: COMPLETED | OUTPATIENT
Start: 2019-06-21 | End: 2019-06-21

## 2019-06-21 RX ORDER — MIDAZOLAM HYDROCHLORIDE 1 MG/ML
INJECTION INTRAMUSCULAR; INTRAVENOUS AS NEEDED
Status: DISCONTINUED | OUTPATIENT
Start: 2019-06-21 | End: 2019-06-21 | Stop reason: SURG

## 2019-06-21 RX ORDER — METHADONE HYDROCHLORIDE 10 MG/1
10 TABLET ORAL ONCE
Status: COMPLETED | OUTPATIENT
Start: 2019-06-21 | End: 2019-06-21

## 2019-06-21 RX ORDER — FENTANYL CITRATE 50 UG/ML
INJECTION, SOLUTION INTRAMUSCULAR; INTRAVENOUS AS NEEDED
Status: DISCONTINUED | OUTPATIENT
Start: 2019-06-21 | End: 2019-06-21 | Stop reason: SURG

## 2019-06-21 RX ORDER — ACETAMINOPHEN 325 MG/1
650 TABLET ORAL ONCE AS NEEDED
Status: DISCONTINUED | OUTPATIENT
Start: 2019-06-21 | End: 2019-06-21 | Stop reason: HOSPADM

## 2019-06-21 RX ORDER — MIDAZOLAM HYDROCHLORIDE 1 MG/ML
2 INJECTION INTRAMUSCULAR; INTRAVENOUS
Status: DISCONTINUED | OUTPATIENT
Start: 2019-06-21 | End: 2019-06-21 | Stop reason: HOSPADM

## 2019-06-21 RX ORDER — ONDANSETRON 2 MG/ML
INJECTION INTRAMUSCULAR; INTRAVENOUS AS NEEDED
Status: DISCONTINUED | OUTPATIENT
Start: 2019-06-21 | End: 2019-06-21 | Stop reason: SURG

## 2019-06-21 RX ORDER — EPHEDRINE SULFATE 50 MG/ML
5 INJECTION, SOLUTION INTRAVENOUS ONCE AS NEEDED
Status: DISCONTINUED | OUTPATIENT
Start: 2019-06-21 | End: 2019-06-21 | Stop reason: HOSPADM

## 2019-06-21 RX ORDER — PROPOFOL 10 MG/ML
VIAL (ML) INTRAVENOUS AS NEEDED
Status: DISCONTINUED | OUTPATIENT
Start: 2019-06-21 | End: 2019-06-21 | Stop reason: SURG

## 2019-06-21 RX ORDER — HYDROMORPHONE HYDROCHLORIDE 1 MG/ML
0.5 INJECTION, SOLUTION INTRAMUSCULAR; INTRAVENOUS; SUBCUTANEOUS
Status: DISCONTINUED | OUTPATIENT
Start: 2019-06-21 | End: 2019-06-21 | Stop reason: HOSPADM

## 2019-06-21 RX ORDER — OXYCODONE AND ACETAMINOPHEN 7.5; 325 MG/1; MG/1
1 TABLET ORAL ONCE AS NEEDED
Status: DISCONTINUED | OUTPATIENT
Start: 2019-06-21 | End: 2019-06-21 | Stop reason: HOSPADM

## 2019-06-21 RX ORDER — LIDOCAINE HYDROCHLORIDE 20 MG/ML
INJECTION, SOLUTION INFILTRATION; PERINEURAL AS NEEDED
Status: DISCONTINUED | OUTPATIENT
Start: 2019-06-21 | End: 2019-06-21 | Stop reason: SURG

## 2019-06-21 RX ORDER — MORPHINE SULFATE 20 MG/ML
20 SOLUTION ORAL
Qty: 118 ML | Refills: 0 | Status: SHIPPED | OUTPATIENT
Start: 2019-06-21 | End: 2019-07-05

## 2019-06-21 RX ORDER — KETAMINE HYDROCHLORIDE 10 MG/ML
INJECTION INTRAMUSCULAR; INTRAVENOUS AS NEEDED
Status: DISCONTINUED | OUTPATIENT
Start: 2019-06-21 | End: 2019-06-21 | Stop reason: SURG

## 2019-06-21 RX ORDER — HYDROCODONE BITARTRATE AND ACETAMINOPHEN 7.5; 325 MG/1; MG/1
1 TABLET ORAL ONCE AS NEEDED
Status: DISCONTINUED | OUTPATIENT
Start: 2019-06-21 | End: 2019-06-21 | Stop reason: HOSPADM

## 2019-06-21 RX ORDER — DIPHENHYDRAMINE HYDROCHLORIDE 50 MG/ML
12.5 INJECTION INTRAMUSCULAR; INTRAVENOUS
Status: DISCONTINUED | OUTPATIENT
Start: 2019-06-21 | End: 2019-06-21 | Stop reason: HOSPADM

## 2019-06-21 RX ORDER — LUBIPROSTONE 8 UG/1
8 CAPSULE ORAL 2 TIMES DAILY WITH MEALS
Qty: 28 CAPSULE | Refills: 0 | Status: SHIPPED | OUTPATIENT
Start: 2019-06-21 | End: 2019-07-05

## 2019-06-21 RX ORDER — SODIUM CHLORIDE, SODIUM LACTATE, POTASSIUM CHLORIDE, CALCIUM CHLORIDE 600; 310; 30; 20 MG/100ML; MG/100ML; MG/100ML; MG/100ML
9 INJECTION, SOLUTION INTRAVENOUS CONTINUOUS
Status: DISCONTINUED | OUTPATIENT
Start: 2019-06-21 | End: 2019-06-22 | Stop reason: HOSPADM

## 2019-06-21 RX ORDER — GABAPENTIN 100 MG/1
100 CAPSULE ORAL EVERY 12 HOURS SCHEDULED
Qty: 28 CAPSULE | Refills: 0 | Status: SHIPPED | OUTPATIENT
Start: 2019-06-21 | End: 2019-07-05

## 2019-06-21 RX ORDER — SODIUM CHLORIDE 0.9 % (FLUSH) 0.9 %
1-10 SYRINGE (ML) INJECTION AS NEEDED
Status: DISCONTINUED | OUTPATIENT
Start: 2019-06-21 | End: 2019-06-21 | Stop reason: HOSPADM

## 2019-06-21 RX ORDER — ONDANSETRON 2 MG/ML
4 INJECTION INTRAMUSCULAR; INTRAVENOUS ONCE AS NEEDED
Status: DISCONTINUED | OUTPATIENT
Start: 2019-06-21 | End: 2019-06-21 | Stop reason: HOSPADM

## 2019-06-21 RX ORDER — MIDAZOLAM HYDROCHLORIDE 1 MG/ML
1 INJECTION INTRAMUSCULAR; INTRAVENOUS
Status: DISCONTINUED | OUTPATIENT
Start: 2019-06-21 | End: 2019-06-21 | Stop reason: HOSPADM

## 2019-06-21 RX ORDER — LABETALOL HYDROCHLORIDE 5 MG/ML
5 INJECTION, SOLUTION INTRAVENOUS
Status: DISCONTINUED | OUTPATIENT
Start: 2019-06-21 | End: 2019-06-21 | Stop reason: HOSPADM

## 2019-06-21 RX ORDER — MORPHINE SULFATE 15 MG/1
15 TABLET, FILM COATED, EXTENDED RELEASE ORAL EVERY 12 HOURS SCHEDULED
Qty: 28 TABLET | Refills: 0 | Status: SHIPPED | OUTPATIENT
Start: 2019-06-21 | End: 2019-07-05

## 2019-06-21 RX ADMIN — MORPHINE SULFATE 2 MG: 2 INJECTION, SOLUTION INTRAMUSCULAR; INTRAVENOUS at 15:54

## 2019-06-21 RX ADMIN — SENNOSIDES,DOCUSATE SODIUM 2 TABLET: 50; 8.6 TABLET, FILM COATED ORAL at 21:53

## 2019-06-21 RX ADMIN — GLYCOPYRROLATE 0.3 MG: 0.2 INJECTION INTRAMUSCULAR; INTRAVENOUS at 14:30

## 2019-06-21 RX ADMIN — ROCURONIUM BROMIDE 35 MG: 10 INJECTION INTRAVENOUS at 13:23

## 2019-06-21 RX ADMIN — FAMOTIDINE 40 MG: 40 TABLET, FILM COATED ORAL at 17:35

## 2019-06-21 RX ADMIN — CETIRIZINE HYDROCHLORIDE 10 MG: 10 TABLET, FILM COATED ORAL at 21:53

## 2019-06-21 RX ADMIN — FENTANYL CITRATE 50 MCG: 50 INJECTION, SOLUTION INTRAMUSCULAR; INTRAVENOUS at 13:23

## 2019-06-21 RX ADMIN — CEFAZOLIN SODIUM 2 G: 2 INJECTION, SOLUTION INTRAVENOUS at 13:20

## 2019-06-21 RX ADMIN — MONTELUKAST SODIUM 10 MG: 10 TABLET, FILM COATED ORAL at 21:53

## 2019-06-21 RX ADMIN — SODIUM CHLORIDE 50 ML/HR: 9 INJECTION, SOLUTION INTRAVENOUS at 02:40

## 2019-06-21 RX ADMIN — FENTANYL CITRATE 50 MCG: 50 INJECTION INTRAMUSCULAR; INTRAVENOUS at 12:23

## 2019-06-21 RX ADMIN — NAPROXEN 500 MG: 500 TABLET ORAL at 00:50

## 2019-06-21 RX ADMIN — KETAMINE HYDROCHLORIDE 25 MG: 10 INJECTION INTRAMUSCULAR; INTRAVENOUS at 13:35

## 2019-06-21 RX ADMIN — MORPHINE SULFATE 2 MG: 2 INJECTION, SOLUTION INTRAMUSCULAR; INTRAVENOUS at 11:25

## 2019-06-21 RX ADMIN — NEOSTIGMINE METHYLSULFATE 3 MG: 1 INJECTION INTRAMUSCULAR; INTRAVENOUS; SUBCUTANEOUS at 14:30

## 2019-06-21 RX ADMIN — NAPROXEN 500 MG: 500 TABLET ORAL at 09:16

## 2019-06-21 RX ADMIN — LORAZEPAM 0.5 MG: 0.5 TABLET ORAL at 10:46

## 2019-06-21 RX ADMIN — FENTANYL CITRATE 100 MCG: 50 INJECTION, SOLUTION INTRAMUSCULAR; INTRAVENOUS at 13:31

## 2019-06-21 RX ADMIN — SODIUM CHLORIDE, POTASSIUM CHLORIDE, SODIUM LACTATE AND CALCIUM CHLORIDE: 600; 310; 30; 20 INJECTION, SOLUTION INTRAVENOUS at 14:39

## 2019-06-21 RX ADMIN — MORPHINE SULFATE 15 MG: 15 TABLET, FILM COATED, EXTENDED RELEASE ORAL at 09:16

## 2019-06-21 RX ADMIN — MIDAZOLAM 1 MG: 1 INJECTION INTRAMUSCULAR; INTRAVENOUS at 13:09

## 2019-06-21 RX ADMIN — DOCUSATE SODIUM 100 MG: 100 CAPSULE, LIQUID FILLED ORAL at 21:53

## 2019-06-21 RX ADMIN — PROPOFOL 200 MG: 10 INJECTION, EMULSION INTRAVENOUS at 13:23

## 2019-06-21 RX ADMIN — FAMOTIDINE 20 MG: 10 INJECTION INTRAVENOUS at 12:22

## 2019-06-21 RX ADMIN — OLANZAPINE 2.5 MG: 2.5 TABLET, FILM COATED ORAL at 21:53

## 2019-06-21 RX ADMIN — Medication 2 MG: at 13:22

## 2019-06-21 RX ADMIN — NAPROXEN 500 MG: 500 TABLET ORAL at 17:35

## 2019-06-21 RX ADMIN — SODIUM CHLORIDE, POTASSIUM CHLORIDE, SODIUM LACTATE AND CALCIUM CHLORIDE: 600; 310; 30; 20 INJECTION, SOLUTION INTRAVENOUS at 13:20

## 2019-06-21 RX ADMIN — ONDANSETRON 4 MG: 2 INJECTION INTRAMUSCULAR; INTRAVENOUS at 14:26

## 2019-06-21 RX ADMIN — MORPHINE SULFATE 15 MG: 15 TABLET, FILM COATED, EXTENDED RELEASE ORAL at 21:53

## 2019-06-21 RX ADMIN — SODIUM CHLORIDE, PRESERVATIVE FREE 3 ML: 5 INJECTION INTRAVENOUS at 09:17

## 2019-06-21 RX ADMIN — FENTANYL CITRATE 50 MCG: 50 INJECTION, SOLUTION INTRAMUSCULAR; INTRAVENOUS at 14:36

## 2019-06-21 RX ADMIN — GABAPENTIN 100 MG: 100 CAPSULE ORAL at 09:16

## 2019-06-21 RX ADMIN — BUDESONIDE 0.5 MG: 0.5 INHALANT RESPIRATORY (INHALATION) at 07:21

## 2019-06-21 RX ADMIN — FENTANYL CITRATE 50 MCG: 50 INJECTION, SOLUTION INTRAMUSCULAR; INTRAVENOUS at 13:55

## 2019-06-21 RX ADMIN — GABAPENTIN 100 MG: 100 CAPSULE ORAL at 21:53

## 2019-06-21 RX ADMIN — Medication 100 UNITS: at 22:15

## 2019-06-21 RX ADMIN — FENTANYL CITRATE 50 MCG: 50 INJECTION INTRAMUSCULAR; INTRAVENOUS at 13:09

## 2019-06-21 RX ADMIN — IPRATROPIUM BROMIDE 0.5 MG: 0.5 SOLUTION RESPIRATORY (INHALATION) at 07:21

## 2019-06-21 RX ADMIN — METHADONE HYDROCHLORIDE 10 MG: 10 TABLET ORAL at 12:26

## 2019-06-21 RX ADMIN — SODIUM CHLORIDE, PRESERVATIVE FREE 3 ML: 5 INJECTION INTRAVENOUS at 22:14

## 2019-06-21 RX ADMIN — LIDOCAINE HYDROCHLORIDE 60 MG: 20 INJECTION, SOLUTION INFILTRATION; PERINEURAL at 13:23

## 2019-06-21 RX ADMIN — MIDAZOLAM 1 MG: 1 INJECTION INTRAMUSCULAR; INTRAVENOUS at 12:35

## 2019-06-21 RX ADMIN — LUBIPROSTONE 8 MCG: 8 CAPSULE, GELATIN COATED ORAL at 17:36

## 2019-06-21 RX ADMIN — ACETAMINOPHEN AND CODEINE PHOSPHATE 0.35 MG: 120; 12 SOLUTION ORAL at 21:53

## 2019-06-21 NOTE — ANESTHESIA PROCEDURE NOTES
Airway  Urgency: elective    Airway not difficult    General Information and Staff    Patient location during procedure: OR  Anesthesiologist: Samuel Casanova MD  CRNA: Francisca Doss CRNA    Indications and Patient Condition  Indications for airway management: airway protection    Preoxygenated: yes  MILS maintained throughout  Mask difficulty assessment: 1 - vent by mask    Final Airway Details  Final airway type: endotracheal airway      Successful airway: ETT  Cuffed: yes   Successful intubation technique: direct laryngoscopy  Facilitating devices/methods: anterior pressure/BURP and intubating stylet  Endotracheal tube insertion site: oral  Blade: Buenrostro  Blade size: 2  ETT size (mm): 7.0  Cormack-Lehane Classification: grade IIb - view of arytenoids or posterior of glottis only  Placement verified by: chest auscultation and capnometry   Cuff volume (mL): 8  Measured from: lips  ETT to lips (cm): 21  Number of attempts at approach: 1    Additional Comments  Pt preoxygenated, SIVI, bag mask vent, ATETI, dentition as before

## 2019-06-21 NOTE — PROGRESS NOTES
LOS: 5 days     Name: Vita Ren  Age/Sex: 44 y.o. female  :  1975        PCP: Jase Warner  Chief Complaint   Patient presents with   • Leg Pain     left leg pain starting x1 week ago.    • Shortness of Breath      Subjective   Nervous about surgery today but anxious to see if this will help her pain. Worried about her father in the ER  General: No Fever or Chills, Cardiac: No Chest Pain or Palpitations, Resp: No Cough or SOA, GI: No Nausea, Vomiting, or Diarrhea and Other: No bleeding      budesonide 0.5 mg Nebulization BID - RT   calcium-vitamin D 1,000 mg Oral Daily   ceFAZolin 2 g Intravenous Once   cetirizine 10 mg Oral Daily   docusate sodium 100 mg Oral BID   famotidine 40 mg Oral BID AC   gabapentin 100 mg Oral Q12H   hydrocortisone-pramoxine  Rectal Every Other Day   ipratropium 0.5 mg Nebulization TID - RT   lactobacillus acidophilus 1 capsule Oral Daily   lidocaine 1 patch Transdermal Q24H   lubiprostone 8 mcg Oral BID With Meals   mirtazapine 15 mg Oral Nightly   montelukast 10 mg Oral Nightly   Morphine 15 mg Oral Q12H   naproxen 500 mg Oral Q8H   norethindrone 0.35 mg Oral Daily   OLANZapine 2.5 mg Oral Nightly   sennosides-docusate sodium 2 tablet Oral BID   sodium chloride 3 mL Intravenous Q12H       sodium chloride 50 mL/hr Last Rate: 50 mL/hr (19 0240)       Objective   Vital Signs  Temp:  [96.5 °F (35.8 °C)-98.4 °F (36.9 °C)] 98.3 °F (36.8 °C)  Heart Rate:  [] 76  Resp:  [16-20] 16  BP: (124-139)/(69-90) 134/79  Body mass index is 18.32 kg/m².    Intake/Output Summary (Last 24 hours) at 2019 1046  Last data filed at 2019 0638  Gross per 24 hour   Intake 1313 ml   Output --   Net 1313 ml       Physical Exam   Constitutional: She is oriented to person, place, and time. She appears well-developed and well-nourished.   HENT:   Head: Normocephalic and atraumatic.   Cardiovascular: Normal rate, regular rhythm and normal heart sounds.   Abdominal: Soft. Bowel sounds  are normal. She exhibits no distension.   Neurological: She is alert and oriented to person, place, and time.   Skin: Skin is warm and dry.   Psychiatric: She has a normal mood and affect. Her behavior is normal.   Nursing note and vitals reviewed.        Results Review:       I reviewed the patient's new clinical results.  Results from last 7 days   Lab Units 06/16/19  1102   WBC 10*3/mm3 8.97   HEMOGLOBIN g/dL 11.0*   PLATELETS 10*3/mm3 214     Results from last 7 days   Lab Units 06/16/19  1102   SODIUM mmol/L 143   POTASSIUM mmol/L 3.9   CHLORIDE mmol/L 108*   CO2 mmol/L 22.1   BUN mg/dL 10   CREATININE mg/dL 0.62   CALCIUM mg/dL 9.2   Estimated Creatinine Clearance: 96.1 mL/min (by C-G formula based on SCr of 0.62 mg/dL).      Assessment/Plan     Pain of metastatic malignancy    Malignant neoplasm of sigmoid colon (CMS/HCC)    Colon cancer metastasized to multiple sites including liver, lung, iliac lymph nodes and bone (CMS/HCC)    Left hip pain    L3 vertebral fracture (CMS/HCC)    Constipation    Colorectal cancer, stage IV (CMS/HCC)    Mild persistent asthma with acute exacerbation    Intractable back pain    Closed fracture of third lumbar vertebra (CMS/HCC)      PLAN  -Appreciate oncology's recommendations for pain medications.  She seems to be responding well to these changes.  Her pain medications seem to be working appropriately.  Plan to discharge with 2 weeks of medications to follow-up with her oncologist for additional narcotic prescriptions  -Appreciate neurosurgery's recommendations.  Plan Kyphoplasty today  -Continue medications for opiate-induced constipation.    Disposition  May be home tomorrow      Sanchez Reyes MD  [facility]  06/21/19  10:46 AM

## 2019-06-21 NOTE — ANESTHESIA POSTPROCEDURE EVALUATION
"Patient: Vita Ren    Procedure Summary     Date:  06/21/19 Room / Location:  [facility] OR 19 INV / [facility] HYBRID OR 18/19    Anesthesia Start:  1320 Anesthesia Stop:  1443    Procedure:  KYPHOPLASTY 1-2 LEVELS, Lumbar 3, osteocool ablation at Lumbar 3 (Bilateral Spine Lumbar) Diagnosis:       Malignant neoplasm of sigmoid colon (CMS/HCC)      (Malignant neoplasm of sigmoid colon (CMS/HCC) [C18.7])    Surgeon:  Kamran Benjamin IV, MD Provider:  Samuel Casanova MD    Anesthesia Type:  general ASA Status:  3          Anesthesia Type: general  Last vitals  BP   146/94 (06/21/19 1156)   Temp   36.7 °C (98 °F) (06/21/19 1156)   Pulse   80 (06/21/19 1156)   Resp   16 (06/21/19 1156)     SpO2   99 % (06/21/19 1156)     Post Anesthesia Care and Evaluation    Patient location during evaluation: bedside  Patient participation: complete - patient participated  Level of consciousness: awake  Pain score: 2  Pain management: adequate  Airway patency: patent  Anesthetic complications: No anesthetic complications    Cardiovascular status: acceptable  Respiratory status: acceptable  Hydration status: acceptable    Comments: /94 (BP Location: Right arm, Patient Position: Lying)   Pulse 80   Temp 36.7 °C (98 °F) (Oral)   Resp 16   Ht 167.6 cm (66\")   Wt 51.5 kg (113 lb 8 oz)   SpO2 99%   BMI 18.32 kg/m²         "

## 2019-06-21 NOTE — ANESTHESIA PREPROCEDURE EVALUATION
Anesthesia Evaluation     Patient summary reviewed and Nursing notes reviewed   no history of anesthetic complications:  NPO Solid Status: > 8 hours  NPO Liquid Status: > 8 hours           Airway   Mallampati: I  TM distance: >3 FB  Neck ROM: full  No difficulty expected  Dental - normal exam     Pulmonary    (+) lung cancer, asthma,   (-) rhonchi, decreased breath sounds, wheezes, not a smoker    ROS comment: S/p pleurodeisis after ptx from mets last summer no problems since pleurodeisis   Cardiovascular   Exercise tolerance: good (4-7 METS)    Rhythm: regular  Rate: normal    (-) hypertension, CAD, angina, PEARSON, murmur      Neuro/Psych  (-) seizures, CVA  GI/Hepatic/Renal/Endo    (+)   liver disease,   (-) no renal disease, diabetes    ROS Comment: Stage 4 colon cancer with met to lung and back     Musculoskeletal     (+) chronic pain,       ROS comment: Has been on opiates for cancer pain approx 1 year   On multiple pain medication ms contin morpine prn here  and dilaudid at home   Abdominal     Abdomen: soft.   Substance History      OB/GYN          Other      history of cancer active                  Anesthesia Plan    ASA 3     general   (Preop  Methadone 10 mg would benefit from ketamine possibly )  intravenous induction   Anesthetic plan, all risks, benefits, and alternatives have been provided, discussed and informed consent has been obtained with: patient.

## 2019-06-21 NOTE — PLAN OF CARE
Problem: Fall Risk (Adult)  Goal: Absence of Fall  Outcome: Ongoing (interventions implemented as appropriate)      Problem: Pain, Chronic (Adult)  Goal: Acceptable Pain/Comfort Level and Functional Ability  Outcome: Ongoing (interventions implemented as appropriate)      Problem: Patient Care Overview  Goal: Plan of Care Review  Outcome: Ongoing (interventions implemented as appropriate)   06/21/19 1300   Coping/Psychosocial   Plan of Care Reviewed With patient   Plan of Care Review   Progress improving   OTHER   Outcome Summary NPO since MN for kyphoplasty on 6/21; Miralax given with BM result; patient rested fairly well with no MSIR needed

## 2019-06-21 NOTE — PROGRESS NOTES
Case Management Discharge Note    Final Note: Home with assist of family. Medications delivered to bedside per [facility] retail pharmacy. Family to transport per private auto. No additional needs noted.    Destination      No service has been selected for the patient.      Durable Medical Equipment      No service has been selected for the patient.      Dialysis/Infusion      No service has been selected for the patient.      Home Medical Care      No service has been selected for the patient.      Therapy      No service has been selected for the patient.      Community Resources      No service has been selected for the patient.             Final Discharge Disposition Code: 01 - home or self-care

## 2019-06-21 NOTE — DISCHARGE SUMMARY
Patient Name: Vita Ren  : 1975  MRN: [id number]    Date of Admission: 2019  Date of Discharge:  2019  Primary Care Physician: Jase Warner      Chief Complaint:   Leg Pain (left leg pain starting x1 week ago. ) and Shortness of Breath      Discharge Diagnoses     Active Hospital Problems    Diagnosis  POA   • **Pain of metastatic malignancy [G89.3]  Unknown   • Colorectal cancer, stage IV (CMS/HCC) [C19]  Yes   • Mild persistent asthma with acute exacerbation [J45.31]  Unknown   • Intractable back pain [M54.9]  Unknown   • Closed fracture of third lumbar vertebra (CMS/HCC) [S32.039A]  Unknown   • Constipation [K59.00]  Yes   • Left hip pain [M25.552]  Yes   • L3 vertebral fracture (CMS/HCC) [S32.039A]  Yes   • Colon cancer metastasized to multiple sites including liver, lung, iliac lymph nodes and bone (CMS/HCC) [C18.9]  Yes   • Malignant neoplasm of sigmoid colon (CMS/HCC) [C18.7]  Unknown      Resolved Hospital Problems   No resolved problems to display.        Hospital Course     Ms. Ren is a 44 y.o. female non-smoker with a history of metastatic colon cancer who presented to [facility] initially complaining of back pain and constipation.  Please see the admitting history and physical for further details.  She was found to have severe exacerbation of her pain as well as constipation and was admitted to the hospital for further evaluation and treatment.  She is admitted to the hospital started on pain medications these were adjusted by the oncologist here.  Imaging of the back showed a compression fracture in her last admission.  She still having a lot of pain and issues with movement.  Neurosurgery was consulted and discussed the kyphoplasty.  Ultimately the family and patient decided to proceed with kyphoplasty surgery here in the hospital.  She tolerated the procedure well is being discharged in satisfactory condition.  We have agreed to write for pain medications for  the next 14 days in order to get her to her next oncology appointment.  These been provided to our pharmacy here in refill prior to discharge.  The plans been discussed with the patient she will follow-up as directed.        Day of Discharge     HPI:   Lung better eager to have surgery today.  Okay to go home following the procedure    Physical Exam:  Temp:  [96.5 °F (35.8 °C)-98.4 °F (36.9 °C)] 97.7 °F (36.5 °C)  Heart Rate:  [] 88  Resp:  [16-20] 16  BP: (124-148)/() 144/86  Body mass index is 18.32 kg/m².  Physical Exam    Consultants     Consult Orders (all) (From admission, onward)    Start     Ordered    06/17/19 1605  Inpatient Neurosurgery Consult  Once     Specialty:  Neurosurgery  Provider:  Daryn Degroot MD    06/17/19 1604    06/17/19 1119  Inpatient Hospice / [facility] Consult  Once     Specialty:  Hospice and Palliative Medicine  Provider:  (Not yet assigned)    06/17/19 1119    06/16/19 1715  Inpatient Hematology & Oncology Consult  Once     Specialty:  Hematology and Oncology  Provider:  James Moise II, MD    06/16/19 1716    06/16/19 1506  [facility] (on-call MD unless specified)  Once     Specialty:  Internal Medicine  Provider:  (Not yet assigned)    06/16/19 1505    06/16/19 1419  Hematology and Oncology (on-call MD unless specified)  Once     Specialty:  Hematology and Oncology  Provider:  (Not yet assigned)    06/16/19 1418        Consulting Physician(s)     Provider Relationship Specialty    James Moise II, MD Consulting Physician Hematology and Oncology    Sanchez Reyes MD Consulting Physician Internal Medicine    Daryn Degroot MD Consulting Physician Neurosurgery        Procedures     KYPHOPLASTY 1-2 LEVELS, Lumbar 3, osteocool ablation at Lumbar 3    Imaging Results (all)     Procedure Component Value Units Date/Time    Kyphoplasty Vertebroplasty [[id number]] Resulted:  06/21/19 1449     Updated:  06/21/19 1449    Narrative:       This procedure was auto-finalized  with no dictation required.    CT Angiogram Chest With Contrast [[id number]] Collected:  06/16/19 1310     Updated:  06/16/19 1405    Narrative:       CT ANGIOGRAM CHEST WITH CONTRAST-     Radiation dose reduction techniques were utilized, including automated  exposure control and exposure modulation based on body size.     CLINICAL INFORMATION:  Metastatic colon carcinoma, shortness of breath.     COMPARISON: Outside CT examination dated 05/30/2019.     TECHNIQUE: Thin axial images acquired through the chest after dynamic  contrast enhancement with coronal, sagittal and 3-dimensional  reconstruction.     FINDINGS: There are innumerable nodular areas of consolidation  throughout both lungs, similar to the previous examination. The right  sided pneumothorax has diminished slightly in size within the interim.  No new area of consolidation has developed. No pleural effusion is seen.     Cardiac size within normal limits. No pericardial abnormality. Contour  and caliber of the thoracic aorta is normal, no aneurysm or dissection.  The esophagus is satisfactory in course and caliber.     No pulmonary embolus demonstrated.     CONCLUSION:  1. No pulmonary embolus, aortic aneurysm or dissection.  2. Innumerable nodular areas of consolidation seen throughout both lungs  identical to 05/30/2019, no pleural effusion, acute airspace disease or  new nodule has developed.     Findings called to Dr. Seals in the emergency room at the time of  completion, 1 PM.        This report was finalized on 6/16/2019 2:02 PM by Dr. Bernardo Gallegos M.D.       XR Chest 1 View [[id number]] Collected:  06/16/19 1159     Updated:  06/16/19 1208    Narrative:       XR CHEST 1 VW-     Clinical: 44-year-old female with shortness of breath, history of  metastatic colon carcinoma           COMPARISON outside CT examination 5/30/2019 and chest radiograph  5/9/2019, 12/21/2017     FINDINGS: Similar to the outside CT examination there is a  right-sided  pneumothorax, measuring approximately 5-7%. This does not appear to have  increased since previous CT. There is lower inspiratory effort on the  current examination compared to previous chest radiograph. This causing  crowding of the bronchovascular markings and accentuation of the chronic  parenchymal change which has been demonstrated on prior chest  radiographs dating to 2017. Mediport catheter is in stable position. No  pulmonary edema or pleural effusion identified. Cardiomediastinal  silhouette is stable. No other interval change has occurred.     This report was finalized on 6/16/2019 12:05 PM by Dr. Bernardo Gallegos M.D.             Results for orders placed during the hospital encounter of 06/16/19   Duplex Venous Lower Extremity - Left    Narrative · There was superficial venous valvular incompetence noted in the left   saphenofemoral junction.  · Normal left lower extremity venous duplex scan.             Pertinent Labs     Results from last 7 days   Lab Units 06/16/19  1102   WBC 10*3/mm3 8.97   HEMOGLOBIN g/dL 11.0*   PLATELETS 10*3/mm3 214     Results from last 7 days   Lab Units 06/16/19  1102   SODIUM mmol/L 143   POTASSIUM mmol/L 3.9   CHLORIDE mmol/L 108*   CO2 mmol/L 22.1   BUN mg/dL 10   CREATININE mg/dL 0.62   GLUCOSE mg/dL 93   Estimated Creatinine Clearance: 96.1 mL/min (by C-G formula based on SCr of 0.62 mg/dL).  Results from last 7 days   Lab Units 06/16/19  1102   ALBUMIN g/dL 4.00   BILIRUBIN mg/dL 0.3   ALK PHOS U/L 79   AST (SGOT) U/L 13   ALT (SGPT) U/L 6     Results from last 7 days   Lab Units 06/16/19  1102   CALCIUM mg/dL 9.2   ALBUMIN g/dL 4.00       Results from last 7 days   Lab Units 06/16/19  1102   TROPONIN T ng/mL <0.010   PROBNP pg/mL 83.5           Invalid input(s): LDLCALC        Test Results Pending at Discharge   None   Order Current Status    Tissue Pathology Exam In process          Discharge Details        Discharge Medications      New Medications       Instructions Start Date   gabapentin 100 MG capsule  Commonly known as:  NEURONTIN   100 mg, Oral, Every 12 Hours Scheduled      lubiprostone 8 MCG capsule  Commonly known as:  AMITIZA   8 mcg, Oral, 2 Times Daily With Meals      Morphine 15 MG 12 hr tablet  Commonly known as:  MS CONTIN   15 mg, Oral, Every 12 Hours Scheduled      morphine 100 MG/5ML solution concentrated solution   20 mg, Oral, Every 3 Hours PRN         Changes to Medications      Instructions Start Date   ibuprofen 200 MG tablet  Commonly known as:  XIMENA COE  What changed:  Another medication with the same name was removed. Continue taking this medication, and follow the directions you see here.   400 mg, Oral, 2 Times Daily PRN         Continue These Medications      Instructions Start Date   BROCCOLI EXTRACT PO   1 tablet, Oral, Daily      Calcium Citrate 150 MG capsule   1 capsule, Oral, Daily      CAYENNE PEPPER PO   1 tablet, Oral, Daily      docusate sodium 100 MG capsule  Commonly known as:  COLACE   100 mg, Oral, 2 Times Daily      fexofenadine 180 MG tablet  Commonly known as:  ALLEGRA   180 mg, Oral, Daily      GREEN TEA EXTRACT PO   1 tablet, Oral, Daily      lidocaine 5 %  Commonly known as:  LIDODERM   1 patch, Transdermal, Every 24 Hours, Remove & Discard patch within 12 hours or as directed by MD      LORazepam 0.5 MG tablet  Commonly known as:  ATIVAN   0.5 mg, Oral, Every 4 Hours PRN, Reports using once daily      mirtazapine 15 MG tablet  Commonly known as:  REMERON   15 mg, Oral, Nightly      multiple vitamin 10 mL in dextrose 5 % 1,000 mL   Intravenous, Weekly      norethindrone 0.35 MG tablet  Commonly known as:  MICRONOR   1 tablet, Oral, Every Evening      ondansetron 8 MG tablet  Commonly known as:  ZOFRAN   Oral, Every 8 Hours PRN      pantoprazole 40 MG EC tablet  Commonly known as:  PROTONIX   40 mg, Oral, As Needed, Only takes after chemo      PREBIOTIC PRODUCT PO   1 tablet, Oral, Daily      PROBIOTIC PRODUCT PO    1 Scoop, Oral, Daily      prochlorperazine 10 MG tablet  Commonly known as:  COMPAZINE   10 mg, Oral, Every 6 Hours PRN      sennosides-docusate sodium 8.6-50 MG tablet  Commonly known as:  SENOKOT-S   1 tablet, Oral, Every Evening      TUCKS HEMORRHOIDAL RE   1 application, Rectal, Every Other Day      TURMERIC PO   1 tablet, Oral, Daily      ZOMETA 4 MG/100ML solution infusion (premix)  Generic drug:  zoledronic acid   4 mg, Intravenous, Every 3 Months         Stop These Medications    CBD oral oil  Commonly known as:  cannabidiol     dexamethasone 4 MG tablet  Commonly known as:  DECADRON     HYDROcodone-acetaminophen 5-325 MG per tablet  Commonly known as:  NORCO     HYDROmorphone 2 MG tablet  Commonly known as:  DILAUDID            No Known Allergies      Discharge Disposition:  Home or Self Care    Discharge Diet:  Diet Order   Procedures   • NPO Diet       Discharge Activity:   Activity Instructions     Activity as Tolerated      Up WIth Assist            CODE STATUS:    Code Status and Medical Interventions:   Ordered at: 06/16/19 1716     Code Status:    CPR     Medical Interventions (Level of Support Prior to Arrest):    Full       Future Appointments   Date Time Provider Department Center   7/1/2019  9:15 AM Ernestine Colon APRN [facility] None     Additional Instructions for the Follow-ups that You Need to Schedule     Discharge Follow-up with PCP   As directed       Currently Documented PCP:    Jase Warner    PCP Phone Number:    [phone]     Follow Up Details:  14 days for pain med refill         Discharge Follow-up with Specified Provider: Dr Benjamin; 2 Weeks   As directed      To:  Dr Benjamin    Follow Up:  2 Weeks           Follow-up Information     Jase Warner .    Specialty:  Internal Medicine  Why:  14 days for pain med refill  Contact information:  [address] [address]  [phone]                   Additional Instructions for the Follow-ups that You Need to Schedule      Discharge Follow-up with PCP   As directed       Currently Documented PCP:    Jase Warner    PCP Phone Number:    [phone]     Follow Up Details:  14 days for pain med refill         Discharge Follow-up with Specified Provider: Dr Benjamin; 2 Weeks   As directed      To:  Dr Benjamin    Follow Up:  2 Weeks           Time Spent on Discharge:  Greater than 30 minutes      Electronically signed by Sanchez Reyes MD, 6/21/2019, 3:37 PM

## 2019-06-21 NOTE — PLAN OF CARE
Problem: Patient Care Overview  Goal: Plan of Care Review  Outcome: Ongoing (interventions implemented as appropriate)   06/20/19 2006   Coping/Psychosocial   Plan of Care Reviewed With patient   Plan of Care Review   Progress improving   OTHER   Outcome Summary Pt A&Ox4, VSS, no c/o pain all day - med management is working. Pt is still c/o of no BM today - sennakot increased to BID and prune juice was provided. Pt will be NPO after MN for a kyphoplasty with Dr. Benjamin tomorrow. Consents signed and on chart. Will continue to monitor.

## 2019-06-21 NOTE — PLAN OF CARE
Problem: Fall Risk (Adult)  Goal: Absence of Fall  Outcome: Ongoing (interventions implemented as appropriate)      Problem: Pain, Chronic (Adult)  Goal: Acceptable Pain/Comfort Level and Functional Ability  Outcome: Ongoing (interventions implemented as appropriate)      Problem: Patient Care Overview  Goal: Plan of Care Review  Outcome: Ongoing (interventions implemented as appropriate)   06/21/19 5630   Coping/Psychosocial   Plan of Care Reviewed With patient;spouse   Plan of Care Review   Progress improving   OTHER   Outcome Summary Pt is A&Ox4, no compltains of n/v/d, afebrile, went down for kyphyplasty and ate upon return. Has DC orders but wants to take a nap here before going home to be sure there are no medicine reactions. Applied ABD pad to lower back to cover procedure sites per pt request. Pt states her back is uncomfortable but did not request pain meds at this time. Will continue to monitor.

## 2019-06-21 NOTE — OP NOTE
KYPHOPLASTY 1-2 LEVELS  Procedure Note      Vita Ren  6/16/2019 - 6/21/2019  [id number]    SURGEON  Kamran Benjamin IV, MD    ASSISTANT:  none  INDICATION:  Intractable pain secondary to pathological compression fracture (colon cancer metastasis)    Pre-op Diagnosis:   Malignant neoplasm of sigmoid colon (CMS/HCC) [C18.7]    Post-Op Diagnosis Codes:     * Malignant neoplasm of sigmoid colon (CMS/HCC) [C18.7]    PROCEDURE PERFORMED:  Procedure(s):  KYPHOPLASTY 1-2 LEVELS, Lumbar 3, osteocool ablation at Lumbar 3  Biopsy Lumbar 3    Anesthesia: General    Staff:   Circulator: Sanchez Buenrostro RN  Scrub Person: Quentin Ronquillo  Vendor Representative: Chapis Duran  Assistant: Kenia Ny CSA  Vascular Radiology Technician: Stephanie Davey; Nicole Martinez; Janet Gamez    Estimated Blood Loss: minimal    Specimens:   Order Name Source Comment Collection Info Order Time   TISSUE PATHOLOGY EXAM Spine, Lumbar  Collected By: Kamran Benjamin IV, MD 6/21/2019  1:55 PM         Drains:  none    Findings: Compression fracture    Complications: none immediate    Details: The patient was brought to the operating room where general endotracheal anesthesia was induced.  The patient was placed prone onto bolsters onto the biplane angiography table and care was taken to pad all susceptible areas.    Biplane fluoroscopy was used throughout the operative intervention to localize the location of the compression fracture and to guide the instrumentation.    Positioning, localization and biopsy.  The Lumbar 3 level was identified with fluoroscopy and local anesthesia was injected over the pedicle.  An incision was made overlying the pedicles /bilaterally in the bone dissecting tool and obturator was advanced through the skin and subcutaneous tissues and musculature through the pedicle into the vertebral body.  The bone dissecting tool was removed leaving the obturator in place.  A core biopsy device was  advanced through the  into the vertebral body and used in order to obtain a biopsy which was sent for permanent section evaluation.    Ablation: Appropriate sized thermal ablation probes selected and placed through working channels to ablate lesion. Ablation performed with [place] (RTP)ol system.     Open reduction of fracture: A 15mm kypoplastic balloon was then advanced into the vertebral body and under fluoroscopic guidance inflated to reduce the compression fracture.  Once satisfactory reduction was obtained the balloon was slowly deflated.    Replacement of vertebral body: A mixture of methylmethacrylate, barium sulfate, and vancomycin was then placed into the bone void filler devices and a series of bone void filler devices were then used in order to satisfactorily replaced the vertebral body.  This was accomplished under fluoroscopic guidance.  Once the vertebral body was satisfactorily replaced and the cement was allowed to harden, the bone void filler device and obturator were removed.    Closure: A Monocryl stitch and Dermabond was used to close the wound.  The patient was then awakened from general endotracheal anesthesia, extubated, and taken to the recovery room in stable and good condition.    Kamran Benjamin IV, MD     Date: 6/21/2019  Time: 2:42 PM

## 2019-06-22 ENCOUNTER — READMISSION MANAGEMENT (OUTPATIENT)
Dept: CALL CENTER | Facility: HOSPITAL | Age: 44
End: 2019-06-22

## 2019-06-23 NOTE — OUTREACH NOTE
Prep Survey      Responses   Facility patient discharged from?  [facility]   Is patient eligible?  Yes   Discharge diagnosis  Pain of mes malignancy-Kyphoplasty this visit   Does the patient have one of the following disease processes/diagnoses(primary or secondary)?  General Surgery   Does the patient have Home health ordered?  No   Is there a DME ordered?  No   Prep survey completed?  Yes          Keila Brian RN

## 2019-06-24 ENCOUNTER — TELEPHONE (OUTPATIENT)
Dept: NEUROSURGERY | Facility: CLINIC | Age: 44
End: 2019-06-24

## 2019-06-24 DIAGNOSIS — S32.039A CLOSED FRACTURE OF THIRD LUMBAR VERTEBRA, UNSPECIFIED FRACTURE MORPHOLOGY, INITIAL ENCOUNTER (HCC): Primary | ICD-10-CM

## 2019-06-24 DIAGNOSIS — Z98.890 POST-OPERATIVE STATE: ICD-10-CM

## 2019-06-24 NOTE — TELEPHONE ENCOUNTER
Pt had surgery on 6/21. Pt is scheduled to see KK on 7/1 need an order for her  xrays lumbar please

## 2019-06-25 ENCOUNTER — READMISSION MANAGEMENT (OUTPATIENT)
Dept: CALL CENTER | Facility: HOSPITAL | Age: 44
End: 2019-06-25

## 2019-06-25 NOTE — OUTREACH NOTE
General Surgery Week 1 Survey      Responses   Facility patient discharged from?  [facility]   Does the patient have one of the following disease processes/diagnoses(primary or secondary)?  General Surgery   Is there a successful TCM telephone encounter documented?  No   Week 1 attempt successful?  No   Unsuccessful attempts  Attempt 1          Merari Cevallos RN

## 2019-06-27 ENCOUNTER — READMISSION MANAGEMENT (OUTPATIENT)
Dept: CALL CENTER | Facility: HOSPITAL | Age: 44
End: 2019-06-27

## 2019-06-27 NOTE — OUTREACH NOTE
General Surgery Week 1 Survey      Responses   Facility patient discharged from?  [facility]   Does the patient have one of the following disease processes/diagnoses(primary or secondary)?  General Surgery   Is there a successful TCM telephone encounter documented?  No   Week 1 attempt successful?  No   Unsuccessful attempts  Attempt 2          Sanchez Manley RN

## 2019-06-29 ENCOUNTER — READMISSION MANAGEMENT (OUTPATIENT)
Dept: CALL CENTER | Facility: HOSPITAL | Age: 44
End: 2019-06-29

## 2019-06-29 NOTE — OUTREACH NOTE
General Surgery Week 2 Survey      Responses   Facility patient discharged from?  [facility]   Does the patient have one of the following disease processes/diagnoses(primary or secondary)?  General Surgery   Week 2 attempt successful?  Yes   Call start time  1655   Call end time  1700   Discharge diagnosis  Pain of mes malignancy-Kyphoplasty this visit   Meds reviewed with patient/caregiver?  Yes   Is the patient having any side effects they believe may be caused by any medication additions or changes?  No   Does the patient have all medications related to this admission filled (includes all antibiotics, pain medications, etc.)  Yes   Is the patient taking all medications as directed (includes completed medication regime)?  Yes   Does the patient have a follow up appointment scheduled with their surgeon?  Yes   Has the patient kept scheduled appointments due by today?  N/A   Has home health visited the patient within 72 hours of discharge?  N/A   Psychosocial issues?  No   Comments   changes bandage daily, free of infection   Did the patient receive a copy of their discharge instructions?  Yes   Nursing interventions  Reviewed instructions with patient   What is the patient's perception of their health status since discharge?  Improving   Nursing interventions  Nurse provided patient education   Is the patient /caregiver able to teach back basic post-op care?  Drive as instructed by MD in discharge instructions, Take showers only when approved by MD-sponge bathe until then, No tub bath, swimming, or hot tub until instructed by MD, Keep incision areas clean,dry and protected, Lifting as instructed by MD in discharge instructions   Is the patient/caregiver able to teach back signs and symptoms of incisional infection?  Increased redness, swelling or pain at the incisonal site, Increased drainage or bleeding, Incisional warmth, Pus or odor from incision, Fever   Is the patient/caregiver able to teach back steps  to recovery at home?  Set small, achievable goals for return to baseline health, Rest and rebuild strength, gradually increase activity   Week 2 call completed?  Yes          Marisela Fagan RN

## 2019-07-01 ENCOUNTER — OFFICE VISIT (OUTPATIENT)
Dept: GASTROENTEROLOGY | Facility: CLINIC | Age: 44
End: 2019-07-01

## 2019-07-01 ENCOUNTER — HOSPITAL ENCOUNTER (OUTPATIENT)
Dept: GENERAL RADIOLOGY | Facility: HOSPITAL | Age: 44
Discharge: HOME | End: 2019-07-01
Admitting: NURSE PRACTITIONER

## 2019-07-01 ENCOUNTER — OFFICE VISIT (OUTPATIENT)
Dept: NEUROSURGERY | Facility: CLINIC | Age: 44
End: 2019-07-01

## 2019-07-01 VITALS
DIASTOLIC BLOOD PRESSURE: 62 MMHG | BODY MASS INDEX: 16.69 KG/M2 | WEIGHT: 106.3 LBS | SYSTOLIC BLOOD PRESSURE: 112 MMHG | TEMPERATURE: 97.8 F | HEIGHT: 67 IN

## 2019-07-01 VITALS
HEART RATE: 68 BPM | WEIGHT: 106 LBS | DIASTOLIC BLOOD PRESSURE: 68 MMHG | SYSTOLIC BLOOD PRESSURE: 90 MMHG | HEIGHT: 66 IN | BODY MASS INDEX: 17.04 KG/M2 | TEMPERATURE: 99.1 F | RESPIRATION RATE: 16 BRPM

## 2019-07-01 DIAGNOSIS — S32.039A CLOSED FRACTURE OF THIRD LUMBAR VERTEBRA, UNSPECIFIED FRACTURE MORPHOLOGY, INITIAL ENCOUNTER (HCC): ICD-10-CM

## 2019-07-01 DIAGNOSIS — K62.5 RECTAL BLEEDING: Primary | ICD-10-CM

## 2019-07-01 DIAGNOSIS — K59.03 DRUG-INDUCED CONSTIPATION: ICD-10-CM

## 2019-07-01 DIAGNOSIS — R05.9 COUGH: ICD-10-CM

## 2019-07-01 DIAGNOSIS — I95.9 HYPOTENSION, UNSPECIFIED HYPOTENSION TYPE: ICD-10-CM

## 2019-07-01 DIAGNOSIS — C19 COLORECTAL CANCER, STAGE IV (HCC): ICD-10-CM

## 2019-07-01 DIAGNOSIS — Z98.890 POST-OPERATIVE STATE: ICD-10-CM

## 2019-07-01 DIAGNOSIS — K21.9 GASTROESOPHAGEAL REFLUX DISEASE, ESOPHAGITIS PRESENCE NOT SPECIFIED: ICD-10-CM

## 2019-07-01 DIAGNOSIS — S32.039A CLOSED FRACTURE OF THIRD LUMBAR VERTEBRA, UNSPECIFIED FRACTURE MORPHOLOGY, INITIAL ENCOUNTER (HCC): Primary | ICD-10-CM

## 2019-07-01 DIAGNOSIS — Z83.71 FAMILY HISTORY OF POLYPS IN THE COLON: ICD-10-CM

## 2019-07-01 LAB
CYTO UR: NORMAL
LAB AP CASE REPORT: NORMAL
Lab: NORMAL
PATH REPORT.ADDENDUM SPEC: NORMAL
PATH REPORT.FINAL DX SPEC: NORMAL
PATH REPORT.GROSS SPEC: NORMAL

## 2019-07-01 PROCEDURE — 72100 X-RAY EXAM L-S SPINE 2/3 VWS: CPT

## 2019-07-01 PROCEDURE — 99024 POSTOP FOLLOW-UP VISIT: CPT | Performed by: NURSE PRACTITIONER

## 2019-07-01 PROCEDURE — 99214 OFFICE O/P EST MOD 30 MIN: CPT | Performed by: NURSE PRACTITIONER

## 2019-07-01 RX ORDER — NAPROXEN SODIUM 220 MG
440 TABLET ORAL 2 TIMES DAILY PRN
COMMUNITY
End: 2019-07-08 | Stop reason: HOSPADM

## 2019-07-01 RX ORDER — DEXAMETHASONE 2 MG/1
2 TABLET ORAL
COMMUNITY
End: 2019-07-05

## 2019-07-01 RX ORDER — LANOLIN ALCOHOL/MO/W.PET/CERES
1000 CREAM (GRAM) TOPICAL DAILY
COMMUNITY
End: 2019-08-28 | Stop reason: HOSPADM

## 2019-07-01 NOTE — PROGRESS NOTES
" HPI:   Vita Ren is a 44 y.o. female for follow-up of L3 vertebral compression fracture/pathologic metastatic colon cancer. She is status post L3 kyphoplasty with biopsy and radiofrequency ablation on June 21, 2019. Pathology revealed metastatic grade 2 colonic adenocarcinoma. She was discharged to home. She has not hadpostoperative complications. She reports improvement in back pain and left thigh pain. It is sharp and intermittent versus constant. She continues on both Morphine, naproxen, and gabapentin.  No fevers or wound issues.  She states in the last week she has felt a bit more short of breath, weak, fatigue, generalized malaise.  She feels that her blood count is low.  She has an appointment with her oncologist this afternoon.  Blood pressure systolic was 112 at home this morning.    She presents unaccompanied or accompanied by her mother.     Review of Systems   Constitutional: Negative for chills and fever.   Gastrointestinal: Positive for constipation.   Genitourinary: Negative for difficulty urinating and enuresis.   Musculoskeletal: Negative for back pain.   Skin: Negative for wound (wound redness, swelling, or drainage).   Neurological: Positive for weakness (whole body). Negative for numbness and headaches.   Psychiatric/Behavioral: Negative for sleep disturbance.        BP (!) 80/58 (BP Location: Left arm, Patient Position: Sitting, Cuff Size: Adult)   Pulse 68   Temp 99.1 °F (37.3 °C)   Resp 16   Ht 167.6 cm (66\")   Wt 48.1 kg (106 lb)   BMI 17.11 kg/m²     Physical Exam   Constitutional:   Cachectic chronically ill-appearing female in no acute distress.   Cardiovascular: Normal rate, regular rhythm and normal heart sounds.   No murmur heard.  Pulmonary/Chest: Effort normal. No accessory muscle usage. She has wheezes in the left upper field, the left middle field and the left lower field. She has rales in the left upper field, the left middle field and the left lower field. "   Neurological: She is alert. Gait normal.   Skin: Skin is warm and dry.   2 small stab incisions low back well approximated without redness drainage or swelling   Psychiatric: She has a normal mood and affect. Her behavior is normal. Judgment normal.   Vitals reviewed.    Neurologic Exam     Mental Status   Level of consciousness: alert  Normal comprehension.     Motor Exam   5/5 bilateral lower extremities     Sensory Exam   Right leg light touch: normal  Left leg light touch: normal    Gait, Coordination, and Reflexes     Gait  Gait: normal      Findings/Results:  Lumbar x-rays reveal kyphoplasty cement present at L3 in appropriate position.  No new compression deformity seen    Assessment/Plan:  Vita was seen today for l3 kypho 6/21.    Diagnoses and all orders for this visit:    Closed fracture of third lumbar vertebra, unspecified fracture morphology, initial encounter (CMS/[facility])    Hypotension, unspecified hypotension type      Discussion/Summary  Patient presents for follow-up of metastatic colon cancer.  She is status post L3 kyphoplasty with radiofrequency ablation.  Pathology showed metastatic colon cancer as expected.  Her blood pressure is low on today's exam, but I think the cuff is a bit too big.  Our pediatric cuff is not working.  She does not appear in distress.  Incisions are healing.  States the severe back pain is much better.  She continues to take liquid morphine, gabapentin, naproxen twice daily.  She is wondering if she should begin weaning off.  She is advised okay from our standpoint to wean off.  She still has some pain in the left anterior thigh.  Gabapentin continuation may benefit this.  We will defer to her oncologist for ongoing pain management.  We will see back in clinical follow-up in 3 months.  No imaging prior at this point.  We will be happy to see her sooner if there are new issues.  Patient has scheduled appointment with oncologist this afternoon due to fatigue, weakness,  "generalized malaise.  Blood pressure better at end of visit.  Does not appear in distress.  She was given a couple of glasses of water to hydrate while here.  Her  is going to drop off her recent PET scan for us to load for baseline.    Plan: Return in about 3 months (around 10/1/2019) for Follow-up with Dr. Benjamin.         Patient Care Team    Patient Care Team:  Jase Warner as PCP - General (Internal Medicine)    Thu Nguyen, APRN  7/1/2019    \"Dictated utilizing Dragon dictation\".    "

## 2019-07-01 NOTE — PROGRESS NOTES
Chief Complaint   Patient presents with   • Rectal Bleeding   • Constipation   • Abdominal Pain       Vita Ren is a  44 y.o. female here for a hospital follow up visit for constipation.     HPI  45 yo f presents today accompanied with her mother for hospital follow up visit for rectal bleeding and constipation. She is a patient of Dr. Arroyo. She was seen by our service at [facility] from 6/16/19-6/21/19. She was seen for LLQ abd pain, constipation and rectal bleeding. She has hx rectal adenocarcinoma stage IV with METS to liver, lungs and bone initially diagnosed in 2011. In the hospital she was found to have a new compression fracture and underwent kyphoplasty. She admits she is doing well overall except for feeling more SOB and fatigued since coming home but she thinks she is anemic again and has follow up planned with her oncologist today. She is planning to be part of a new immunotherapy clinical trial in [facility]. She had recent PET scan done and it was negative for any new cancers per patient report. She is still having issues with constipation and daily rectal bleeding when wiping. She admits the amitiza was working great for her in the hospital but she was unable to afford the prescription. Without the amitiza she has been taking miralax and SENNA PRN. Her bowels are starting out hard and then ending up formed but soft. She is having a bm every day or every other day. She is seeing bright red blood when wiping after every BM. She does have hx hemorrhoids and has felt them herself in the past. She denies any rectal pain but admits she has daily RLL abd pain. She also has hx reflux and a cough. She was taking pepcid routinely while going through chemotherapy but hasnt used it lately. She does not recall having an EGD done in the past. She denies any dysphagia, vomiting, diarrhea or melena. She admits her appetite is ok and her weight has dropped from 113 lbs in 6/16 to  106 lbs today. She wants to schedule EGD and colonoscopy with Dr. Arroyo. Her last colonoscopy was 5 years ago. She does have significant FH of colon polyps with multiple members on her mother's side having resections from them.       Past Medical History:   Diagnosis Date   • Cancer (CMS/HCC)     METASTATIC STAGE 4 COLORECTAL TO LIVER AND LUNGS   • Pneumothorax     RIGHT LUNG       Past Surgical History:   Procedure Laterality Date   • CHEST TUBE INSERTION     • COLON RESECTION      COLORECTAL RESECTION   • COLONOSCOPY  2014   • KYPHOPLASTY Bilateral 6/21/2019    Procedure: KYPHOPLASTY 1-2 LEVELS, Lumbar 3, osteocool ablation at Lumbar 3;  Surgeon: Kamran Benjamin IV, MD;  Location: [facility] 18/19;  Service: Neurosurgery       Scheduled Meds:    Continuous Infusions:  No current facility-administered medications for this visit.     PRN Meds:.    No Known Allergies    Social History     Socioeconomic History   • Marital status:      Spouse name: Not on file   • Number of children: Not on file   • Years of education: Not on file   • Highest education level: Not on file   Tobacco Use   • Smoking status: Never Smoker   • Smokeless tobacco: Never Used   Substance and Sexual Activity   • Alcohol use: No     Frequency: Never   • Drug use: No   • Sexual activity: Defer       Family History   Problem Relation Age of Onset   • Colon polyps Mother    • Colon polyps Sister    • Diverticulitis Maternal Grandfather    • Stomach cancer Maternal Grandfather    • Colon polyps Cousin    • Cancer Neg Hx        Review of Systems   Constitutional: Positive for fatigue. Negative for appetite change, chills, diaphoresis, fever and unexpected weight change.   HENT: Negative for nosebleeds, postnasal drip, sore throat, trouble swallowing and voice change.    Respiratory: Positive for cough and shortness of breath. Negative for choking, chest tightness, wheezing and stridor.    Cardiovascular: Negative for chest pain,  palpitations and leg swelling.   Gastrointestinal: Positive for abdominal pain, anal bleeding, blood in stool, constipation and nausea. Negative for abdominal distention, diarrhea, rectal pain and vomiting.   Endocrine: Negative for polydipsia, polyphagia and polyuria.   Musculoskeletal: Negative for gait problem.   Skin: Negative for rash and wound.   Allergic/Immunologic: Negative for food allergies.   Neurological: Negative for dizziness, speech difficulty and light-headedness.   Psychiatric/Behavioral: Negative for confusion, self-injury, sleep disturbance and suicidal ideas.       Vitals:    07/01/19 0942   BP: 112/62   Temp: 97.8 °F (36.6 °C)       Physical Exam   Constitutional: She is oriented to person, place, and time. She appears well-developed and well-nourished. She does not appear ill. No distress.   HENT:   Head: Normocephalic.   Eyes: Pupils are equal, round, and reactive to light.   Cardiovascular: Normal rate, regular rhythm and normal heart sounds.   Pulmonary/Chest: Effort normal and breath sounds normal.   Abdominal: Soft. Bowel sounds are normal. She exhibits no distension and no mass. There is no hepatosplenomegaly. There is tenderness. There is no rebound and no guarding. No hernia.       Musculoskeletal: Normal range of motion.   Neurological: She is alert and oriented to person, place, and time.   Skin: Skin is warm and dry.   Psychiatric: She has a normal mood and affect. Her speech is normal and behavior is normal. Judgment normal.       No images are attached to the encounter.    Assessment & Plan     1. Rectal bleeding  - Case Request; Standing  - Case Request    2. Drug-induced constipation  - Case Request; Standing  - Case Request    3. Colorectal cancer, stage IV (CMS/HCC)  - Case Request; Standing  - Case Request    4. Gastroesophageal reflux disease, esophagitis presence not specified  - Case Request; Standing  - Case Request    5. Cough  - Case Request; Standing  - Case  Request    6. Family history of polyps in the colon  - Case Request; Standing  - Case Request    Reviewed hospital records with the patient as well as her records from her oncologist at . Still having daily rectal bleeding and constipation. Out of Amitiza. Using miralax and senna PRN. Will give her more samples of Amitiza 8 daily to use. OK to stop the miralax and continue the SENNA PRN. Given her hx and current symptoms recommend EGD and colonoscopy with Dr. Arroyo for further evaluation. Patient Agreeable to the scopes. Will discuss with Dr. Arroyo about scheduling of the scopes. Can take pepcid PRN. Patient to follow up with Oncology today as planned. Call office next week with update. Call office with any issues.

## 2019-07-02 ENCOUNTER — TELEPHONE (OUTPATIENT)
Dept: GASTROENTEROLOGY | Facility: CLINIC | Age: 44
End: 2019-07-02

## 2019-07-02 ENCOUNTER — TELEPHONE (OUTPATIENT)
Dept: ONCOLOGY | Facility: CLINIC | Age: 44
End: 2019-07-02

## 2019-07-02 NOTE — TELEPHONE ENCOUNTER
I spoke to the pt's  and asked for a current number to reach his wife. He stated her cell phone has been messed up and that she has a replacement phone until it's fixed. Her temporary number is [phone]. I gave her number to Dr. Romero and advised her  Dr. Romero would most likely be calling Vita sometime today. Pt's  v/u.

## 2019-07-02 NOTE — TELEPHONE ENCOUNTER
----- Message from Gil Romero MD sent at 7/1/2019  5:13 PM EDT -----  Try to find this pt on the phone her phone doesn't take anymore messages, if not found tell me to print labs and send them to her

## 2019-07-02 NOTE — TELEPHONE ENCOUNTER
----- Message from Jesusita Berry sent at 7/2/2019  3:52 PM EDT -----  Regarding: Question  Contact: [phone]  Pt needs to speak with a nurse

## 2019-07-03 PROBLEM — R05.9 COUGH: Status: ACTIVE | Noted: 2019-07-03

## 2019-07-03 PROBLEM — K59.03 DRUG-INDUCED CONSTIPATION: Status: ACTIVE | Noted: 2019-07-03

## 2019-07-03 PROBLEM — K62.5 RECTAL BLEEDING: Status: ACTIVE | Noted: 2019-07-03

## 2019-07-03 PROBLEM — K21.9 GASTROESOPHAGEAL REFLUX DISEASE: Status: ACTIVE | Noted: 2019-07-03

## 2019-07-03 PROBLEM — Z83.71 FAMILY HISTORY OF POLYPS IN THE COLON: Status: ACTIVE | Noted: 2019-07-03

## 2019-07-08 ENCOUNTER — HOSPITAL ENCOUNTER (OUTPATIENT)
Facility: HOSPITAL | Age: 44
Setting detail: HOSPITAL OUTPATIENT SURGERY
Discharge: HOME | End: 2019-07-08
Attending: INTERNAL MEDICINE | Admitting: INTERNAL MEDICINE

## 2019-07-08 ENCOUNTER — ANESTHESIA (OUTPATIENT)
Dept: GASTROENTEROLOGY | Facility: HOSPITAL | Age: 44
End: 2019-07-08

## 2019-07-08 ENCOUNTER — ANESTHESIA EVENT (OUTPATIENT)
Dept: GASTROENTEROLOGY | Facility: HOSPITAL | Age: 44
End: 2019-07-08

## 2019-07-08 VITALS
WEIGHT: 104.13 LBS | DIASTOLIC BLOOD PRESSURE: 69 MMHG | RESPIRATION RATE: 16 BRPM | OXYGEN SATURATION: 97 % | SYSTOLIC BLOOD PRESSURE: 110 MMHG | BODY MASS INDEX: 16.73 KG/M2 | HEART RATE: 91 BPM | TEMPERATURE: 98.4 F | HEIGHT: 66 IN

## 2019-07-08 DIAGNOSIS — Z83.71 FAMILY HISTORY OF POLYPS IN THE COLON: ICD-10-CM

## 2019-07-08 DIAGNOSIS — C19 COLORECTAL CANCER, STAGE IV (HCC): ICD-10-CM

## 2019-07-08 DIAGNOSIS — R05.9 COUGH: ICD-10-CM

## 2019-07-08 DIAGNOSIS — K59.03 DRUG-INDUCED CONSTIPATION: ICD-10-CM

## 2019-07-08 DIAGNOSIS — K21.9 GASTROESOPHAGEAL REFLUX DISEASE, ESOPHAGITIS PRESENCE NOT SPECIFIED: ICD-10-CM

## 2019-07-08 DIAGNOSIS — K62.5 RECTAL BLEEDING: ICD-10-CM

## 2019-07-08 LAB
B-HCG UR QL: NEGATIVE
INTERNAL NEGATIVE CONTROL: NEGATIVE
INTERNAL POSITIVE CONTROL: POSITIVE
Lab: NORMAL

## 2019-07-08 PROCEDURE — S0260 H&P FOR SURGERY: HCPCS | Performed by: INTERNAL MEDICINE

## 2019-07-08 PROCEDURE — 81025 URINE PREGNANCY TEST: CPT | Performed by: INTERNAL MEDICINE

## 2019-07-08 PROCEDURE — 43239 EGD BIOPSY SINGLE/MULTIPLE: CPT | Performed by: INTERNAL MEDICINE

## 2019-07-08 PROCEDURE — 25010000002 PROPOFOL 10 MG/ML EMULSION: Performed by: ANESTHESIOLOGY

## 2019-07-08 PROCEDURE — 45378 DIAGNOSTIC COLONOSCOPY: CPT | Performed by: INTERNAL MEDICINE

## 2019-07-08 PROCEDURE — 88305 TISSUE EXAM BY PATHOLOGIST: CPT | Performed by: INTERNAL MEDICINE

## 2019-07-08 PROCEDURE — 25010000002 FENTANYL CITRATE (PF) 100 MCG/2ML SOLUTION: Performed by: ANESTHESIOLOGY

## 2019-07-08 RX ORDER — FENTANYL CITRATE 50 UG/ML
25 INJECTION, SOLUTION INTRAMUSCULAR; INTRAVENOUS
Status: DISCONTINUED | OUTPATIENT
Start: 2019-07-08 | End: 2019-07-08 | Stop reason: HOSPADM

## 2019-07-08 RX ORDER — MORPHINE SULFATE 2 MG/ML
1 INJECTION, SOLUTION INTRAMUSCULAR; INTRAVENOUS
Status: DISCONTINUED | OUTPATIENT
Start: 2019-07-08 | End: 2019-07-08 | Stop reason: HOSPADM

## 2019-07-08 RX ORDER — FAMOTIDINE 20 MG/1
20 TABLET, FILM COATED ORAL 2 TIMES DAILY
Qty: 120 TABLET | Refills: 0 | Status: SHIPPED | OUTPATIENT
Start: 2019-07-08 | End: 2019-08-09 | Stop reason: HOSPADM

## 2019-07-08 RX ORDER — GLYCOPYRROLATE 0.2 MG/ML
INJECTION INTRAMUSCULAR; INTRAVENOUS AS NEEDED
Status: DISCONTINUED | OUTPATIENT
Start: 2019-07-08 | End: 2019-07-08 | Stop reason: SURG

## 2019-07-08 RX ORDER — HEPARIN SODIUM 5000 [USP'U]/ML
5000 INJECTION, SOLUTION INTRAVENOUS; SUBCUTANEOUS ONCE
Status: DISCONTINUED | OUTPATIENT
Start: 2019-07-08 | End: 2019-07-08 | Stop reason: HOSPADM

## 2019-07-08 RX ORDER — MORPHINE SULFATE 10 MG/5ML
10 SOLUTION ORAL
COMMUNITY
End: 2019-08-28 | Stop reason: HOSPADM

## 2019-07-08 RX ORDER — LIDOCAINE HYDROCHLORIDE 20 MG/ML
INJECTION, SOLUTION INFILTRATION; PERINEURAL AS NEEDED
Status: DISCONTINUED | OUTPATIENT
Start: 2019-07-08 | End: 2019-07-08 | Stop reason: SURG

## 2019-07-08 RX ORDER — PROPOFOL 10 MG/ML
VIAL (ML) INTRAVENOUS CONTINUOUS PRN
Status: DISCONTINUED | OUTPATIENT
Start: 2019-07-08 | End: 2019-07-08 | Stop reason: SURG

## 2019-07-08 RX ORDER — GABAPENTIN 100 MG/1
100 CAPSULE ORAL 2 TIMES DAILY
COMMUNITY
End: 2019-08-28 | Stop reason: HOSPADM

## 2019-07-08 RX ORDER — HYDROCODONE BITARTRATE AND ACETAMINOPHEN 7.5; 325 MG/1; MG/1
1 TABLET ORAL ONCE AS NEEDED
Status: DISCONTINUED | OUTPATIENT
Start: 2019-07-08 | End: 2019-07-08 | Stop reason: HOSPADM

## 2019-07-08 RX ORDER — SODIUM CHLORIDE, SODIUM LACTATE, POTASSIUM CHLORIDE, CALCIUM CHLORIDE 600; 310; 30; 20 MG/100ML; MG/100ML; MG/100ML; MG/100ML
30 INJECTION, SOLUTION INTRAVENOUS CONTINUOUS PRN
Status: DISCONTINUED | OUTPATIENT
Start: 2019-07-08 | End: 2019-07-08 | Stop reason: HOSPADM

## 2019-07-08 RX ORDER — PROPOFOL 10 MG/ML
VIAL (ML) INTRAVENOUS AS NEEDED
Status: DISCONTINUED | OUTPATIENT
Start: 2019-07-08 | End: 2019-07-08 | Stop reason: SURG

## 2019-07-08 RX ADMIN — FENTANYL CITRATE 25 MCG: 50 INJECTION INTRAMUSCULAR; INTRAVENOUS at 14:26

## 2019-07-08 RX ADMIN — ALFENTANIL HYDROCHLORIDE 250 MCG: 500 INJECTION, SOLUTION INTRAVENOUS at 13:58

## 2019-07-08 RX ADMIN — LIDOCAINE HYDROCHLORIDE 60 MG: 20 INJECTION, SOLUTION INFILTRATION; PERINEURAL at 13:58

## 2019-07-08 RX ADMIN — SODIUM CHLORIDE, POTASSIUM CHLORIDE, SODIUM LACTATE AND CALCIUM CHLORIDE 30 ML/HR: 600; 310; 30; 20 INJECTION, SOLUTION INTRAVENOUS at 13:22

## 2019-07-08 RX ADMIN — PROPOFOL 160 MCG/KG/MIN: 10 INJECTION, EMULSION INTRAVENOUS at 13:58

## 2019-07-08 RX ADMIN — PROPOFOL 80 MG: 10 INJECTION, EMULSION INTRAVENOUS at 13:58

## 2019-07-08 RX ADMIN — GLYCOPYRROLATE 0.2 MCG: 0.2 INJECTION INTRAMUSCULAR; INTRAVENOUS at 13:55

## 2019-07-08 RX ADMIN — ALFENTANIL HYDROCHLORIDE 250 MCG: 500 INJECTION, SOLUTION INTRAVENOUS at 14:06

## 2019-07-08 NOTE — DISCHARGE INSTRUCTIONS
For the next 24 hours patient needs to be with a responsible adult.    For 24 hours DO NOT drive, operate machinery, appliances, drink alcohol, make important decisions or sign legal documents.    Start with a light or bland diet if you are feeling sick to your stomach otherwise advance to regular diet as tolerated.    Follow recommendations on procedure report if provided by your doctor.    Call Dr Arroyo for problems [phone]    Problems may include but not limited to: large amounts of bleeding, trouble breathing, repeated vomiting, severe unrelieved pain, fever or chills.

## 2019-07-08 NOTE — H&P
[facility]  Pre Procedure History & Physical    Chief Complaint:   Gerd, rectal bleeding    Subjective     HPI:   45 yo f presents today accompanied with her mother for hospital follow up visit for rectal bleeding and constipation. She is a patient of Dr. Arroyo. She was seen by our service at [facility] from 6/16/19-6/21/19. She was seen for LLQ abd pain, constipation and rectal bleeding. She has hx rectal adenocarcinoma stage IV with METS to liver, lungs and bone initially diagnosed in 2011. In the hospital she was found to have a new compression fracture and underwent kyphoplasty. She admits she is doing well overall except for feeling more SOB and fatigued since coming home but she thinks she is anemic again and has follow up planned with her oncologist today. She is planning to be part of a new immunotherapy clinical trial in [facility]. She had recent PET scan done and it was negative for any new cancers per patient report. She is still having issues with constipation and daily rectal bleeding when wiping. She admits the amitiza was working great for her in the hospital but she was unable to afford the prescription. Without the amitiza she has been taking miralax and SENNA PRN. Her bowels are starting out hard and then ending up formed but soft. She is having a bm every day or every other day. She is seeing bright red blood when wiping after every BM. She does have hx hemorrhoids and has felt them herself in the past. She denies any rectal pain but admits she has daily RLL abd pain. She also has hx reflux and a cough. She was taking pepcid routinely while going through chemotherapy but hasnt used it lately. She does not recall having an EGD done in the past. She denies any dysphagia, vomiting, diarrhea or melena. She admits her appetite is ok and her weight has dropped from 113 lbs in 6/16 to 106 lbs today. She wants to schedule EGD and colonoscopy  with Dr. Arroyo. Her last colonoscopy was 5 years ago. She does have significant FH of colon polyps with multiple members on her mother's side having resections from them.         Past Medical History:   Past Medical History:   Diagnosis Date   • Cancer (CMS/HCC)     METASTATIC STAGE 4 COLORECTAL TO LIVER AND LUNGS   • Pneumothorax     RIGHT LUNG       Past Surgical History:  Past Surgical History:   Procedure Laterality Date   • CHEST TUBE INSERTION     • COLON RESECTION      COLORECTAL RESECTION   • COLONOSCOPY  2014   • KYPHOPLASTY Bilateral 6/21/2019    Procedure: KYPHOPLASTY 1-2 LEVELS, Lumbar 3, osteocool ablation at Lumbar 3;  Surgeon: Kamran Benjamin IV, MD;  Location: [facility] OR 18/19;  Service: Neurosurgery   • OVARY SURGERY Right 04/2019    Right Ovary and Falopian tube removed       Family History:  Family History   Problem Relation Age of Onset   • Colon polyps Mother    • Colon polyps Sister    • Diverticulitis Maternal Grandfather    • Stomach cancer Maternal Grandfather    • Colon polyps Cousin    • Cancer Neg Hx    • Malig Hyperthermia Neg Hx        Social History:   reports that she has never smoked. She has never used smokeless tobacco. She reports that she uses drugs. Drug: Morphine. She reports that she does not drink alcohol.    Medications:   Medications Prior to Admission   Medication Sig Dispense Refill Last Dose   • BROCCOLI EXTRACT PO Take 1 tablet by mouth Daily.   Past Week at Unknown time   • Calcium Citrate 150 MG capsule Take 1 capsule by mouth Daily.   7/7/2019 at Unknown time   • Capsicum, Cayenne, (CAYENNE PEPPER PO) Take 1 tablet by mouth Daily.   Past Week at Unknown time   • Cholecalciferol (VITAMIN D PO) Take 2,000 Units by mouth Daily.   7/7/2019 at Unknown time   • docusate sodium (COLACE) 100 MG capsule Take 100 mg by mouth 2 (Two) Times a Day.   7/7/2019 at Unknown time   • fexofenadine (ALLEGRA) 180 MG tablet Take 180 mg by mouth Daily.   7/7/2019 at Unknown time    • gabapentin (NEURONTIN) 100 MG capsule Take 100 mg by mouth 2 (Two) Times a Day.   7/8/2019 at Unknown time   • Green Tea, Lorena sinensis, (GREEN TEA EXTRACT PO) Take 1 tablet by mouth Daily.   Past Week at Unknown time   • lidocaine (LIDODERM) 5 % Place 1 patch on the skin Daily. Remove & Discard patch within 12 hours or as directed by MD   7/7/2019 at Unknown time   • mirtazapine (REMERON) 15 MG tablet Take 15 mg by mouth Every Night.   7/7/2019 at Unknown time   • Morphine 10 MG/5ML solution Take  by mouth Every 2 (Two) Hours As Needed for Severe Pain .   7/8/2019 at 1000   • Morphine Sulfate ER 15 MG tablet extended-release 12 hour Take  by mouth.   7/8/2019 at Unknown time   • naproxen sodium (ALEVE) 220 MG tablet Take 440 mg by mouth 2 (Two) Times a Day As Needed.   Past Week at Unknown time   • norethindrone (MICRONOR) 0.35 MG tablet Take 1 tablet by mouth Every Evening.   7/7/2019 at Unknown time   • Pramoxine HCl (TUCKS HEMORRHOIDAL RE) Insert 1 application into the rectum As Needed.   Past Month at Unknown time   • PREBIOTIC PRODUCT PO Take 1 tablet by mouth Daily.   7/7/2019 at Unknown time   • PROBIOTIC PRODUCT PO Take 1 Scoop by mouth Daily.   7/7/2019 at Unknown time   • sennosides-docusate sodium (SENOKOT-S) 8.6-50 MG tablet Take 1 tablet by mouth Every Evening.   7/7/2019 at Unknown time   • TURMERIC PO Take 1 tablet by mouth Daily.   Past Week at Unknown time   • vitamin B-12 (CYANOCOBALAMIN) 1000 MCG tablet Take 1,000 mcg by mouth Daily.   7/7/2019 at Unknown time   • ibuprofen (ADVIL,MOTRIN) 200 MG tablet Take 400 mg by mouth 2 (Two) Times a Day As Needed for Mild Pain .   More than a month at Unknown time   • Milk Thistle 1000 MG capsule 150 capsules As Needed.   More than a month at Unknown time   • ondansetron (ZOFRAN) 8 MG tablet Take 8 mg by mouth Every 8 (Eight) Hours As Needed for Nausea or Vomiting.   More than a month at Unknown time   • pantoprazole (PROTONIX) 40 MG EC tablet Take  "40 mg by mouth As Needed. Only takes after chemo   More than a month at Unknown time   • prochlorperazine (COMPAZINE) 10 MG tablet Take 10 mg by mouth Every 6 (Six) Hours As Needed for Nausea or Vomiting.   More than a month at Unknown time   • zoledronic acid (ZOMETA) 4 MG/100ML solution infusion (premix) Infuse 4 mg into a venous catheter Every 3 (Three) Months.   More than a month at Unknown time       Allergies:  Patient has no known allergies.    ROS:    Pertinent items are noted in HPI, all other systems reviewed and negative     Objective     Blood pressure 110/82, pulse 78, temperature 98.4 °F (36.9 °C), temperature source Oral, resp. rate 14, height 167.6 cm (66\"), weight 47.2 kg (104 lb 2 oz), SpO2 99 %, not currently breastfeeding.    Physical Exam   Constitutional: Pt is oriented to person, place, and time and well-developed, well-nourished, and in no distress.   Mouth/Throat: Oropharynx is clear and moist.   Neck: Normal range of motion.   Cardiovascular: Normal rate, regular rhythm    Pulmonary/Chest: Effort normal    Abdominal: Soft. Nontender  Skin: Skin is warm and dry.   Psychiatric: Mood, memory, affect and judgment normal.     Assessment/Plan     Diagnosis:  Gerd, rectal bleeding    Anticipated Surgical Procedure:  egd/colonoscopy    The risks, benefits, and alternatives of this procedure have been discussed with the patient or the responsible party- the patient understands and agrees to proceed.                                                            "

## 2019-07-08 NOTE — ANESTHESIA PREPROCEDURE EVALUATION
Anesthesia Evaluation     Patient summary reviewed and Nursing notes reviewed   no history of anesthetic complications:  NPO Solid Status: > 6 hours  NPO Liquid Status: > 6 hours           Airway   Mallampati: II  TM distance: >3 FB  Neck ROM: full  no difficulty expected and No difficulty expected  Dental - normal exam     Pulmonary - normal exam    breath sounds clear to auscultation  (+) lung cancer, asthma,   (-) rhonchi, decreased breath sounds, wheezes, rales, stridor  Cardiovascular - negative cardio ROS and normal exam    NYHA Classification: I  Rhythm: regular  Rate: normal    (-) murmur, weak pulses, friction rub, systolic click, carotid bruits, JVD, peripheral edema      Neuro/Psych- negative ROS  GI/Hepatic/Renal/Endo    (+)  GERD, GI bleeding, liver disease,     Musculoskeletal (-) negative ROS    Abdominal  - normal exam    Abdomen: soft.   Substance History - negative use     OB/GYN negative ob/gyn ROS         Other      history of cancer active                    Anesthesia Plan    ASA 4     MAC     intravenous induction   Anesthetic plan, all risks, benefits, and alternatives have been provided, discussed and informed consent has been obtained with: patient.

## 2019-07-08 NOTE — ANESTHESIA POSTPROCEDURE EVALUATION
"Patient: Vita Ren    Procedure Summary     Date:  07/08/19 Room / Location:  [facility] ENDOSCOPY 1 / [facility] ENDOSCOPY    Anesthesia Start:  1351 Anesthesia Stop:  1422    Procedures:       ESOPHAGOGASTRODUODENOSCOPY with cold biopsies (N/A Esophagus)      COLONOSCOPY to cecum (N/A ) Diagnosis:       Rectal bleeding      Drug-induced constipation      Colorectal cancer, stage IV (CMS/HCC)      Gastroesophageal reflux disease, esophagitis presence not specified      Cough      Family history of polyps in the colon      (Rectal bleeding [K62.5])      (Drug-induced constipation [K59.03])      (Colorectal cancer, stage IV (CMS/HCC) [C19])      (Gastroesophageal reflux disease, esophagitis presence not specified [K21.9])      (Cough [R05])      (Family history of polyps in the colon [Z83.71])    Surgeon:  Kate Arroyo MD Provider:  David Mauricio MD    Anesthesia Type:  MAC ASA Status:  4          Anesthesia Type: MAC  Last vitals  BP   110/82 (07/08/19 1229)   Temp   36.9 °C (98.4 °F) (07/08/19 1229)   Pulse   78 (07/08/19 1229)   Resp   14 (07/08/19 1229)     SpO2   99 % (07/08/19 1229)     Post Anesthesia Care and Evaluation    Patient location during evaluation: PHASE II  Patient participation: complete - patient participated  Level of consciousness: awake  Pain management: adequate  Airway patency: patent  Anesthetic complications: No anesthetic complications    Cardiovascular status: acceptable  Respiratory status: acceptable  Hydration status: acceptable    Comments: /82 (BP Location: Left arm, Patient Position: Lying)   Pulse 78   Temp 36.9 °C (98.4 °F) (Oral)   Resp 14   Ht 167.6 cm (66\")   Wt 47.2 kg (104 lb 2 oz)   SpO2 99%   BMI 16.81 kg/m²         "

## 2019-07-09 ENCOUNTER — TELEPHONE (OUTPATIENT)
Dept: GASTROENTEROLOGY | Facility: CLINIC | Age: 44
End: 2019-07-09

## 2019-07-09 ENCOUNTER — READMISSION MANAGEMENT (OUTPATIENT)
Dept: CALL CENTER | Facility: HOSPITAL | Age: 44
End: 2019-07-09

## 2019-07-09 LAB
CYTO UR: NORMAL
LAB AP CASE REPORT: NORMAL
PATH REPORT.FINAL DX SPEC: NORMAL
PATH REPORT.GROSS SPEC: NORMAL

## 2019-07-09 NOTE — OUTREACH NOTE
General Surgery Week 3 Survey      Responses   Facility patient discharged from?  [facility]   Does the patient have one of the following disease processes/diagnoses(primary or secondary)?  General Surgery   Week 3 attempt successful?  No   Unsuccessful attempts  Attempt 1          Candice Watson RN

## 2019-07-10 ENCOUNTER — READMISSION MANAGEMENT (OUTPATIENT)
Dept: CALL CENTER | Facility: HOSPITAL | Age: 44
End: 2019-07-10

## 2019-07-10 NOTE — OUTREACH NOTE
General Surgery Week 3 Survey      Responses   Facility patient discharged from?  [place]   Does the patient have one of the following disease processes/diagnoses(primary or secondary)?  General Surgery   Week 3 attempt successful?  Yes   Call start time  1303   Call end time  1306   Discharge diagnosis  Pain of mes malignancy-Kyphoplasty this visit   Meds reviewed with patient/caregiver?  Yes   Is the patient having any side effects they believe may be caused by any medication additions or changes?  No   Does the patient have all medications related to this admission filled (includes all antibiotics, pain medications, etc.)  Yes   Is the patient taking all medications as directed (includes completed medication regime)?  Yes   Has the patient kept scheduled appointments due by today?  Yes   Psychosocial issues?  No   What is the patient's perception of their health status since discharge?  Improving   Is the patient /caregiver able to teach back basic post-op care?  Take showers only when approved by MD-sponge bathe until then, Drive as instructed by MD in discharge instructions, Lifting as instructed by MD in discharge instructions, Keep incision areas clean,dry and protected, No tub bath, swimming, or hot tub until instructed by MD   Is the patient/caregiver able to teach back signs and symptoms of incisional infection?  Fever, Pus or odor from incision, Increased drainage or bleeding, Increased redness, swelling or pain at the incisonal site, Incisional warmth   Is the patient/caregiver able to teach back steps to recovery at home?  Eat a well-balance diet, Weigh daily, Rest and rebuild strength, gradually increase activity, Set small, achievable goals for return to baseline health   Is the patient/caregiver able to teach back the hierarchy of who to call/visit for symptoms/problems? PCP, Specialist, Home health nurse, Urgent Care, ED, 911  Yes   Week 3 call completed?  Yes          Sylvia Yarbrough RN

## 2019-07-12 NOTE — TELEPHONE ENCOUNTER
Called pt and identified vm advised per Dr Arroyo that her stomach bx were normal and no significant inflammation was seen .  Advised to call back with questions.

## 2019-07-17 ENCOUNTER — READMISSION MANAGEMENT (OUTPATIENT)
Dept: CALL CENTER | Facility: HOSPITAL | Age: 44
End: 2019-07-17

## 2019-07-17 NOTE — OUTREACH NOTE
General Surgery Week 4 Survey      Responses   Facility patient discharged from?  [place]   Does the patient have one of the following disease processes/diagnoses(primary or secondary)?  General Surgery   Week 4 attempt successful?  No          Ashley Payne RN

## 2019-07-22 ENCOUNTER — APPOINTMENT (OUTPATIENT)
Dept: GENERAL RADIOLOGY | Facility: HOSPITAL | Age: 44
End: 2019-07-22

## 2019-07-22 ENCOUNTER — HOSPITAL ENCOUNTER (EMERGENCY)
Facility: HOSPITAL | Age: 44
Discharge: HOME | End: 2019-07-23
Attending: EMERGENCY MEDICINE | Admitting: EMERGENCY MEDICINE

## 2019-07-22 ENCOUNTER — APPOINTMENT (OUTPATIENT)
Dept: CT IMAGING | Facility: HOSPITAL | Age: 44
End: 2019-07-22

## 2019-07-22 DIAGNOSIS — R10.9 ACUTE ABDOMINAL PAIN: ICD-10-CM

## 2019-07-22 DIAGNOSIS — G89.3 CHRONIC PAIN DUE TO NEOPLASM: Primary | ICD-10-CM

## 2019-07-22 LAB
ALBUMIN SERPL-MCNC: 4.3 G/DL (ref 3.5–5.2)
ALBUMIN/GLOB SERPL: 1.1 G/DL
ALP SERPL-CCNC: 85 U/L (ref 39–117)
ALT SERPL W P-5'-P-CCNC: 10 U/L (ref 1–33)
ANION GAP SERPL CALCULATED.3IONS-SCNC: 13.3 MMOL/L (ref 5–15)
AST SERPL-CCNC: 22 U/L (ref 1–32)
BASOPHILS # BLD AUTO: 0.03 10*3/MM3 (ref 0–0.2)
BASOPHILS NFR BLD AUTO: 0.2 % (ref 0–1.5)
BILIRUB SERPL-MCNC: 0.4 MG/DL (ref 0.2–1.2)
BUN BLD-MCNC: 16 MG/DL (ref 6–20)
BUN/CREAT SERPL: 23.5 (ref 7–25)
CALCIUM SPEC-SCNC: 9.4 MG/DL (ref 8.6–10.5)
CHLORIDE SERPL-SCNC: 101 MMOL/L (ref 98–107)
CO2 SERPL-SCNC: 24.7 MMOL/L (ref 22–29)
CREAT BLD-MCNC: 0.68 MG/DL (ref 0.57–1)
DEPRECATED RDW RBC AUTO: 62.4 FL (ref 37–54)
EOSINOPHIL # BLD AUTO: 0.36 10*3/MM3 (ref 0–0.4)
EOSINOPHIL NFR BLD AUTO: 2.7 % (ref 0.3–6.2)
ERYTHROCYTE [DISTWIDTH] IN BLOOD BY AUTOMATED COUNT: 18.7 % (ref 12.3–15.4)
GFR SERPL CREATININE-BSD FRML MDRD: 95 ML/MIN/1.73
GLOBULIN UR ELPH-MCNC: 3.8 GM/DL
GLUCOSE BLD-MCNC: 122 MG/DL (ref 65–99)
HCT VFR BLD AUTO: 41.4 % (ref 34–46.6)
HGB BLD-MCNC: 13.5 G/DL (ref 12–15.9)
IMM GRANULOCYTES # BLD AUTO: 0.07 10*3/MM3 (ref 0–0.05)
IMM GRANULOCYTES NFR BLD AUTO: 0.5 % (ref 0–0.5)
LIPASE SERPL-CCNC: 34 U/L (ref 13–60)
LYMPHOCYTES # BLD AUTO: 1.03 10*3/MM3 (ref 0.7–3.1)
LYMPHOCYTES NFR BLD AUTO: 7.7 % (ref 19.6–45.3)
MCH RBC QN AUTO: 30.1 PG (ref 26.6–33)
MCHC RBC AUTO-ENTMCNC: 32.6 G/DL (ref 31.5–35.7)
MCV RBC AUTO: 92.2 FL (ref 79–97)
MONOCYTES # BLD AUTO: 0.59 10*3/MM3 (ref 0.1–0.9)
MONOCYTES NFR BLD AUTO: 4.4 % (ref 5–12)
NEUTROPHILS # BLD AUTO: 11.29 10*3/MM3 (ref 1.7–7)
NEUTROPHILS NFR BLD AUTO: 84.5 % (ref 42.7–76)
NRBC BLD AUTO-RTO: 0 /100 WBC (ref 0–0.2)
NT-PROBNP SERPL-MCNC: 49.5 PG/ML (ref 5–450)
PLATELET # BLD AUTO: 259 10*3/MM3 (ref 140–450)
PMV BLD AUTO: 8.9 FL (ref 6–12)
POTASSIUM BLD-SCNC: 3.5 MMOL/L (ref 3.5–5.2)
PROT SERPL-MCNC: 8.1 G/DL (ref 6–8.5)
RBC # BLD AUTO: 4.49 10*6/MM3 (ref 3.77–5.28)
SODIUM BLD-SCNC: 139 MMOL/L (ref 136–145)
TROPONIN T SERPL-MCNC: <0.01 NG/ML (ref 0–0.03)
WBC NRBC COR # BLD: 13.37 10*3/MM3 (ref 3.4–10.8)

## 2019-07-22 PROCEDURE — 83880 ASSAY OF NATRIURETIC PEPTIDE: CPT | Performed by: EMERGENCY MEDICINE

## 2019-07-22 PROCEDURE — 96376 TX/PRO/DX INJ SAME DRUG ADON: CPT

## 2019-07-22 PROCEDURE — 96375 TX/PRO/DX INJ NEW DRUG ADDON: CPT

## 2019-07-22 PROCEDURE — 84484 ASSAY OF TROPONIN QUANT: CPT | Performed by: EMERGENCY MEDICINE

## 2019-07-22 PROCEDURE — 25010000002 HYDROMORPHONE 1 MG/ML SOLUTION: Performed by: EMERGENCY MEDICINE

## 2019-07-22 PROCEDURE — 99283 EMERGENCY DEPT VISIT LOW MDM: CPT

## 2019-07-22 PROCEDURE — 96374 THER/PROPH/DIAG INJ IV PUSH: CPT

## 2019-07-22 PROCEDURE — 93005 ELECTROCARDIOGRAM TRACING: CPT | Performed by: EMERGENCY MEDICINE

## 2019-07-22 PROCEDURE — 25010000002 ONDANSETRON PER 1 MG: Performed by: EMERGENCY MEDICINE

## 2019-07-22 PROCEDURE — 25010000002 IOPAMIDOL 61 % SOLUTION: Performed by: EMERGENCY MEDICINE

## 2019-07-22 PROCEDURE — 83690 ASSAY OF LIPASE: CPT | Performed by: EMERGENCY MEDICINE

## 2019-07-22 PROCEDURE — 74177 CT ABD & PELVIS W/CONTRAST: CPT

## 2019-07-22 PROCEDURE — 80053 COMPREHEN METABOLIC PANEL: CPT | Performed by: EMERGENCY MEDICINE

## 2019-07-22 PROCEDURE — 71045 X-RAY EXAM CHEST 1 VIEW: CPT

## 2019-07-22 PROCEDURE — 93010 ELECTROCARDIOGRAM REPORT: CPT | Performed by: INTERNAL MEDICINE

## 2019-07-22 PROCEDURE — 85025 COMPLETE CBC W/AUTO DIFF WBC: CPT | Performed by: EMERGENCY MEDICINE

## 2019-07-22 RX ORDER — SODIUM CHLORIDE 0.9 % (FLUSH) 0.9 %
10 SYRINGE (ML) INJECTION EVERY 12 HOURS SCHEDULED
Status: DISCONTINUED | OUTPATIENT
Start: 2019-07-22 | End: 2019-07-23 | Stop reason: HOSPADM

## 2019-07-22 RX ORDER — SODIUM CHLORIDE 0.9 % (FLUSH) 0.9 %
10 SYRINGE (ML) INJECTION AS NEEDED
Status: DISCONTINUED | OUTPATIENT
Start: 2019-07-22 | End: 2019-07-23 | Stop reason: HOSPADM

## 2019-07-22 RX ORDER — SODIUM CHLORIDE 9 MG/ML
125 INJECTION, SOLUTION INTRAVENOUS CONTINUOUS
Status: DISCONTINUED | OUTPATIENT
Start: 2019-07-22 | End: 2019-07-23 | Stop reason: HOSPADM

## 2019-07-22 RX ORDER — SODIUM CHLORIDE 0.9 % (FLUSH) 0.9 %
20 SYRINGE (ML) INJECTION AS NEEDED
Status: DISCONTINUED | OUTPATIENT
Start: 2019-07-22 | End: 2019-07-23 | Stop reason: HOSPADM

## 2019-07-22 RX ORDER — ONDANSETRON 2 MG/ML
4 INJECTION INTRAMUSCULAR; INTRAVENOUS ONCE
Status: COMPLETED | OUTPATIENT
Start: 2019-07-22 | End: 2019-07-22

## 2019-07-22 RX ADMIN — IOPAMIDOL 85 ML: 612 INJECTION, SOLUTION INTRAVENOUS at 22:27

## 2019-07-22 RX ADMIN — ONDANSETRON 4 MG: 2 INJECTION INTRAMUSCULAR; INTRAVENOUS at 21:11

## 2019-07-22 RX ADMIN — HYDROMORPHONE HYDROCHLORIDE 1 MG: 1 INJECTION, SOLUTION INTRAMUSCULAR; INTRAVENOUS; SUBCUTANEOUS at 20:47

## 2019-07-22 RX ADMIN — HYDROMORPHONE HYDROCHLORIDE 2 MG: 1 INJECTION, SOLUTION INTRAMUSCULAR; INTRAVENOUS; SUBCUTANEOUS at 21:12

## 2019-07-23 VITALS
RESPIRATION RATE: 18 BRPM | BODY MASS INDEX: 19.77 KG/M2 | WEIGHT: 123 LBS | SYSTOLIC BLOOD PRESSURE: 121 MMHG | OXYGEN SATURATION: 94 % | HEART RATE: 109 BPM | DIASTOLIC BLOOD PRESSURE: 89 MMHG | TEMPERATURE: 99.5 F | HEIGHT: 66 IN

## 2019-07-23 RX ADMIN — Medication 500 UNITS: at 00:52

## 2019-08-05 ENCOUNTER — APPOINTMENT (OUTPATIENT)
Dept: CT IMAGING | Facility: HOSPITAL | Age: 44
End: 2019-08-05

## 2019-08-05 ENCOUNTER — HOSPITAL ENCOUNTER (INPATIENT)
Facility: HOSPITAL | Age: 44
LOS: 3 days | Discharge: HOME | End: 2019-08-09
Attending: EMERGENCY MEDICINE | Admitting: HOSPITALIST

## 2019-08-05 ENCOUNTER — APPOINTMENT (OUTPATIENT)
Dept: GENERAL RADIOLOGY | Facility: HOSPITAL | Age: 44
End: 2019-08-05

## 2019-08-05 DIAGNOSIS — C79.9 METASTATIC DISEASE (HCC): ICD-10-CM

## 2019-08-05 DIAGNOSIS — J45.901 MODERATE ASTHMA WITH EXACERBATION, UNSPECIFIED WHETHER PERSISTENT: Primary | ICD-10-CM

## 2019-08-05 DIAGNOSIS — J45.41 MODERATE PERSISTENT ASTHMA WITH EXACERBATION: ICD-10-CM

## 2019-08-05 DIAGNOSIS — C78.01 SECONDARY MALIGNANT NEOPLASM OF RIGHT LUNG (HCC): ICD-10-CM

## 2019-08-05 LAB
ALBUMIN SERPL-MCNC: 3.9 G/DL (ref 3.5–5.2)
ALBUMIN/GLOB SERPL: 1.5 G/DL
ALP SERPL-CCNC: 73 U/L (ref 39–117)
ALT SERPL W P-5'-P-CCNC: 8 U/L (ref 1–33)
ANION GAP SERPL CALCULATED.3IONS-SCNC: 9.1 MMOL/L (ref 5–15)
AST SERPL-CCNC: 19 U/L (ref 1–32)
BASOPHILS # BLD AUTO: 0.03 10*3/MM3 (ref 0–0.2)
BASOPHILS NFR BLD AUTO: 0.2 % (ref 0–1.5)
BILIRUB SERPL-MCNC: 0.3 MG/DL (ref 0.2–1.2)
BUN BLD-MCNC: 7 MG/DL (ref 6–20)
BUN/CREAT SERPL: 14.3 (ref 7–25)
CALCIUM SPEC-SCNC: 8.7 MG/DL (ref 8.6–10.5)
CHLORIDE SERPL-SCNC: 107 MMOL/L (ref 98–107)
CO2 SERPL-SCNC: 23.9 MMOL/L (ref 22–29)
CREAT BLD-MCNC: 0.49 MG/DL (ref 0.57–1)
D DIMER PPP FEU-MCNC: 1.82 MCGFEU/ML (ref 0–0.49)
D-LACTATE SERPL-SCNC: 0.4 MMOL/L (ref 0.5–2)
DEPRECATED RDW RBC AUTO: 57.1 FL (ref 37–54)
EOSINOPHIL # BLD AUTO: 0.54 10*3/MM3 (ref 0–0.4)
EOSINOPHIL NFR BLD AUTO: 4.1 % (ref 0.3–6.2)
ERYTHROCYTE [DISTWIDTH] IN BLOOD BY AUTOMATED COUNT: 16.3 % (ref 12.3–15.4)
GFR SERPL CREATININE-BSD FRML MDRD: 138 ML/MIN/1.73
GLOBULIN UR ELPH-MCNC: 2.6 GM/DL
GLUCOSE BLD-MCNC: 89 MG/DL (ref 65–99)
HCT VFR BLD AUTO: 36.2 % (ref 34–46.6)
HGB BLD-MCNC: 11.8 G/DL (ref 12–15.9)
IMM GRANULOCYTES # BLD AUTO: 0.06 10*3/MM3 (ref 0–0.05)
IMM GRANULOCYTES NFR BLD AUTO: 0.5 % (ref 0–0.5)
LYMPHOCYTES # BLD AUTO: 1.86 10*3/MM3 (ref 0.7–3.1)
LYMPHOCYTES NFR BLD AUTO: 14.2 % (ref 19.6–45.3)
MCH RBC QN AUTO: 31.3 PG (ref 26.6–33)
MCHC RBC AUTO-ENTMCNC: 32.6 G/DL (ref 31.5–35.7)
MCV RBC AUTO: 96 FL (ref 79–97)
MONOCYTES # BLD AUTO: 0.73 10*3/MM3 (ref 0.1–0.9)
MONOCYTES NFR BLD AUTO: 5.6 % (ref 5–12)
NEUTROPHILS # BLD AUTO: 9.9 10*3/MM3 (ref 1.7–7)
NEUTROPHILS NFR BLD AUTO: 75.4 % (ref 42.7–76)
NRBC BLD AUTO-RTO: 0 /100 WBC (ref 0–0.2)
NT-PROBNP SERPL-MCNC: 93.2 PG/ML (ref 5–450)
PLATELET # BLD AUTO: 223 10*3/MM3 (ref 140–450)
PMV BLD AUTO: 9.4 FL (ref 6–12)
POTASSIUM BLD-SCNC: 3.8 MMOL/L (ref 3.5–5.2)
PROCALCITONIN SERPL-MCNC: 0.04 NG/ML (ref 0.1–0.25)
PROT SERPL-MCNC: 6.5 G/DL (ref 6–8.5)
RBC # BLD AUTO: 3.77 10*6/MM3 (ref 3.77–5.28)
SODIUM BLD-SCNC: 140 MMOL/L (ref 136–145)
TROPONIN T SERPL-MCNC: <0.01 NG/ML (ref 0–0.03)
WBC NRBC COR # BLD: 13.12 10*3/MM3 (ref 3.4–10.8)

## 2019-08-05 PROCEDURE — 71275 CT ANGIOGRAPHY CHEST: CPT

## 2019-08-05 PROCEDURE — 85025 COMPLETE CBC W/AUTO DIFF WBC: CPT | Performed by: EMERGENCY MEDICINE

## 2019-08-05 PROCEDURE — 94799 UNLISTED PULMONARY SVC/PX: CPT

## 2019-08-05 PROCEDURE — 83605 ASSAY OF LACTIC ACID: CPT | Performed by: EMERGENCY MEDICINE

## 2019-08-05 PROCEDURE — 25010000002 LORAZEPAM PER 2 MG: Performed by: EMERGENCY MEDICINE

## 2019-08-05 PROCEDURE — 85379 FIBRIN DEGRADATION QUANT: CPT | Performed by: EMERGENCY MEDICINE

## 2019-08-05 PROCEDURE — 0 IOPAMIDOL PER 1 ML: Performed by: EMERGENCY MEDICINE

## 2019-08-05 PROCEDURE — 94640 AIRWAY INHALATION TREATMENT: CPT

## 2019-08-05 PROCEDURE — 84484 ASSAY OF TROPONIN QUANT: CPT | Performed by: EMERGENCY MEDICINE

## 2019-08-05 PROCEDURE — 83880 ASSAY OF NATRIURETIC PEPTIDE: CPT | Performed by: EMERGENCY MEDICINE

## 2019-08-05 PROCEDURE — 71045 X-RAY EXAM CHEST 1 VIEW: CPT

## 2019-08-05 PROCEDURE — 84145 PROCALCITONIN (PCT): CPT | Performed by: EMERGENCY MEDICINE

## 2019-08-05 PROCEDURE — 99285 EMERGENCY DEPT VISIT HI MDM: CPT

## 2019-08-05 PROCEDURE — 80053 COMPREHEN METABOLIC PANEL: CPT | Performed by: EMERGENCY MEDICINE

## 2019-08-05 RX ORDER — IPRATROPIUM BROMIDE AND ALBUTEROL SULFATE 2.5; .5 MG/3ML; MG/3ML
3 SOLUTION RESPIRATORY (INHALATION) ONCE
Status: COMPLETED | OUTPATIENT
Start: 2019-08-05 | End: 2019-08-05

## 2019-08-05 RX ORDER — LORAZEPAM 2 MG/ML
1 INJECTION INTRAMUSCULAR ONCE
Status: COMPLETED | OUTPATIENT
Start: 2019-08-05 | End: 2019-08-05

## 2019-08-05 RX ORDER — SODIUM CHLORIDE 9 MG/ML
75 INJECTION, SOLUTION INTRAVENOUS CONTINUOUS
Status: DISCONTINUED | OUTPATIENT
Start: 2019-08-05 | End: 2019-08-07

## 2019-08-05 RX ORDER — SODIUM CHLORIDE 0.9 % (FLUSH) 0.9 %
10 SYRINGE (ML) INJECTION AS NEEDED
Status: DISCONTINUED | OUTPATIENT
Start: 2019-08-05 | End: 2019-08-09 | Stop reason: HOSPADM

## 2019-08-05 RX ADMIN — SODIUM CHLORIDE 25 ML/HR: 9 INJECTION, SOLUTION INTRAVENOUS at 22:34

## 2019-08-05 RX ADMIN — LORAZEPAM 1 MG: 2 INJECTION INTRAMUSCULAR; INTRAVENOUS at 22:29

## 2019-08-05 RX ADMIN — IOPAMIDOL 95 ML: 755 INJECTION, SOLUTION INTRAVENOUS at 23:52

## 2019-08-05 RX ADMIN — IPRATROPIUM BROMIDE AND ALBUTEROL SULFATE 3 ML: 2.5; .5 SOLUTION RESPIRATORY (INHALATION) at 23:16

## 2019-08-06 ENCOUNTER — APPOINTMENT (OUTPATIENT)
Dept: CARDIOLOGY | Facility: HOSPITAL | Age: 44
End: 2019-08-06

## 2019-08-06 PROBLEM — C78.02 MALIGNANT NEOPLASM METASTATIC TO LEFT LUNG (HCC): Status: ACTIVE | Noted: 2019-08-06

## 2019-08-06 PROBLEM — D64.9 ANEMIA: Status: ACTIVE | Noted: 2019-08-06

## 2019-08-06 PROBLEM — J45.901 MODERATE ASTHMA WITH EXACERBATION: Status: ACTIVE | Noted: 2019-08-06

## 2019-08-06 LAB
B PARAPERT DNA SPEC QL NAA+PROBE: NOT DETECTED
B PERT DNA SPEC QL NAA+PROBE: NOT DETECTED
BH CV ECHO MEAS - ACS: 2.2 CM
BH CV ECHO MEAS - AO MAX PG: 7 MMHG
BH CV ECHO MEAS - AO MEAN PG (FULL): 0 MMHG
BH CV ECHO MEAS - AO MEAN PG: 2 MMHG
BH CV ECHO MEAS - AO ROOT AREA (BSA CORRECTED): 1.6
BH CV ECHO MEAS - AO ROOT AREA: 4.9 CM^2
BH CV ECHO MEAS - AO ROOT DIAM: 2.5 CM
BH CV ECHO MEAS - AO V2 MAX: 130 CM/SEC
BH CV ECHO MEAS - AO V2 MEAN: 69.7 CM/SEC
BH CV ECHO MEAS - AO V2 VTI: 25.5 CM
BH CV ECHO MEAS - AVA(I,A): 2.2 CM^2
BH CV ECHO MEAS - AVA(I,D): 2.2 CM^2
BH CV ECHO MEAS - BSA(HAYCOCK): 1.5 M^2
BH CV ECHO MEAS - BSA: 1.6 M^2
BH CV ECHO MEAS - BZI_BMI: 18.2 KILOGRAMS/M^2
BH CV ECHO MEAS - BZI_METRIC_HEIGHT: 167.6 CM
BH CV ECHO MEAS - BZI_METRIC_WEIGHT: 51.3 KG
BH CV ECHO MEAS - EDV(MOD-SP2): 33 ML
BH CV ECHO MEAS - EDV(MOD-SP4): 26 ML
BH CV ECHO MEAS - EDV(TEICH): 48.5 ML
BH CV ECHO MEAS - EF(CUBED): 75.7 %
BH CV ECHO MEAS - EF(MOD-BP): 66 %
BH CV ECHO MEAS - EF(MOD-SP2): 60.6 %
BH CV ECHO MEAS - EF(MOD-SP4): 69.2 %
BH CV ECHO MEAS - EF(TEICH): 68.8 %
BH CV ECHO MEAS - ESV(MOD-SP2): 13 ML
BH CV ECHO MEAS - ESV(MOD-SP4): 8 ML
BH CV ECHO MEAS - ESV(TEICH): 15.1 ML
BH CV ECHO MEAS - FS: 37.6 %
BH CV ECHO MEAS - IVS/LVPW: 0.97
BH CV ECHO MEAS - IVSD: 0.71 CM
BH CV ECHO MEAS - LAT PEAK E' VEL: 11 CM/SEC
BH CV ECHO MEAS - LV DIASTOLIC VOL/BSA (35-75): 16.6 ML/M^2
BH CV ECHO MEAS - LV MASS(C)D: 62.9 GRAMS
BH CV ECHO MEAS - LV MASS(C)DI: 40.1 GRAMS/M^2
BH CV ECHO MEAS - LV MEAN PG: 2 MMHG
BH CV ECHO MEAS - LV SYSTOLIC VOL/BSA (12-30): 5.1 ML/M^2
BH CV ECHO MEAS - LV V1 MAX: 95 CM/SEC
BH CV ECHO MEAS - LV V1 MEAN: 68.3 CM/SEC
BH CV ECHO MEAS - LV V1 VTI: 18.4 CM
BH CV ECHO MEAS - LVIDD: 3.4 CM
BH CV ECHO MEAS - LVIDS: 2.1 CM
BH CV ECHO MEAS - LVLD AP2: 5 CM
BH CV ECHO MEAS - LVLD AP4: 5 CM
BH CV ECHO MEAS - LVLS AP2: 4.3 CM
BH CV ECHO MEAS - LVLS AP4: 3.9 CM
BH CV ECHO MEAS - LVOT AREA (M): 3.1 CM^2
BH CV ECHO MEAS - LVOT AREA: 3 CM^2
BH CV ECHO MEAS - LVOT DIAM: 2 CM
BH CV ECHO MEAS - LVPWD: 0.73 CM
BH CV ECHO MEAS - MED PEAK E' VEL: 7 CM/SEC
BH CV ECHO MEAS - MR MAX PG: 20.7 MMHG
BH CV ECHO MEAS - MR MAX VEL: 227.5 CM/SEC
BH CV ECHO MEAS - MV A DUR: 0.12 SEC
BH CV ECHO MEAS - MV A MAX VEL: 76.5 CM/SEC
BH CV ECHO MEAS - MV DEC SLOPE: 567 CM/SEC^2
BH CV ECHO MEAS - MV DEC TIME: 0.14 SEC
BH CV ECHO MEAS - MV E MAX VEL: 80.9 CM/SEC
BH CV ECHO MEAS - MV E/A: 1.1
BH CV ECHO MEAS - MV MEAN PG: 3 MMHG
BH CV ECHO MEAS - MV P1/2T MAX VEL: 81.4 CM/SEC
BH CV ECHO MEAS - MV P1/2T: 42 MSEC
BH CV ECHO MEAS - MV V2 MEAN: 81.6 CM/SEC
BH CV ECHO MEAS - MV V2 VTI: 22 CM
BH CV ECHO MEAS - MVA P1/2T LCG: 2.7 CM^2
BH CV ECHO MEAS - MVA(P1/2T): 5.2 CM^2
BH CV ECHO MEAS - MVA(VTI): 2.5 CM^2
BH CV ECHO MEAS - PA MAX PG: 4.8 MMHG
BH CV ECHO MEAS - PA V2 MAX: 109 CM/SEC
BH CV ECHO MEAS - PULM A REVS DUR: 0.1 SEC
BH CV ECHO MEAS - PULM A REVS VEL: 52.1 CM/SEC
BH CV ECHO MEAS - PULM DIAS VEL: 57 CM/SEC
BH CV ECHO MEAS - PULM S/D: 1.4
BH CV ECHO MEAS - PULM SYS VEL: 81.2 CM/SEC
BH CV ECHO MEAS - QP/QS: 0.7
BH CV ECHO MEAS - RAP SYSTOLE: 3 MMHG
BH CV ECHO MEAS - RV MEAN PG: 1 MMHG
BH CV ECHO MEAS - RV V1 MEAN: 44.2 CM/SEC
BH CV ECHO MEAS - RV V1 VTI: 12.3 CM
BH CV ECHO MEAS - RVOT AREA: 3.1 CM^2
BH CV ECHO MEAS - RVOT DIAM: 2 CM
BH CV ECHO MEAS - RVSP: 41 MMHG
BH CV ECHO MEAS - SI(AO): 79.8 ML/M^2
BH CV ECHO MEAS - SI(CUBED): 19.5 ML/M^2
BH CV ECHO MEAS - SI(LVOT): 35 ML/M^2
BH CV ECHO MEAS - SI(MOD-SP2): 12.7 ML/M^2
BH CV ECHO MEAS - SI(MOD-SP4): 11.5 ML/M^2
BH CV ECHO MEAS - SI(TEICH): 21.2 ML/M^2
BH CV ECHO MEAS - SV(AO): 125.2 ML
BH CV ECHO MEAS - SV(CUBED): 30.6 ML
BH CV ECHO MEAS - SV(LVOT): 55 ML
BH CV ECHO MEAS - SV(MOD-SP2): 20 ML
BH CV ECHO MEAS - SV(MOD-SP4): 18 ML
BH CV ECHO MEAS - SV(RVOT): 38.6 ML
BH CV ECHO MEAS - SV(TEICH): 33.3 ML
BH CV ECHO MEAS - TAPSE (>1.6): 1.9 CM2
BH CV ECHO MEAS - TR MAX VEL: 309 CM/SEC
BH CV ECHO MEASUREMENTS AVERAGE E/E' RATIO: 8.99
BH CV XLRA - TDI S': 14 CM/SEC
C PNEUM DNA NPH QL NAA+NON-PROBE: NOT DETECTED
FLUAV H1 2009 PAND RNA NPH QL NAA+PROBE: NOT DETECTED
FLUAV H1 HA GENE NPH QL NAA+PROBE: NOT DETECTED
FLUAV H3 RNA NPH QL NAA+PROBE: NOT DETECTED
FLUAV SUBTYP SPEC NAA+PROBE: NOT DETECTED
FLUBV RNA ISLT QL NAA+PROBE: NOT DETECTED
HADV DNA SPEC NAA+PROBE: NOT DETECTED
HCOV 229E RNA SPEC QL NAA+PROBE: NOT DETECTED
HCOV HKU1 RNA SPEC QL NAA+PROBE: NOT DETECTED
HCOV NL63 RNA SPEC QL NAA+PROBE: NOT DETECTED
HCOV OC43 RNA SPEC QL NAA+PROBE: NOT DETECTED
HMPV RNA NPH QL NAA+NON-PROBE: NOT DETECTED
HPIV1 RNA SPEC QL NAA+PROBE: NOT DETECTED
HPIV2 RNA SPEC QL NAA+PROBE: NOT DETECTED
HPIV3 RNA NPH QL NAA+PROBE: NOT DETECTED
HPIV4 P GENE NPH QL NAA+PROBE: NOT DETECTED
LEFT ATRIUM VOLUME INDEX: 15 ML/M2
M PNEUMO IGG SER IA-ACNC: NOT DETECTED
MAXIMAL PREDICTED HEART RATE: 178 BPM
RHINOVIRUS RNA SPEC NAA+PROBE: NOT DETECTED
RSV RNA NPH QL NAA+NON-PROBE: NOT DETECTED
SINUS: 3.2 CM
STJ: 2.5 CM
STRESS TARGET HR: 151 BPM

## 2019-08-06 PROCEDURE — 93306 TTE W/DOPPLER COMPLETE: CPT

## 2019-08-06 PROCEDURE — 94799 UNLISTED PULMONARY SVC/PX: CPT

## 2019-08-06 PROCEDURE — 99223 1ST HOSP IP/OBS HIGH 75: CPT | Performed by: INTERNAL MEDICINE

## 2019-08-06 PROCEDURE — 25010000002 METHYLPREDNISOLONE PER 125 MG: Performed by: EMERGENCY MEDICINE

## 2019-08-06 PROCEDURE — 93306 TTE W/DOPPLER COMPLETE: CPT | Performed by: INTERNAL MEDICINE

## 2019-08-06 PROCEDURE — 0100U HC BIOFIRE FILMARRAY RESP PANEL 2: CPT | Performed by: HOSPITALIST

## 2019-08-06 RX ORDER — LORAZEPAM 2 MG/ML
1 INJECTION INTRAMUSCULAR ONCE
Status: DISCONTINUED | OUTPATIENT
Start: 2019-08-06 | End: 2019-08-06

## 2019-08-06 RX ORDER — MELATONIN
2000 DAILY
Status: DISCONTINUED | OUTPATIENT
Start: 2019-08-06 | End: 2019-08-09 | Stop reason: HOSPADM

## 2019-08-06 RX ORDER — MORPHINE SULFATE 15 MG/1
15 TABLET, FILM COATED, EXTENDED RELEASE ORAL EVERY 12 HOURS SCHEDULED
Status: DISCONTINUED | OUTPATIENT
Start: 2019-08-06 | End: 2019-08-09 | Stop reason: HOSPADM

## 2019-08-06 RX ORDER — MORPHINE SULFATE 20 MG/ML
10 SOLUTION ORAL
Status: DISCONTINUED | OUTPATIENT
Start: 2019-08-06 | End: 2019-08-06

## 2019-08-06 RX ORDER — MIRTAZAPINE 15 MG/1
15 TABLET, FILM COATED ORAL NIGHTLY
Status: DISCONTINUED | OUTPATIENT
Start: 2019-08-06 | End: 2019-08-09 | Stop reason: HOSPADM

## 2019-08-06 RX ORDER — LORAZEPAM 0.5 MG/1
0.5 TABLET ORAL EVERY 6 HOURS PRN
Status: DISCONTINUED | OUTPATIENT
Start: 2019-08-06 | End: 2019-08-09 | Stop reason: HOSPADM

## 2019-08-06 RX ORDER — ALPRAZOLAM 0.25 MG/1
0.25 TABLET ORAL 2 TIMES DAILY PRN
Status: DISCONTINUED | OUTPATIENT
Start: 2019-08-06 | End: 2019-08-06

## 2019-08-06 RX ORDER — METHYLPREDNISOLONE SODIUM SUCCINATE 125 MG/2ML
125 INJECTION, POWDER, LYOPHILIZED, FOR SOLUTION INTRAMUSCULAR; INTRAVENOUS ONCE
Status: COMPLETED | OUTPATIENT
Start: 2019-08-06 | End: 2019-08-06

## 2019-08-06 RX ORDER — IPRATROPIUM BROMIDE AND ALBUTEROL SULFATE 2.5; .5 MG/3ML; MG/3ML
3 SOLUTION RESPIRATORY (INHALATION)
Status: DISCONTINUED | OUTPATIENT
Start: 2019-08-06 | End: 2019-08-09 | Stop reason: HOSPADM

## 2019-08-06 RX ORDER — PANTOPRAZOLE SODIUM 40 MG/1
40 TABLET, DELAYED RELEASE ORAL
Status: DISCONTINUED | OUTPATIENT
Start: 2019-08-06 | End: 2019-08-09 | Stop reason: HOSPADM

## 2019-08-06 RX ORDER — MORPHINE SULFATE 20 MG/ML
20 SOLUTION ORAL
Status: DISCONTINUED | OUTPATIENT
Start: 2019-08-06 | End: 2019-08-09 | Stop reason: HOSPADM

## 2019-08-06 RX ORDER — AMOXICILLIN 250 MG
2 CAPSULE ORAL 2 TIMES DAILY PRN
Status: DISCONTINUED | OUTPATIENT
Start: 2019-08-06 | End: 2019-08-06

## 2019-08-06 RX ORDER — ONDANSETRON 4 MG/1
8 TABLET, FILM COATED ORAL EVERY 8 HOURS PRN
Status: DISCONTINUED | OUTPATIENT
Start: 2019-08-06 | End: 2019-08-09 | Stop reason: HOSPADM

## 2019-08-06 RX ORDER — METHYLPREDNISOLONE SODIUM SUCCINATE 125 MG/2ML
125 INJECTION, POWDER, LYOPHILIZED, FOR SOLUTION INTRAMUSCULAR; INTRAVENOUS EVERY 6 HOURS
Status: DISCONTINUED | OUTPATIENT
Start: 2019-08-06 | End: 2019-08-06

## 2019-08-06 RX ORDER — GUAIFENESIN 600 MG/1
600 TABLET, EXTENDED RELEASE ORAL EVERY 12 HOURS SCHEDULED
Status: DISCONTINUED | OUTPATIENT
Start: 2019-08-06 | End: 2019-08-09 | Stop reason: HOSPADM

## 2019-08-06 RX ORDER — METHYLPREDNISOLONE SODIUM SUCCINATE 125 MG/2ML
125 INJECTION, POWDER, LYOPHILIZED, FOR SOLUTION INTRAMUSCULAR; INTRAVENOUS ONCE
Status: DISCONTINUED | OUTPATIENT
Start: 2019-08-06 | End: 2019-08-06

## 2019-08-06 RX ORDER — SENNOSIDES 8.6 MG
2 TABLET ORAL 2 TIMES DAILY PRN
Status: DISCONTINUED | OUTPATIENT
Start: 2019-08-06 | End: 2019-08-09 | Stop reason: HOSPADM

## 2019-08-06 RX ORDER — DOCUSATE SODIUM 100 MG/1
100 CAPSULE, LIQUID FILLED ORAL 2 TIMES DAILY
Status: DISCONTINUED | OUTPATIENT
Start: 2019-08-06 | End: 2019-08-09 | Stop reason: HOSPADM

## 2019-08-06 RX ORDER — SACCHAROMYCES BOULARDII 250 MG
250 CAPSULE ORAL 2 TIMES DAILY
Status: DISCONTINUED | OUTPATIENT
Start: 2019-08-06 | End: 2019-08-09 | Stop reason: HOSPADM

## 2019-08-06 RX ORDER — MORPHINE SULFATE 20 MG/ML
10 SOLUTION ORAL
Status: DISCONTINUED | OUTPATIENT
Start: 2019-08-06 | End: 2019-08-09 | Stop reason: HOSPADM

## 2019-08-06 RX ORDER — CETIRIZINE HYDROCHLORIDE 10 MG/1
10 TABLET ORAL DAILY
Status: DISCONTINUED | OUTPATIENT
Start: 2019-08-06 | End: 2019-08-09 | Stop reason: HOSPADM

## 2019-08-06 RX ORDER — IPRATROPIUM BROMIDE AND ALBUTEROL SULFATE 2.5; .5 MG/3ML; MG/3ML
3 SOLUTION RESPIRATORY (INHALATION) ONCE
Status: DISCONTINUED | OUTPATIENT
Start: 2019-08-06 | End: 2019-08-06

## 2019-08-06 RX ORDER — GABAPENTIN 100 MG/1
100 CAPSULE ORAL 2 TIMES DAILY
Status: DISCONTINUED | OUTPATIENT
Start: 2019-08-06 | End: 2019-08-09 | Stop reason: HOSPADM

## 2019-08-06 RX ORDER — MULTIVIT-MIN/IRON/FOLIC ACID/K 18-600-40
2000 CAPSULE ORAL DAILY
Status: DISCONTINUED | OUTPATIENT
Start: 2019-08-06 | End: 2019-08-07

## 2019-08-06 RX ORDER — PROCHLORPERAZINE MALEATE 10 MG
10 TABLET ORAL EVERY 6 HOURS PRN
Status: DISCONTINUED | OUTPATIENT
Start: 2019-08-06 | End: 2019-08-09 | Stop reason: HOSPADM

## 2019-08-06 RX ORDER — BUDESONIDE AND FORMOTEROL FUMARATE DIHYDRATE 160; 4.5 UG/1; UG/1
2 AEROSOL RESPIRATORY (INHALATION)
Status: DISCONTINUED | OUTPATIENT
Start: 2019-08-06 | End: 2019-08-09 | Stop reason: HOSPADM

## 2019-08-06 RX ORDER — CHOLECALCIFEROL (VITAMIN D3) 125 MCG
1000 CAPSULE ORAL DAILY
Status: DISCONTINUED | OUTPATIENT
Start: 2019-08-06 | End: 2019-08-09 | Stop reason: HOSPADM

## 2019-08-06 RX ORDER — METHYLPREDNISOLONE SODIUM SUCCINATE 125 MG/2ML
80 INJECTION, POWDER, LYOPHILIZED, FOR SOLUTION INTRAMUSCULAR; INTRAVENOUS EVERY 8 HOURS
Status: DISCONTINUED | OUTPATIENT
Start: 2019-08-06 | End: 2019-08-06

## 2019-08-06 RX ORDER — IPRATROPIUM BROMIDE AND ALBUTEROL SULFATE 2.5; .5 MG/3ML; MG/3ML
3 SOLUTION RESPIRATORY (INHALATION) EVERY 6 HOURS PRN
Status: DISCONTINUED | OUTPATIENT
Start: 2019-08-06 | End: 2019-08-09 | Stop reason: HOSPADM

## 2019-08-06 RX ADMIN — GABAPENTIN 100 MG: 100 CAPSULE ORAL at 07:00

## 2019-08-06 RX ADMIN — METHYLPREDNISOLONE SODIUM SUCCINATE 125 MG: 125 INJECTION, POWDER, FOR SOLUTION INTRAMUSCULAR; INTRAVENOUS at 00:35

## 2019-08-06 RX ADMIN — IPRATROPIUM BROMIDE AND ALBUTEROL SULFATE 3 ML: 2.5; .5 SOLUTION RESPIRATORY (INHALATION) at 11:21

## 2019-08-06 RX ADMIN — CETIRIZINE HYDROCHLORIDE 10 MG: 10 TABLET, FILM COATED ORAL at 14:28

## 2019-08-06 RX ADMIN — IPRATROPIUM BROMIDE AND ALBUTEROL SULFATE 3 ML: 2.5; .5 SOLUTION RESPIRATORY (INHALATION) at 23:52

## 2019-08-06 RX ADMIN — VITAMIN D, TAB 1000IU (100/BT) 2000 UNITS: 25 TAB at 14:27

## 2019-08-06 RX ADMIN — SENNOSIDES 17.2 MG: 8.6 TABLET, FILM COATED ORAL at 21:08

## 2019-08-06 RX ADMIN — MORPHINE SULFATE 15 MG: 15 TABLET, FILM COATED, EXTENDED RELEASE ORAL at 20:06

## 2019-08-06 RX ADMIN — DOCUSATE SODIUM 100 MG: 100 CAPSULE, LIQUID FILLED ORAL at 14:29

## 2019-08-06 RX ADMIN — PANTOPRAZOLE SODIUM 40 MG: 40 TABLET, DELAYED RELEASE ORAL at 06:53

## 2019-08-06 RX ADMIN — Medication 250 MG: at 14:28

## 2019-08-06 RX ADMIN — GABAPENTIN 100 MG: 100 CAPSULE ORAL at 20:06

## 2019-08-06 RX ADMIN — MORPHINE SULFATE 15 MG: 15 TABLET, FILM COATED, EXTENDED RELEASE ORAL at 06:59

## 2019-08-06 RX ADMIN — GUAIFENESIN 600 MG: 600 TABLET, EXTENDED RELEASE ORAL at 14:28

## 2019-08-06 RX ADMIN — LORAZEPAM 0.5 MG: 0.5 TABLET ORAL at 20:06

## 2019-08-06 RX ADMIN — IPRATROPIUM BROMIDE AND ALBUTEROL SULFATE 3 ML: 2.5; .5 SOLUTION RESPIRATORY (INHALATION) at 15:22

## 2019-08-06 RX ADMIN — SODIUM CHLORIDE 75 ML/HR: 9 INJECTION, SOLUTION INTRAVENOUS at 20:18

## 2019-08-06 RX ADMIN — IPRATROPIUM BROMIDE AND ALBUTEROL SULFATE 3 ML: 2.5; .5 SOLUTION RESPIRATORY (INHALATION) at 07:26

## 2019-08-06 RX ADMIN — Medication 1000 MCG: at 14:28

## 2019-08-06 RX ADMIN — IPRATROPIUM BROMIDE AND ALBUTEROL SULFATE 3 ML: 2.5; .5 SOLUTION RESPIRATORY (INHALATION) at 20:23

## 2019-08-06 RX ADMIN — MIRTAZAPINE 15 MG: 15 TABLET, FILM COATED ORAL at 21:31

## 2019-08-06 RX ADMIN — DOCUSATE SODIUM 100 MG: 100 CAPSULE, LIQUID FILLED ORAL at 20:07

## 2019-08-06 RX ADMIN — CALCIUM CARBONATE-VITAMIN D TAB 500 MG-200 UNIT 1000 MG: 500-200 TAB at 14:27

## 2019-08-06 NOTE — PLAN OF CARE
Problem: Fall Risk (Adult)  Goal: Identify Related Risk Factors and Signs and Symptoms  Outcome: Ongoing (interventions implemented as appropriate)   08/06/19 0519   Fall Risk (Adult)   Related Risk Factors (Fall Risk) culprit medication(s);fatigue/slow reaction;gait/mobility problems;polypharmacy;sensory deficits;environment unfamiliar   Signs and Symptoms (Fall Risk) presence of risk factors       Problem: Patient Care Overview  Goal: Plan of Care Review  Outcome: Ongoing (interventions implemented as appropriate)   08/06/19 0519   Coping/Psychosocial   Plan of Care Reviewed With patient   Plan of Care Review   Progress no change   OTHER   Outcome Summary no soa on admit to floor oxygen on at 2 liters lungs with coarse rhonchi sleeping mostly no productive cough        Problem: Skin Injury Risk (Adult)  Goal: Identify Related Risk Factors and Signs and Symptoms  Outcome: Ongoing (interventions implemented as appropriate)   08/06/19 0519   Skin Injury Risk (Adult)   Related Risk Factors (Skin Injury Risk) body weight extremes;critical care admission;mechanical forces;mobility impaired;nutritional deficiencies       Problem: Pain, Chronic (Adult)  Goal: Identify Related Risk Factors and Signs and Symptoms   08/06/19 0519   Pain, Chronic (Adult)   Related Risk Factors (Chronic Pain) disease process;tumor progression   Signs and Symptoms (Chronic Pain) sleep pattern change;verbalization of pain descriptors;verbalization of pain/discomfort for a prolonged time period       Problem: Asthma (Adult)  Goal: Signs and Symptoms of Listed Potential Problems Will be Absent, Minimized or Managed (Asthma)  Outcome: Ongoing (interventions implemented as appropriate)   08/06/19 0519   Goal/Outcome Evaluation   Problems Assessed (Asthma) all   Problems Present (Asthma) situational response;hypoxia/hypoxemia

## 2019-08-06 NOTE — H&P
History and physical    Primary care physician  Dr. MORALES    Chief complaint  Shortness of breath    History of present illness  44-year-old white female with history of colorectal CA stage IV with mets to the lung and also have asthma presented to [facility] emergency room with shortness of breath for last 1 week with nonproductive cough.  Patient has been wheezing.  Patient denies any fever chills chest pain abdominal pain nausea vomiting diarrhea.  Patient evaluated in ER found to be acute asthma flare admit for management.    PAST MEDICAL HISTORY  •  Rectal CA stage IV     • Pneumothorax  Neuropathy  Chronic pain syndrome  Asthma  Gastroesophageal reflux disease        PAST SURGICAL HISTORY  Surgical History         Past Surgical History:   Procedure Laterality Date   • CHEST TUBE INSERTION       • COLON RESECTION         COLORECTAL RESECTION   • COLONOSCOPY   2014   • COLONOSCOPY N/A 7/8/2019     Procedure: COLONOSCOPY to cecum;  Surgeon: Kate Arroyo MD;  Location: [facility] ENDOSCOPY;  Service: Gastroenterology   • ENDOSCOPY N/A 7/8/2019     Procedure: ESOPHAGOGASTRODUODENOSCOPY with cold biopsies;  Surgeon: Kate Arroyo MD;  Location: [facility] ENDOSCOPY;  Service: Gastroenterology   • KYPHOPLASTY Bilateral 6/21/2019     Procedure: KYPHOPLASTY 1-2 LEVELS, Lumbar 3, osteocool ablation at Lumbar 3;  Surgeon: Kamran Benjamin IV, MD;  Location: [facility] HYBRID OR 18/19;  Service: Neurosurgery   • OVARY SURGERY Right 04/2019     Right Ovary and Falopian tube removed         FAMILY HISTORY        Family History   Problem Relation Age of Onset   • Colon polyps Mother     • Colon polyps Sister     • Diverticulitis Maternal Grandfather     • Stomach cancer Maternal Grandfather     • Colon polyps Cousin     • Cancer Neg Hx     • Malig Hyperthermia Neg Hx        SOCIAL HISTORY  Social History   Social History            Socioeconomic History   • Marital status:        Spouse name: Not on file  "  • Number of children: Not on file   • Years of education: Not on file   • Highest education level: Not on file   Occupational History   • Occupation: housewife   Tobacco Use   • Smoking status: Never Smoker   • Smokeless tobacco: Never Used   Substance and Sexual Activity   • Alcohol use: No       Frequency: Never   • Drug use: Yes       Types: Morphine   • Sexual activity: Defer         ALLERGIES  Patient has no known allergies.  Home medications reviewed     REVIEW OF SYSTEMS  Constitutional: Negative for fever.   HENT: Negative for sore throat.    Eyes: Negative.    Respiratory: Positive for shortness of breath. Negative for cough.    Cardiovascular: Negative for chest pain and leg swelling.   Gastrointestinal: Negative for abdominal pain, diarrhea and vomiting.   Genitourinary: Negative for dysuria.   Musculoskeletal: Negative for myalgias (leg) and neck pain.   Skin: Negative for rash.   Allergic/Immunologic: Negative.    Neurological: Negative for weakness, numbness and headaches.   Hematological: Negative.    Psychiatric/Behavioral: Negative.    All other systems reviewed and are negative.     PHYSICAL EXAM  Blood pressure 113/77, pulse 96, temperature 98.1 °F (36.7 °C), temperature source Oral, resp. rate 18, height 167.6 cm (66\"), weight 51.4 kg (113 lb 5.1 oz), SpO2 100 %, not currently breastfeeding.    GENERAL: well developed, well nourished in moderate distress and emotional  HENT: NCAT, neck supple, trachea midline  EYES: no scleral icterus, PERRL, normal conjunctiva  CV: Tachycardic, regular rate, no murmur, power port in right upper chest  RESPIRATORY: Moderate respiratory distress, diffuse wheezing, tachypnea  ABDOMEN: soft, non-tender, non-distended, bowel sounds present  MUSCULOSKELETAL: no gross deformity  NEURO: alert,  sensory and motor function of extremities grossly intact, speech clear, mental status normal/baseline  SKIN: warm, dry, no rash  PSYCH: she is anxious and emotional    LAB " "RESULTS  Lab Results (last 24 hours)     Procedure Component Value Units Date/Time    Comprehensive Metabolic Panel [[id number]]  (Abnormal) Collected:  08/05/19 2228    Specimen:  Blood Updated:  08/05/19 2332     Glucose 89 mg/dL      BUN 7 mg/dL      Creatinine 0.49 mg/dL      Sodium 140 mmol/L      Potassium 3.8 mmol/L      Chloride 107 mmol/L      CO2 23.9 mmol/L      Calcium 8.7 mg/dL      Total Protein 6.5 g/dL      Albumin 3.90 g/dL      ALT (SGPT) 8 U/L      AST (SGOT) 19 U/L      Alkaline Phosphatase 73 U/L      Total Bilirubin 0.3 mg/dL      eGFR Non African Amer 138 mL/min/1.73      Globulin 2.6 gm/dL      A/G Ratio 1.5 g/dL      BUN/Creatinine Ratio 14.3     Anion Gap 9.1 mmol/L     Narrative:       GFR Normal >60  Chronic Kidney Disease <60  Kidney Failure <15    Procalcitonin [[id number]]  (Abnormal) Collected:  08/05/19 2228    Specimen:  Blood Updated:  08/05/19 2327     Procalcitonin 0.04 ng/mL     Narrative:       As a Marker for Sepsis (Non-Neonates):   1. <0.5 ng/mL represents a low risk of severe sepsis and/or septic shock.  1. >2 ng/mL represents a high risk of severe sepsis and/or septic shock.    As a Marker for Lower Respiratory Tract Infections that require antibiotic therapy:  PCT on Admission     Antibiotic Therapy             6-12 Hrs later  > 0.5                Strongly Recommended            >0.25 - <0.5         Recommended  0.1 - 0.25           Discouraged                   Remeasure/reassess PCT  <0.1                 Strongly Discouraged          Remeasure/reassess PCT      As 28 day mortality risk marker: \"Change in Procalcitonin Result\" (> 80 % or <=80 %) if Day 0 (or Day 1) and Day 4 values are available. Refer to http://www.[employer]-pct-calculator.com/   Change in PCT <=80 %   A decrease of PCT levels below or equal to 80 % defines a positive change in PCT test result representing a higher risk for 28-day all-cause mortality of patients diagnosed with severe sepsis or septic " shock.  Change in PCT > 80 %   A decrease of PCT levels of more than 80 % defines a negative change in PCT result representing a lower risk for 28-day all-cause mortality of patients diagnosed with severe sepsis or septic shock.                Troponin [[id number]]  (Normal) Collected:  08/05/19 2228    Specimen:  Blood Updated:  08/05/19 2326     Troponin T <0.010 ng/mL     Narrative:       Troponin T Reference Range:  <= 0.03 ng/mL-   Negative for AMI  >0.03 ng/mL-     Abnormal for myocardial necrosis.  Clinicians would have to utilize clinical acumen, EKG, Troponin and serial changes to determine if it is an Acute Myocardial Infarction or myocardial injury due to an underlying chronic condition.     BNP [[id number]]  (Normal) Collected:  08/05/19 2228    Specimen:  Blood Updated:  08/05/19 2324     proBNP 93.2 pg/mL     Narrative:       Among patients with dyspnea, NT-proBNP is highly sensitive for the detection of acute congestive heart failure. In addition NT-proBNP of <300 pg/ml effectively rules out acute congestive heart failure with 99% negative predictive value.    D-dimer, Quantitative [[id number]]  (Abnormal) Collected:  08/05/19 2228    Specimen:  Blood Updated:  08/05/19 2318     D-Dimer, Quantitative 1.82 MCGFEU/mL     Narrative:       The Stago D-Dimer test used in conjunction with a clinical pretest probability (PTP) assessment model, has been approved by the FDA to rule out the presence of venous thromboembolism (VTE) in outpatients suspected of deep venous thrombosis (DVT) or pulmonary embolism (PE). The cut-off for negative predictive value is <0.50 MCGFEU/mL.    CBC & Differential [[id number]] Collected:  08/05/19 2228    Specimen:  Blood Updated:  08/05/19 2317    Narrative:       The following orders were created for panel order CBC & Differential.  Procedure                               Abnormality         Status                     ---------                               -----------          ------                     CBC Auto Differential[[id number]]        Abnormal            Final result                 Please view results for these tests on the individual orders.    CBC Auto Differential [[id number]]  (Abnormal) Collected:  08/05/19 2228    Specimen:  Blood Updated:  08/05/19 2317     WBC 13.12 10*3/mm3      RBC 3.77 10*6/mm3      Hemoglobin 11.8 g/dL      Hematocrit 36.2 %      MCV 96.0 fL      MCH 31.3 pg      MCHC 32.6 g/dL      RDW 16.3 %      RDW-SD 57.1 fl      MPV 9.4 fL      Platelets 223 10*3/mm3      Neutrophil % 75.4 %      Lymphocyte % 14.2 %      Monocyte % 5.6 %      Eosinophil % 4.1 %      Basophil % 0.2 %      Immature Grans % 0.5 %      Neutrophils, Absolute 9.90 10*3/mm3      Lymphocytes, Absolute 1.86 10*3/mm3      Monocytes, Absolute 0.73 10*3/mm3      Eosinophils, Absolute 0.54 10*3/mm3      Basophils, Absolute 0.03 10*3/mm3      Immature Grans, Absolute 0.06 10*3/mm3      nRBC 0.0 /100 WBC     Lactic Acid, Plasma [[id number]]  (Abnormal) Collected:  08/05/19 2228    Specimen:  Blood Updated:  08/05/19 2303     Lactate 0.4 mmol/L         Imaging Results (last 24 hours)     Procedure Component Value Units Date/Time    CT Angiogram Chest With Contrast [[id number]] Collected:  08/06/19 0008     Updated:  08/06/19 0020    Narrative:       CT ANGIOGRAM OF THE CHEST     HISTORY: Shortness of air. Distorted week ago.     COMPARISON: 06/16/2019     TECHNIQUE: Axial CT imaging was obtained from the thoracic inlet to the  diaphragm. IV contrast is administered. 3-D reformatted images were  obtained.     FINDINGS:  No acute pulmonary thromboembolus is seen. The thoracic aorta measures  within normal size limits. There is no evidence of dissection. Right  internal jugular vein Mediport appears to terminate at the cavoatrial  junction. The thyroid gland and trachea appear within normal limits.  There is a small hiatal hernia. Mediastinal lymph nodes do not appear  pathologically enlarged.  Innumerable reticulonodular infiltrates are  seen throughout both lungs. These appear to have worsened when compared  to prior exam. For example, an area within the right lower lobe measures  up to 6.6 x 3.27 mm, previously 6.0 x 3.0 cm. I suspect this reflects  progression of disease, but a superimposed infectious or inflammatory  process is not excluded. No acute abnormalities are seen within the  upper abdomen. There are trace bilateral pleural effusions. No  aggressive osseous abnormalities are seen.       Impression:          1. No acute pulmonary thromboembolus seen.  2. There has been some increase in extensive areas of conglomerate  nodularity seen throughout both lungs. This is favored to reflect  progression of disease, although superimposed infectious or inflammatory  infiltrate is completely excluded.     Radiation dose reduction techniques were utilized, including automated  exposure control and exposure modulation based on body size.     This report was finalized on 8/6/2019 12:16 AM by Dr. Estella Jean-Baptiste M.D.       XR Chest 1 View [[id number]] Collected:  08/05/19 2211     Updated:  08/05/19 2217    Narrative:       PORTABLE CHEST RADIOGRAPH     HISTORY: Shortness of air     COMPARISON: 07/22/2019     FINDINGS:  Right internal jugular vein Mediport terminates within the right atrium.  Multifocal areas of consolidation are seen throughout both lungs.  Similar findings were present on the prior exam. I did not think they  appear significantly changed when compared to the prior study. No  pneumothorax is identified. There may be some loculated pleural fluid  bilaterally. However, similar findings were present on prior CT.       Impression:       Multifocal areas of consolidation in this patient with a known history  of metastatic disease to the thorax. Appearance is grossly stable when  compared to most recent chest radiograph.     This report was finalized on 8/5/2019 10:13 PM by Dr. Soria  SHANTA Jean-Baptiste.               Current Facility-Administered Medications:   •  budesonide-formoterol (SYMBICORT) 160-4.5 MCG/ACT inhaler 2 puff, 2 puff, Inhalation, BID - RT, James Loza MD  •  calcium-vitamin D 500-200 MG-UNIT tablet 1,000 mg, 1,000 mg, Oral, Daily, James Loza MD  •  cetirizine (zyrTEC) tablet 10 mg, 10 mg, Oral, Daily, James Loza MD  •  docusate sodium (COLACE) capsule 100 mg, 100 mg, Oral, BID, James Loza MD  •  gabapentin (NEURONTIN) capsule 100 mg, 100 mg, Oral, BID, James Loza MD, 100 mg at 08/06/19 0700  •  ipratropium-albuterol (DUO-NEB) nebulizer solution 3 mL, 3 mL, Nebulization, Q4H - RT, James Loza MD, 3 mL at 08/06/19 1121  •  methylPREDNISolone sodium succinate (SOLU-Medrol) injection 80 mg, 80 mg, Intravenous, Q8H, James Loza MD  •  mirtazapine (REMERON) tablet 15 mg, 15 mg, Oral, Nightly, James Loza MD  •  Morphine (MS CONTIN) 12 hr tablet 15 mg, 15 mg, Oral, Q12H, James Loza MD, 15 mg at 08/06/19 0659  •  morphine concentrated solution solution 10 mg, 10 mg, Oral, Q2H PRN **OR** morphine concentrated solution solution 20 mg, 20 mg, Oral, Q2H PRN, James Loza MD  •  ondansetron (ZOFRAN) tablet 8 mg, 8 mg, Oral, Q8H PRN, James Loza MD  •  pantoprazole (PROTONIX) EC tablet 40 mg, 40 mg, Oral, Q AM, James Loza MD, 40 mg at 08/06/19 0653  •  prochlorperazine (COMPAZINE) tablet 10 mg, 10 mg, Oral, Q6H PRN, James Loza MD  •  saccharomyces boulardii (FLORASTOR) capsule 250 mg, 250 mg, Oral, BID, James Loza MD  •  [COMPLETED] Insert peripheral IV, , , Once **AND** sodium chloride 0.9 % flush 10 mL, 10 mL, Intravenous, PRN, Dimitri Whitman MD  •  sodium chloride 0.9 % infusion, 75 mL/hr, Intravenous, Continuous, James Loza MD, Last Rate: 25 mL/hr at 08/05/19 2234, 25 mL/hr at 08/05/19 2234  •  vitamin B-12 (CYANOCOBALAMIN) tablet 1,000 mcg, 1,000 mcg, Oral, Daily, James Loza MD  •  Vitamin D capsule 2,000 Units, 2,000 Units, Oral, Daily, Denia  MD Mildred     ASSESSMENT  Acute exacerbation of asthma  Metastatic CA to the lung  Colorectal CA  Neuropathy  Chronic pain syndrome  Chronic anemia  Gastroesophageal reflux disease    PLAN  Admit  Nebulizer and IV steroids  Hold onto antibiotics  Continue home medications  Pulmonary and oncology to follow patient  Stress ulcer DVT prophylaxis  Supportive care  Patient is full code  Follow closely further recommendation according to hospital course    MILDRED PANDYA MD

## 2019-08-06 NOTE — ED NOTES
Pt reports SOA that began a week ago, patient has stage 4 cancer that started as rectal cancer, now has spread to her lungs.      Leeanne Corcoran, MASSIEL  08/05/19 2044

## 2019-08-06 NOTE — ED NOTES
Pt c/o SOB x 1 week. Pt on immunotherapy treatment, last treatment 7/31 . Pt on 2L NC occasionally at home. Pt fell on Saturday.     Pt with lung biopsy last week.     Pt hx asthma, mets to lung and anemia     Marily Day, RN  08/05/19 2097

## 2019-08-06 NOTE — PLAN OF CARE
"Problem: Fall Risk (Adult)  Goal: Identify Related Risk Factors and Signs and Symptoms  Outcome: Ongoing (interventions implemented as appropriate)   08/06/19 1752   Fall Risk (Adult)   Related Risk Factors (Fall Risk) environment unfamiliar   Signs and Symptoms (Fall Risk) presence of risk factors       Problem: Patient Care Overview  Goal: Plan of Care Review  Outcome: Ongoing (interventions implemented as appropriate)   08/06/19 1752   Coping/Psychosocial   Plan of Care Reviewed With patient   Plan of Care Review   Progress no change   OTHER   Outcome Summary VSS, pt with lots of anxiety. All day calling out frequently to ask for a bigger room. Pt told that there were no other rooms available and as soon as one became available we would move her to make her more comfortable. Pt became more and more anxious over room situation and the fact that the doctors hadn't told her a reason for her SOA yet. Pt educated that test results take time and nothing happens immediately, which she would like. AAfter patient ambulated to the restroom she began screaming out. Staff went to room and pt was laying in bed gasping for air yelling that she couldn't breathe. O2 sats on 2LNC remained >95%. Pt became very upset when staff told her that part of her breathing issues might be related to anxiety. She stated \" I want a doctor to come see me now! No one has done anything for me today. I want someone now!\" Charge RN was notified and MD was called, pt recieved PRN breathing treatment but refused anxiety meds stating \"I have enough of those at home\". Plan to move patient to [address] in hopes a bigger room might appease her. Will continue to monitor       Problem: Skin Injury Risk (Adult)  Goal: Identify Related Risk Factors and Signs and Symptoms  Outcome: Ongoing (interventions implemented as appropriate)   08/06/19 1752   Skin Injury Risk (Adult)   Related Risk Factors (Skin Injury Risk) mobility impaired       Problem: Pain, Chronic " (Adult)  Goal: Identify Related Risk Factors and Signs and Symptoms  Outcome: Ongoing (interventions implemented as appropriate)   08/06/19 1752   Pain, Chronic (Adult)   Related Risk Factors (Chronic Pain) disease process   Signs and Symptoms (Chronic Pain) verbalization of pain descriptors       Problem: Asthma (Adult)  Goal: Signs and Symptoms of Listed Potential Problems Will be Absent, Minimized or Managed (Asthma)  Outcome: Ongoing (interventions implemented as appropriate)   08/06/19 1752   Goal/Outcome Evaluation   Problems Assessed (Asthma) all   Problems Present (Asthma) none

## 2019-08-06 NOTE — CONSULTS
[facility]    Reason for Consult: asthma with acute exacerbation.    HPI:  Mrs. Ren is a 45yo WF with a history of metastatic colon cancer currently getting immunotherapy.  She presented to  ER 8/5 with one week history of gradually worsening, moderate, constant shortness of breath. Worse on exertion.  Some minor relief with bronchodilators at home.  She has had increased cough as well. CT angio demonstrated no pulmonary embolism, but did show extensive metastatic disease.  She says her air conditioning recently went out and her father is having similar problems. She is very anxious.  She has been wheezing.     Past Medical History:   Diagnosis Date   • Cancer (CMS/HCC)     METASTATIC STAGE 4 COLORECTAL TO LIVER AND LUNGS   • Pneumothorax     RIGHT LUNG     Social History     Socioeconomic History   • Marital status:      Spouse name: Not on file   • Number of children: Not on file   • Years of education: Not on file   • Highest education level: Not on file   Occupational History   • Occupation: housewife   Tobacco Use   • Smoking status: Never Smoker   • Smokeless tobacco: Never Used   Substance and Sexual Activity   • Alcohol use: No     Frequency: Never   • Drug use: Yes     Types: Morphine, Marijuana     Comment: gummy   • Sexual activity: Defer     Family History   Problem Relation Age of Onset   • Colon polyps Mother    • Colon polyps Sister    • Diverticulitis Maternal Grandfather    • Stomach cancer Maternal Grandfather    • Colon polyps Cousin    • Cancer Neg Hx    • Malig Hyperthermia Neg Hx      MEDS: reviewed as per chart  ALL: nkda  ROS: reviewed below:  Constitutional: Negative for fever.   HENT: Negative for sore throat.    Eyes: Negative.    Respiratory: Positive for shortness of breath. Negative for cough.    Cardiovascular: Negative for chest pain and leg swelling.   Gastrointestinal: Negative for abdominal pain, diarrhea and vomiting.   Genitourinary: Negative for  "dysuria.   Musculoskeletal: Negative for myalgias (leg) and neck pain.        (+) \"muscle wasting\"   Skin: Negative for rash.   Allergic/Immunologic: Negative.    Neurological: Negative for weakness, numbness and headaches.   Hematological: Negative.    Psychiatric/Behavioral: Negative.    All other systems reviewed and are negative.    Vital Sign Min/Max for last 24 hours  Temp  Min: 97.4 °F (36.3 °C)  Max: 98.6 °F (37 °C)   BP  Min: 96/65  Max: 117/77   Pulse  Min: 89  Max: 107   Resp  Min: 14  Max: 20   SpO2  Min: 96 %  Max: 100 %   Flow (L/min)  Min: 2  Max: 2   Weight  Min: 51.3 kg (113 lb)  Max: 51.4 kg (113 lb 5.1 oz)     GEN:   appears ill, AxOx3  HEENT: PERRL, EOMI, no icterus, mmm, no jvd, trachea midline, neck supple  CHEST: decreased bilat, +wheezes but only on the right, + crackles, no use of accessory muscles  CV: RRR, no m/g/r  ABD: soft, nt, nd +bs, no hepatosplenomegaly  EXT: no c/c/e  SKIN: no rashes, no xanthomas, nl turgor  LYMPH: no palpable cervical or supraclavicular lymphadenopathy  NEURO: CN 2-12 intact and symmetric bilaterally  PSYCH: nl affect, nl orientation, nl judgement, nl mood  MSK: no kyphoscoliosis, 5/5 strength ue and le bilaterally    Labs: 8/5: reviewed:  D dimer 1.82  bnp 93  Wbc 13.12 (4.1% eos)  hgb 11.8  plts 223  Cr 0.49  Na 140  Bicarb 23.9  procal 0.04  Lactate 0.4  CT chest: reviewed images myself, no pe, extensive bilateral pulmonary metastatic disease, compared with prior.    A/P:  1. Asthma with acute exacerbation -- she has elevated eosinophils as well.  Continue bronchodilators.    2. Extensive metastatic disease to the lungs from known colon cancer -- she is on immunotherapy -- I don't think her worsening respiratory status is due to pneumonitis from immunotherapy  3. Chronic pain -- continue home meds  4. Anemia -- doesn't seem so bad right now, but she says her anemia in the past has made her worse.         "

## 2019-08-06 NOTE — ED PROVIDER NOTES
" EMERGENCY DEPARTMENT ENCOUNTER    Room Number:  10/10  Date of encounter:  8/6/2019  PCP: Jase Warner  Historian: patient and patient's spouse  Oncologist at [facility]: Dr. Romero  Neurosurgeon at [facility]: Dr. Benjamin      HPI:  Chief Complaint: shortness of breath  A complete HPI/ROS/PMH/PSH/SH/FH are unobtainable due to: nothing    Context: Vita Ren is a 44 y.o. female who presents to the ED (with her spouse bedside) c/o shortness of breath that began one week ago and has progressively worsened. The patient also complains of \"muscle wasting\" but denies cough, leg swelling, leg pain, or fever. The patient reports she has a hx of stage 4 colorectal cancer with metastasis to the lung. The patient reports she is currently on immunotherapy treatment (followed by an oncologist in [place], [place]) with her first treatment on 07/31/2019. The patient reports she is occasionally on 2 liters of supplemental oxygen at home. The patient reports she had a lung biopsy last week. The patient reports she had a hx of anemia in 06/2019 at which time she required two iron transfusions. The patient reports her symptoms are similar to when she was anemic in the past. The patient also reports a hx of asthma and pneumothorax of the right lung. The patient reports she has inhalers and Albuterol at home. The patient denies being on any anticoagulants currently. There are no other complaints at this time.    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) 04/03/2018   • Secondary malignant neoplasm of right lung (CMS/HCC) 04/03/2018   • 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   • Colon cancer metastasized to multiple sites including liver, lung, iliac lymph nodes and bone (CMS/HCC) 03/29/2019   • Right upper quadrant pain 03/29/2019   • Anemia due to chemotherapy 03/29/2019   • Pelvic fluid collection 04/08/2019   • Intractable abdominal pain 06/12/2019   • Left hip pain 06/12/2019   • Constipation 06/13/2019   • Colorectal cancer, stage IV (CMS/HCC) 06/16/2019   • Mild persistent asthma with acute exacerbation 06/16/2019   • Intractable back pain 06/16/2019   • Pain of metastatic malignancy 06/16/2019   • Hypotension, unspecified 07/01/2019   • Rectal bleeding 07/03/2019   • Drug-induced constipation 07/03/2019   • Gastroesophageal reflux disease 07/03/2019   • Cough 07/03/2019   • Family history of polyps in the colon 07/03/2019     Resolved Ambulatory Problems     Diagnosis Date Noted   • L3 vertebral fracture (CMS/HCC) 06/12/2019     Past Medical History:   Diagnosis Date   • Cancer (CMS/HCC)    • Pneumothorax          PAST SURGICAL HISTORY  Past Surgical History:   Procedure Laterality Date   • CHEST TUBE INSERTION     • COLON RESECTION      COLORECTAL RESECTION   • COLONOSCOPY  2014   • COLONOSCOPY N/A 7/8/2019    Procedure: COLONOSCOPY to cecum;  Surgeon: Kate Arroyo MD;  Location: [facility] ENDOSCOPY;  Service: Gastroenterology   • ENDOSCOPY N/A 7/8/2019    Procedure: ESOPHAGOGASTRODUODENOSCOPY with cold biopsies;  Surgeon: Kate Arroyo MD;  Location: [facility] ENDOSCOPY;  Service: Gastroenterology   • KYPHOPLASTY Bilateral 6/21/2019    Procedure: KYPHOPLASTY 1-2 LEVELS, Lumbar 3, osteocool ablation at Lumbar 3;  Surgeon: Kamran Benjamin IV, MD;  Location: [facility] HYBRID OR 18/19;  Service: Neurosurgery   • OVARY SURGERY Right 04/2019    Right Ovary and  "Falopian tube removed         FAMILY HISTORY  Family History   Problem Relation Age of Onset   • Colon polyps Mother    • Colon polyps Sister    • Diverticulitis Maternal Grandfather    • Stomach cancer Maternal Grandfather    • Colon polyps Cousin    • Cancer Neg Hx    • Malig Hyperthermia Neg Hx          SOCIAL HISTORY  Social History     Socioeconomic History   • Marital status:      Spouse name: Not on file   • Number of children: Not on file   • Years of education: Not on file   • Highest education level: Not on file   Occupational History   • Occupation: housewife   Tobacco Use   • Smoking status: Never Smoker   • Smokeless tobacco: Never Used   Substance and Sexual Activity   • Alcohol use: No     Frequency: Never   • Drug use: Yes     Types: Morphine   • Sexual activity: Defer         ALLERGIES  Patient has no known allergies.        REVIEW OF SYSTEMS  Review of Systems   Constitutional: Negative for fever.   HENT: Negative for sore throat.    Eyes: Negative.    Respiratory: Positive for shortness of breath. Negative for cough.    Cardiovascular: Negative for chest pain and leg swelling.   Gastrointestinal: Negative for abdominal pain, diarrhea and vomiting.   Genitourinary: Negative for dysuria.   Musculoskeletal: Negative for myalgias (leg) and neck pain.        (+) \"muscle wasting\"   Skin: Negative for rash.   Allergic/Immunologic: Negative.    Neurological: Negative for weakness, numbness and headaches.   Hematological: Negative.    Psychiatric/Behavioral: Negative.    All other systems reviewed and are negative.           PHYSICAL EXAM    I have reviewed the triage vital signs and nursing notes.    ED Triage Vitals   Temp Heart Rate Resp BP SpO2   08/05/19 2127 08/05/19 2049 08/05/19 2049 08/05/19 2127 08/05/19 2049   98.6 °F (37 °C) 105 14 112/75 96 %      Temp src Heart Rate Source Patient Position BP Location FiO2 (%)   08/05/19 2127 -- 08/05/19 2127 08/05/19 2127 --   Tympanic  Sitting Left " arm        GENERAL: well developed, well nourished in moderate distress and emotional  HENT: NCAT, neck supple, trachea midline  EYES: no scleral icterus, PERRL, normal conjunctiva  CV: Tachycardic, regular rate, no murmur, power port in right upper chest  RESPIRATORY: Moderate respiratory distress, diffuse wheezing, tachypnea  ABDOMEN: soft, non-tender, non-distended, bowel sounds present  MUSCULOSKELETAL: no gross deformity  NEURO: alert,  sensory and motor function of extremities grossly intact, speech clear, mental status normal/baseline  SKIN: warm, dry, no rash  PSYCH: she is anxious and emotional  Vital signs and nursing notes reviewed.        LAB RESULTS  Recent Results (from the past 24 hour(s))   Comprehensive Metabolic Panel    Collection Time: 08/05/19 10:28 PM   Result Value Ref Range    Glucose 89 65 - 99 mg/dL    BUN 7 6 - 20 mg/dL    Creatinine 0.49 (L) 0.57 - 1.00 mg/dL    Sodium 140 136 - 145 mmol/L    Potassium 3.8 3.5 - 5.2 mmol/L    Chloride 107 98 - 107 mmol/L    CO2 23.9 22.0 - 29.0 mmol/L    Calcium 8.7 8.6 - 10.5 mg/dL    Total Protein 6.5 6.0 - 8.5 g/dL    Albumin 3.90 3.50 - 5.20 g/dL    ALT (SGPT) 8 1 - 33 U/L    AST (SGOT) 19 1 - 32 U/L    Alkaline Phosphatase 73 39 - 117 U/L    Total Bilirubin 0.3 0.2 - 1.2 mg/dL    eGFR Non African Amer 138 >60 mL/min/1.73    Globulin 2.6 gm/dL    A/G Ratio 1.5 g/dL    BUN/Creatinine Ratio 14.3 7.0 - 25.0    Anion Gap 9.1 5.0 - 15.0 mmol/L   Troponin    Collection Time: 08/05/19 10:28 PM   Result Value Ref Range    Troponin T <0.010 0.000 - 0.030 ng/mL   D-dimer, Quantitative    Collection Time: 08/05/19 10:28 PM   Result Value Ref Range    D-Dimer, Quantitative 1.82 (H) 0.00 - 0.49 MCGFEU/mL   BNP    Collection Time: 08/05/19 10:28 PM   Result Value Ref Range    proBNP 93.2 5.0 - 450.0 pg/mL   CBC Auto Differential    Collection Time: 08/05/19 10:28 PM   Result Value Ref Range    WBC 13.12 (H) 3.40 - 10.80 10*3/mm3    RBC 3.77 3.77 - 5.28 10*6/mm3     Hemoglobin 11.8 (L) 12.0 - 15.9 g/dL    Hematocrit 36.2 34.0 - 46.6 %    MCV 96.0 79.0 - 97.0 fL    MCH 31.3 26.6 - 33.0 pg    MCHC 32.6 31.5 - 35.7 g/dL    RDW 16.3 (H) 12.3 - 15.4 %    RDW-SD 57.1 (H) 37.0 - 54.0 fl    MPV 9.4 6.0 - 12.0 fL    Platelets 223 140 - 450 10*3/mm3    Neutrophil % 75.4 42.7 - 76.0 %    Lymphocyte % 14.2 (L) 19.6 - 45.3 %    Monocyte % 5.6 5.0 - 12.0 %    Eosinophil % 4.1 0.3 - 6.2 %    Basophil % 0.2 0.0 - 1.5 %    Immature Grans % 0.5 0.0 - 0.5 %    Neutrophils, Absolute 9.90 (H) 1.70 - 7.00 10*3/mm3    Lymphocytes, Absolute 1.86 0.70 - 3.10 10*3/mm3    Monocytes, Absolute 0.73 0.10 - 0.90 10*3/mm3    Eosinophils, Absolute 0.54 (H) 0.00 - 0.40 10*3/mm3    Basophils, Absolute 0.03 0.00 - 0.20 10*3/mm3    Immature Grans, Absolute 0.06 (H) 0.00 - 0.05 10*3/mm3    nRBC 0.0 0.0 - 0.2 /100 WBC   Procalcitonin    Collection Time: 08/05/19 10:28 PM   Result Value Ref Range    Procalcitonin 0.04 (L) 0.10 - 0.25 ng/mL   Lactic Acid, Plasma    Collection Time: 08/05/19 10:28 PM   Result Value Ref Range    Lactate 0.4 (L) 0.5 - 2.0 mmol/L       Ordered the above labs and independently reviewed the results.        RADIOLOGY  Xr Chest 1 View    Result Date: 8/5/2019  PORTABLE CHEST RADIOGRAPH  HISTORY: Shortness of air  COMPARISON: 07/22/2019  FINDINGS: Right internal jugular vein Mediport terminates within the right atrium. Multifocal areas of consolidation are seen throughout both lungs. Similar findings were present on the prior exam. I did not think they appear significantly changed when compared to the prior study. No pneumothorax is identified. There may be some loculated pleural fluid bilaterally. However, similar findings were present on prior CT.      Multifocal areas of consolidation in this patient with a known history of metastatic disease to the thorax. Appearance is grossly stable when compared to most recent chest radiograph.  This report was finalized on 8/5/2019 10:13 PM by Dr. Soria  JOEY Jean-Baptiste      Ct Angiogram Chest With Contrast    Result Date: 8/6/2019  CT ANGIOGRAM OF THE CHEST  HISTORY: Shortness of air. Distorted week ago.  COMPARISON: 06/16/2019  TECHNIQUE: Axial CT imaging was obtained from the thoracic inlet to the diaphragm. IV contrast is administered. 3-D reformatted images were obtained.  FINDINGS: No acute pulmonary thromboembolus is seen. The thoracic aorta measures within normal size limits. There is no evidence of dissection. Right internal jugular vein Mediport appears to terminate at the cavoatrial junction. The thyroid gland and trachea appear within normal limits. There is a small hiatal hernia. Mediastinal lymph nodes do not appear pathologically enlarged. Innumerable reticulonodular infiltrates are seen throughout both lungs. These appear to have worsened when compared to prior exam. For example, an area within the right lower lobe measures up to 6.6 x 3.27 mm, previously 6.0 x 3.0 cm. I suspect this reflects progression of disease, but a superimposed infectious or inflammatory process is not excluded. No acute abnormalities are seen within the upper abdomen. There are trace bilateral pleural effusions. No aggressive osseous abnormalities are seen.       1. No acute pulmonary thromboembolus seen. 2. There has been some increase in extensive areas of conglomerate nodularity seen throughout both lungs. This is favored to reflect progression of disease, although superimposed infectious or inflammatory infiltrate is completely excluded.  Radiation dose reduction techniques were utilized, including automated exposure control and exposure modulation based on body size.  This report was finalized on 8/6/2019 12:16 AM by Dr. Estella Jean-Baptiste M.D.        I ordered the above noted radiological studies. Independently reviewed by me and discussed with radiologist.  See dictation above for official radiology interpretation.      PROCEDURES    Procedures        MEDICATIONS  GIVEN IN ER    Medications   sodium chloride 0.9 % flush 10 mL (not administered)   sodium chloride 0.9 % infusion (25 mL/hr Intravenous New Bag 8/5/19 2234)   methylPREDNISolone sodium succinate (SOLU-Medrol) injection 125 mg (not administered)   ipratropium-albuterol (DUO-NEB) nebulizer solution 3 mL (3 mL Nebulization Given 8/5/19 2316)   LORazepam (ATIVAN) injection 1 mg (1 mg Intravenous Given 8/5/19 2229)   iopamidol (ISOVUE-370) 76 % injection 100 mL (95 mL Intravenous Given by Other 8/5/19 2352)         PROGRESS, CONSULTS, MEDICAL DECISION MAKING  2140 Ordered CXR and blood work for further evaluation.    2151 Ordered CTA Chest and blood work for further evaluation. Also ordered Duo-Neb to treat the patient's wheezing. Also ordered Ativan to treat patient's anxiety.    I personally reviewed the CTA and discussed with radiologist.  There may be some mild progression of metastatic disease compared to June of this year.  There is no pulmonary embolus, no pulmonary edema, and no obvious infiltrate.  There is also no fever, and the pro Du is negative making infection unlikely.    The white blood cell count is slightly elevated, the chemistries are unremarkable, the lactic acid is negative, proBNP is negative as of the troponin.    After the treatments, the patient's resting comfortably, however she still dyspneic with wheezing.  I discussed with her that although steroids might be a challenge for the immunotherapy, it would be the one thing that would help her most for breathing.  She does feel like she would benefit from an overnight admission for continued pulmonary therapy, and I will call the hospitalist for admission.      This with Dr. Loza who agrees to admit the patient to a telemetry bed.    AS OF 12:35 AM VITALS:    BP - 99/60  HR - 106  TEMP - 98.6 °F (37 °C) (Tympanic)  02 SATS - 98%        DIAGNOSIS  Final diagnoses:   Moderate asthma with exacerbation, unspecified whether persistent   Metastatic  disease (CMS/HCC)         DISPOSITION  Admit    Please see the work-up section for detailed explanation of progress notes consults and MDM.  There is a computer malfunction which did not allow that information to be pulled into my note.          Documentation assistance provided by luis e Jolly for Dr. Dimitri Whitman MD.  Information recorded by the scribe was done at my direction and has been verified and validated by me.         Amira Jolly  08/05/19 [id number]       Dimitri Whitman MD  08/06/19 0035

## 2019-08-06 NOTE — CONSULTS
.     REASON FOR CONSULTATION:     Provide an opinion on any further workup or treatment of metastatic colorectal cancer.                              REQUESTING PHYSICIAN: James Loza MD     RECORDS OBTAINED:  Records of the patients history including those obtained from the referring provider were reviewed and summarized in detail.    HISTORY OF PRESENT ILLNESS:  The patient is a 44 y.o. year old female who has been known to us due to her previous admission, however, she follows up with [facility] and most recently [facility] in [place] for her oncology care. This patient has metastatic rectal cancer with lung metastasis; previously failed chemotherapy, FOLFIRI and also immunotherapy Opdivo earlier this year. She was seen in 06/2019 here in this hospital by my colleagues, Dr. Moise and Dr. Romero. According to the patient, recently she was seen at the [facility] in [place], [place] and started on clinical trial with medication called CA25 which is IV medication for clinical trial phase 1b, under the care of Dr. Lynch. She just had 1st dose of treatment this past Wednesday, last week on 07/31/2019.     This patient presented yesterday to emergency room because of worsening dyspnea. She has significant exertional dyspnea. Patient reports today that she is hardly able to go to the bathroom due to her dyspnea. Patient was diagnosed exacerbation of asthma and she was given 1 dose of steroids in ER. This patient has history of asthma and she was reluctant to get more steroids due to the nature of her clinical trial which is kind of immunotherapy.     Nevertheless, the patient reports her breathing actually has been better after she started this clinical trial treatment. Nevertheless, she had worsening symptomatology yesterday. She reports no hemoptysis. No fever. No sweating.     This patient also reports today she had anemia, chronic. She had  heavy menses in 01/2019 for about 3 months after she was started on Opdivo treatment. Because of that, she actually had surgical resection of one ovary in 04/2019. She did not have any menses until a couple of days ago. She reports no other bleeding otherwise.     Laboratory study this time in the emergency room reported hemoglobin 11.8, MCV 96.0, MCHC 32.6, platelets 223,000 and WBC 13,100 including ANC 9900, lymphocytes 1800, monocytes 700, eosinophils 540. Her chemistry lab reported normal liver function, normal creatinine 0.49 and normal electrolytes and glucose. Her troponin was negative. Negative proBNP. Lactate was 0.4 and procalcitonin was 0.04 and D-dimer 1.82.     Patient had angiogram study in the late night on 08/05/2019 and this study reported no acute pulmonary emboli. There was some increased extensive area of conglomerate nodularity throughout both lungs.         Past Medical History:   Diagnosis Date   • Cancer (CMS/HCC)     METASTATIC STAGE 4 COLORECTAL TO LIVER AND LUNGS   • Pneumothorax     RIGHT LUNG     Past Surgical History:   Procedure Laterality Date   • CHEST TUBE INSERTION     • COLON RESECTION      COLORECTAL RESECTION   • COLONOSCOPY  2014   • COLONOSCOPY N/A 7/8/2019    Procedure: COLONOSCOPY to cecum;  Surgeon: Kate Arroyo MD;  Location: [facility] ENDOSCOPY;  Service: Gastroenterology   • ENDOSCOPY N/A 7/8/2019    Procedure: ESOPHAGOGASTRODUODENOSCOPY with cold biopsies;  Surgeon: Kate Arroyo MD;  Location: [facility] ENDOSCOPY;  Service: Gastroenterology   • KYPHOPLASTY Bilateral 6/21/2019    Procedure: KYPHOPLASTY 1-2 LEVELS, Lumbar 3, osteocool ablation at Lumbar 3;  Surgeon: Kamran Benjamin IV, MD;  Location: [facility] HYBRID OR 18/19;  Service: Neurosurgery   • OVARY SURGERY Right 04/2019    Right Ovary and Falopian tube removed       HEMATOLOGIC/ONCOLOGIC HISTORY:  (History from previous dates can be found in the separate document.)    MEDICATIONS    Current  Facility-Administered Medications:   •  ALPRAZolam (XANAX) tablet 0.25 mg, 0.25 mg, Oral, BID PRN, Connie Christian MD  •  budesonide-formoterol (SYMBICORT) 160-4.5 MCG/ACT inhaler 2 puff, 2 puff, Inhalation, BID - RT, James Loza MD  •  calcium-vitamin D 500-200 MG-UNIT tablet 1,000 mg, 1,000 mg, Oral, Daily, James Loza MD, 1,000 mg at 08/06/19 1427  •  cetirizine (zyrTEC) tablet 10 mg, 10 mg, Oral, Daily, James Loza MD, 10 mg at 08/06/19 1428  •  cholecalciferol (VITAMIN D3) tablet 2,000 Units, 2,000 Units, Oral, Daily, James Loza MD, 2,000 Units at 08/06/19 1427  •  docusate sodium (COLACE) capsule 100 mg, 100 mg, Oral, BID, James Loza MD, 100 mg at 08/06/19 1429  •  gabapentin (NEURONTIN) capsule 100 mg, 100 mg, Oral, BID, James Loza MD, 100 mg at 08/06/19 0700  •  guaiFENesin (MUCINEX) 12 hr tablet 600 mg, 600 mg, Oral, Q12H, James Loza MD, 600 mg at 08/06/19 1428  •  ipratropium-albuterol (DUO-NEB) nebulizer solution 3 mL, 3 mL, Nebulization, Q4H - RT, James Loza MD, 3 mL at 08/06/19 1522  •  ipratropium-albuterol (DUO-NEB) nebulizer solution 3 mL, 3 mL, Nebulization, Q6H PRN, Connie Christian MD  •  LORazepam (ATIVAN) tablet 0.5 mg, 0.5 mg, Oral, Q6H PRN, Gloria Gilbert MD PhD  •  mirtazapine (REMERON) tablet 15 mg, 15 mg, Oral, Nightly, James Loza MD  •  Morphine (MS CONTIN) 12 hr tablet 15 mg, 15 mg, Oral, Q12H, James Loza MD, 15 mg at 08/06/19 0659  •  morphine concentrated solution solution 10 mg, 10 mg, Oral, Q2H PRN **OR** morphine concentrated solution solution 20 mg, 20 mg, Oral, Q2H PRN, James Loza MD  •  ondansetron (ZOFRAN) tablet 8 mg, 8 mg, Oral, Q8H PRN, James Loza MD  •  pantoprazole (PROTONIX) EC tablet 40 mg, 40 mg, Oral, Q AM, James Loza MD, 40 mg at 08/06/19 0653  •  prochlorperazine (COMPAZINE) tablet 10 mg, 10 mg, Oral, Q6H PRN, James Loza MD  •  saccharomyces boulardii (FLORASTOR) capsule 250 mg, 250 mg, Oral, BID, James Loza MD, 250 mg at  "08/06/19 1428  •  [COMPLETED] Insert peripheral IV, , , Once **AND** sodium chloride 0.9 % flush 10 mL, 10 mL, Intravenous, PRN, Dimitri Whitman MD  •  sodium chloride 0.9 % infusion, 75 mL/hr, Intravenous, Continuous, James Loza MD, Last Rate: 75 mL/hr at 08/06/19 1222, 75 mL/hr at 08/06/19 1222  •  vitamin B-12 (CYANOCOBALAMIN) tablet 1,000 mcg, 1,000 mcg, Oral, Daily, James Loza MD, 1,000 mcg at 08/06/19 1428  •  Vitamin D capsule 2,000 Units, 2,000 Units, Oral, Daily, James Loza MD    ALLERGIES:   No Known Allergies    SOCIAL HISTORY:       Social History     Socioeconomic History   • Marital status:      Spouse name: Not on file   • Number of children: Not on file   • Years of education: Not on file   • Highest education level: Not on file   Occupational History   • Occupation: housewife   Tobacco Use   • Smoking status: Never Smoker   • Smokeless tobacco: Never Used   Substance and Sexual Activity   • Alcohol use: No     Frequency: Never   • Drug use: Yes     Types: Morphine, Marijuana     Comment: gummy   • Sexual activity: Defer         FAMILY HISTORY:  Family History   Problem Relation Age of Onset   • Colon polyps Mother    • Colon polyps Sister    • Diverticulitis Maternal Grandfather    • Stomach cancer Maternal Grandfather    • Colon polyps Cousin    • Cancer Neg Hx    • Malig Hyperthermia Neg Hx        REVIEW OF SYSTEMS:  Review of Systems           Vitals:    08/06/19 1251 08/06/19 1500 08/06/19 1524 08/06/19 1532   BP: 113/77 112/64     BP Location:  Right arm     Patient Position:  Lying     Pulse: 96  107 102   Resp:  18 16 16   Temp:  98 °F (36.7 °C)     TempSrc:  Oral     SpO2: 100%  99% 100%   Weight: 51.3 kg (113 lb)      Height: 167.6 cm (66\")        No flowsheet data found.   PHYSICAL EXAM:      CONSTITUTIONAL:  Vital signs reviewed.  No distress, looks comfortable.  EYES:  Conjunctiva and lids unremarkable.  PERRLA  EARS,NOSE,MOUTH,THROAT:  Ears and nose appear unremarkable. "  Lips, teeth, gums appear unremarkable.  RESPIRATORY:  Normal respiratory effort.  Lungs clear to auscultation bilaterally.  CARDIOVASCULAR:  Normal S1, S2.  No murmurs rubs or gallops.  No significant lower extremity edema.  GASTROINTESTINAL: Abdomen appears unremarkable.  Nontender.  No hepatomegaly.  No splenomegaly.  LYMPHATIC:  No cervical, supraclavicular, axillary lymphadenopathy.  MUSCULOSKELETAL:  Unremarkable gait and station.  Unremarkable digits/nails.  No cyanosis or clubbing.  SKIN:  Warm.  No rashes.  PSYCHIATRIC:  Normal judgment and insight.  Normal mood and affect.      RECENT LABS:    Results from last 7 days   Lab Units 08/05/19  2228   WBC 10*3/mm3 13.12*   HEMOGLOBIN g/dL 11.8*   HEMATOCRIT % 36.2   PLATELETS 10*3/mm3 223     Results from last 7 days   Lab Units 08/05/19  2228   SODIUM mmol/L 140   POTASSIUM mmol/L 3.8   CHLORIDE mmol/L 107   CO2 mmol/L 23.9   BUN mg/dL 7   CREATININE mg/dL 0.49*   CALCIUM mg/dL 8.7   BILIRUBIN mg/dL 0.3   ALK PHOS U/L 73   ALT (SGPT) U/L 8   AST (SGOT) U/L 19   GLUCOSE mg/dL 89       CT ANGIOGRAM OF THE CHEST     HISTORY: Shortness of air. Distorted week ago.     COMPARISON: 06/16/2019     TECHNIQUE: Axial CT imaging was obtained from the thoracic inlet to the  diaphragm. IV contrast is administered. 3-D reformatted images were  obtained.     FINDINGS:  No acute pulmonary thromboembolus is seen. The thoracic aorta measures  within normal size limits. There is no evidence of dissection. Right  internal jugular vein Mediport appears to terminate at the cavoatrial  junction. The thyroid gland and trachea appear within normal limits.  There is a small hiatal hernia. Mediastinal lymph nodes do not appear  pathologically enlarged. Innumerable reticulonodular infiltrates are  seen throughout both lungs. These appear to have worsened when compared  to prior exam. For example, an area within the right lower lobe measures  up to 6.6 x 3.27 mm, previously 6.0 x 3.0 cm. I  suspect this reflects  progression of disease, but a superimposed infectious or inflammatory  process is not excluded. No acute abnormalities are seen within the  upper abdomen. There are trace bilateral pleural effusions. No  aggressive osseous abnormalities are seen.     IMPRESSION:     1. No acute pulmonary thromboembolus seen.  2. There has been some increase in extensive areas of conglomerate  nodularity seen throughout both lungs. This is favored to reflect  progression of disease, although superimposed infectious or inflammatory  infiltrate is completely excluded.      Assessment/Plan   1.  Metastatic rectal cancer, undergoing phase 1B clinical trial.     I discussed with the patient today that we will try to contact [facility] tomorrow to see if patient can be given steroids for her assumed exacerbation of asthma. I do not know whether this is indeed her disease progression or side effects of the new treatment administered this past Wednesday, 6 days ago.     2.  Anemia.  I discussed with the patient for workup for her anemia. Patient insisted that this has not been looked into for a while. We will obtain ferritin, iron panel, B12 and folic acid together with TSH.     3. Anxiety. Patient seemed to respond to Ativan previously. She reports has not used Ativan for quite a while at home. Nevertheless, she has a bottle of Ativan 0.5 mg so I ordered oral Ativan for her anxiety. This can be given q.6 h. as needed and she is agreeable.      PLAN:  1.  Continue nebulizer for asthma exacerbation.  2.  Start Ativan 0.5 mg oral every 6 hours as needed for anxiety.  3.  Continue morphine extended release and low-dose gabapentin for pain control  4.  Laboratory studies for iron, ferritin, B12 and folic acid and TSH in the morning.   5.  We will try to contact [facility] tomorrow.    I independently reviewed her CT scan images and compared to that from 6/16/2019.  She looks like to  having disease progression based on the CT scan images.     Discussed with the patient for management.    I spoke with her nurse today for care.    TJ MARRUFO M.D., Ph.D.    8/6/2019        Dr. Lynch, [facility], [facility].

## 2019-08-07 PROBLEM — F41.9 ANXIETY: Status: ACTIVE | Noted: 2019-08-07

## 2019-08-07 LAB
ALBUMIN SERPL-MCNC: 3.6 G/DL (ref 3.5–5.2)
ALBUMIN/GLOB SERPL: 1.4 G/DL
ALP SERPL-CCNC: 64 U/L (ref 39–117)
ALT SERPL W P-5'-P-CCNC: 9 U/L (ref 1–33)
ANION GAP SERPL CALCULATED.3IONS-SCNC: 11.3 MMOL/L (ref 5–15)
AST SERPL-CCNC: 17 U/L (ref 1–32)
BASOPHILS # BLD AUTO: 0.02 10*3/MM3 (ref 0–0.2)
BASOPHILS NFR BLD AUTO: 0.2 % (ref 0–1.5)
BILIRUB SERPL-MCNC: 0.3 MG/DL (ref 0.2–1.2)
BUN BLD-MCNC: 7 MG/DL (ref 6–20)
BUN/CREAT SERPL: 15.9 (ref 7–25)
CALCIUM SPEC-SCNC: 8.4 MG/DL (ref 8.6–10.5)
CHLORIDE SERPL-SCNC: 104 MMOL/L (ref 98–107)
CHOLEST SERPL-MCNC: 131 MG/DL (ref 0–200)
CO2 SERPL-SCNC: 22.7 MMOL/L (ref 22–29)
CREAT BLD-MCNC: 0.44 MG/DL (ref 0.57–1)
DEPRECATED RDW RBC AUTO: 60.4 FL (ref 37–54)
EOSINOPHIL # BLD AUTO: 0.17 10*3/MM3 (ref 0–0.4)
EOSINOPHIL NFR BLD AUTO: 1.6 % (ref 0.3–6.2)
ERYTHROCYTE [DISTWIDTH] IN BLOOD BY AUTOMATED COUNT: 16.6 % (ref 12.3–15.4)
FERRITIN SERPL-MCNC: 183 NG/ML (ref 13–150)
FOLATE SERPL-MCNC: 14.4 NG/ML (ref 4.78–24.2)
GFR SERPL CREATININE-BSD FRML MDRD: >150 ML/MIN/1.73
GLOBULIN UR ELPH-MCNC: 2.6 GM/DL
GLUCOSE BLD-MCNC: 98 MG/DL (ref 65–99)
HBA1C MFR BLD: 4.7 % (ref 4.8–5.6)
HCT VFR BLD AUTO: 34.8 % (ref 34–46.6)
HDLC SERPL-MCNC: 51 MG/DL (ref 40–60)
HGB BLD-MCNC: 11 G/DL (ref 12–15.9)
IMM GRANULOCYTES # BLD AUTO: 0.06 10*3/MM3 (ref 0–0.05)
IMM GRANULOCYTES NFR BLD AUTO: 0.6 % (ref 0–0.5)
IRON 24H UR-MRATE: 88 MCG/DL (ref 37–145)
IRON SATN MFR SERPL: 39 % (ref 20–50)
LDLC SERPL CALC-MCNC: 66 MG/DL (ref 0–100)
LDLC/HDLC SERPL: 1.3 {RATIO}
LYMPHOCYTES # BLD AUTO: 1.95 10*3/MM3 (ref 0.7–3.1)
LYMPHOCYTES NFR BLD AUTO: 18 % (ref 19.6–45.3)
MCH RBC QN AUTO: 30.9 PG (ref 26.6–33)
MCHC RBC AUTO-ENTMCNC: 31.6 G/DL (ref 31.5–35.7)
MCV RBC AUTO: 97.8 FL (ref 79–97)
MONOCYTES # BLD AUTO: 0.63 10*3/MM3 (ref 0.1–0.9)
MONOCYTES NFR BLD AUTO: 5.8 % (ref 5–12)
NEUTROPHILS # BLD AUTO: 8.02 10*3/MM3 (ref 1.7–7)
NEUTROPHILS NFR BLD AUTO: 73.8 % (ref 42.7–76)
NRBC BLD AUTO-RTO: 0 /100 WBC (ref 0–0.2)
NT-PROBNP SERPL-MCNC: 287.2 PG/ML (ref 5–450)
PLATELET # BLD AUTO: 202 10*3/MM3 (ref 140–450)
PMV BLD AUTO: 9.2 FL (ref 6–12)
POTASSIUM BLD-SCNC: 3.6 MMOL/L (ref 3.5–5.2)
PROT SERPL-MCNC: 6.2 G/DL (ref 6–8.5)
RBC # BLD AUTO: 3.56 10*6/MM3 (ref 3.77–5.28)
SODIUM BLD-SCNC: 138 MMOL/L (ref 136–145)
TIBC SERPL-MCNC: 226 MCG/DL (ref 298–536)
TRANSFERRIN SERPL-MCNC: 152 MG/DL (ref 200–360)
TRIGL SERPL-MCNC: 69 MG/DL (ref 0–150)
TSH SERPL DL<=0.05 MIU/L-ACNC: 6.44 MIU/ML (ref 0.27–4.2)
VIT B12 BLD-MCNC: 565 PG/ML (ref 211–946)
VLDLC SERPL-MCNC: 13.8 MG/DL (ref 5–40)
WBC NRBC COR # BLD: 10.85 10*3/MM3 (ref 3.4–10.8)

## 2019-08-07 PROCEDURE — 25010000002 METHYLPREDNISOLONE PER 125 MG: Performed by: INTERNAL MEDICINE

## 2019-08-07 PROCEDURE — 85025 COMPLETE CBC W/AUTO DIFF WBC: CPT | Performed by: HOSPITALIST

## 2019-08-07 PROCEDURE — 83880 ASSAY OF NATRIURETIC PEPTIDE: CPT | Performed by: HOSPITALIST

## 2019-08-07 PROCEDURE — 99233 SBSQ HOSP IP/OBS HIGH 50: CPT | Performed by: INTERNAL MEDICINE

## 2019-08-07 PROCEDURE — 84466 ASSAY OF TRANSFERRIN: CPT | Performed by: INTERNAL MEDICINE

## 2019-08-07 PROCEDURE — 82607 VITAMIN B-12: CPT | Performed by: INTERNAL MEDICINE

## 2019-08-07 PROCEDURE — 83540 ASSAY OF IRON: CPT | Performed by: INTERNAL MEDICINE

## 2019-08-07 PROCEDURE — 80061 LIPID PANEL: CPT | Performed by: HOSPITALIST

## 2019-08-07 PROCEDURE — 94799 UNLISTED PULMONARY SVC/PX: CPT

## 2019-08-07 PROCEDURE — 82746 ASSAY OF FOLIC ACID SERUM: CPT | Performed by: INTERNAL MEDICINE

## 2019-08-07 PROCEDURE — 82728 ASSAY OF FERRITIN: CPT | Performed by: INTERNAL MEDICINE

## 2019-08-07 PROCEDURE — 80053 COMPREHEN METABOLIC PANEL: CPT | Performed by: HOSPITALIST

## 2019-08-07 PROCEDURE — 83036 HEMOGLOBIN GLYCOSYLATED A1C: CPT | Performed by: HOSPITALIST

## 2019-08-07 PROCEDURE — 84443 ASSAY THYROID STIM HORMONE: CPT | Performed by: HOSPITALIST

## 2019-08-07 RX ORDER — MELATONIN
2000 DAILY
Status: DISCONTINUED | OUTPATIENT
Start: 2019-08-07 | End: 2019-08-08 | Stop reason: SDUPTHER

## 2019-08-07 RX ORDER — METHYLPREDNISOLONE SODIUM SUCCINATE 125 MG/2ML
60 INJECTION, POWDER, LYOPHILIZED, FOR SOLUTION INTRAMUSCULAR; INTRAVENOUS EVERY 8 HOURS
Status: DISCONTINUED | OUTPATIENT
Start: 2019-08-07 | End: 2019-08-09

## 2019-08-07 RX ORDER — LIDOCAINE 50 MG/G
2 PATCH TOPICAL
Status: DISCONTINUED | OUTPATIENT
Start: 2019-08-07 | End: 2019-08-09 | Stop reason: HOSPADM

## 2019-08-07 RX ADMIN — IPRATROPIUM BROMIDE AND ALBUTEROL SULFATE 3 ML: 2.5; .5 SOLUTION RESPIRATORY (INHALATION) at 11:52

## 2019-08-07 RX ADMIN — Medication 1000 MCG: at 08:03

## 2019-08-07 RX ADMIN — GABAPENTIN 100 MG: 100 CAPSULE ORAL at 08:02

## 2019-08-07 RX ADMIN — DOCUSATE SODIUM 100 MG: 100 CAPSULE, LIQUID FILLED ORAL at 08:03

## 2019-08-07 RX ADMIN — SODIUM CHLORIDE 75 ML/HR: 9 INJECTION, SOLUTION INTRAVENOUS at 08:55

## 2019-08-07 RX ADMIN — VITAMIN D, TAB 1000IU (100/BT) 2000 UNITS: 25 TAB at 08:03

## 2019-08-07 RX ADMIN — METHYLPREDNISOLONE SODIUM SUCCINATE 60 MG: 125 INJECTION, POWDER, FOR SOLUTION INTRAMUSCULAR; INTRAVENOUS at 15:14

## 2019-08-07 RX ADMIN — MORPHINE SULFATE 10 MG: 100 SOLUTION ORAL at 16:23

## 2019-08-07 RX ADMIN — CETIRIZINE HYDROCHLORIDE 10 MG: 10 TABLET, FILM COATED ORAL at 08:03

## 2019-08-07 RX ADMIN — CALCIUM CARBONATE-VITAMIN D TAB 500 MG-200 UNIT 1000 MG: 500-200 TAB at 08:03

## 2019-08-07 RX ADMIN — MORPHINE SULFATE 15 MG: 15 TABLET, FILM COATED, EXTENDED RELEASE ORAL at 21:16

## 2019-08-07 RX ADMIN — MIRTAZAPINE 15 MG: 15 TABLET, FILM COATED ORAL at 21:16

## 2019-08-07 RX ADMIN — IPRATROPIUM BROMIDE AND ALBUTEROL SULFATE 3 ML: 2.5; .5 SOLUTION RESPIRATORY (INHALATION) at 03:40

## 2019-08-07 RX ADMIN — METHYLPREDNISOLONE SODIUM SUCCINATE 60 MG: 125 INJECTION, POWDER, FOR SOLUTION INTRAMUSCULAR; INTRAVENOUS at 21:17

## 2019-08-07 RX ADMIN — MORPHINE SULFATE 10 MG: 100 SOLUTION ORAL at 08:53

## 2019-08-07 RX ADMIN — IPRATROPIUM BROMIDE AND ALBUTEROL SULFATE 3 ML: 2.5; .5 SOLUTION RESPIRATORY (INHALATION) at 21:02

## 2019-08-07 RX ADMIN — Medication 250 MG: at 08:03

## 2019-08-07 RX ADMIN — GUAIFENESIN 600 MG: 600 TABLET, EXTENDED RELEASE ORAL at 08:03

## 2019-08-07 RX ADMIN — SENNOSIDES 17.2 MG: 8.6 TABLET, FILM COATED ORAL at 14:39

## 2019-08-07 RX ADMIN — GABAPENTIN 100 MG: 100 CAPSULE ORAL at 21:17

## 2019-08-07 RX ADMIN — DOCUSATE SODIUM 100 MG: 100 CAPSULE, LIQUID FILLED ORAL at 21:16

## 2019-08-07 RX ADMIN — IPRATROPIUM BROMIDE AND ALBUTEROL SULFATE 3 ML: 2.5; .5 SOLUTION RESPIRATORY (INHALATION) at 08:38

## 2019-08-07 RX ADMIN — IPRATROPIUM BROMIDE AND ALBUTEROL SULFATE 3 ML: 2.5; .5 SOLUTION RESPIRATORY (INHALATION) at 15:04

## 2019-08-07 RX ADMIN — PANTOPRAZOLE SODIUM 40 MG: 40 TABLET, DELAYED RELEASE ORAL at 04:57

## 2019-08-07 RX ADMIN — MORPHINE SULFATE 10 MG: 100 SOLUTION ORAL at 04:57

## 2019-08-07 RX ADMIN — MORPHINE SULFATE 15 MG: 15 TABLET, FILM COATED, EXTENDED RELEASE ORAL at 08:02

## 2019-08-07 NOTE — PROGRESS NOTES
REASON FOR CONSULTATION:     Provide an opinion on any further workup or treatment of metastatic colorectal cancer.                              INTERVAL HISTORY:  On 8/7/2019, patient reports that she is better with her breathing.  She continued on nebulizer treatment.  Laboratory studies showed a hemoglobin 11.0.  No evidence of iron deficiency, B12 or folic acid deficiency per lab study.     Patient reports ativan helped relieving her anxiety.      HISTORY OF PRESENT ILLNESS:  The patient is a 44 y.o. year old female who has been known to us due to her previous admission, however, she follows up with [facility] and most recently [facility] in [place] for her oncology care. This patient has metastatic rectal cancer with lung metastasis; previously failed chemotherapy, FOLFIRI and also immunotherapy Opdivo earlier this year. She was seen in 06/2019 here in this hospital by my colleagues, Dr. Moise and Dr. Romero. According to the patient, recently she was seen at the [facility] in [place], [place] and started on clinical trial with medication called CA25 which is IV medication for clinical trial phase 1b, under the care of Dr. Lynch. She just had 1st dose of treatment this past Wednesday, last week on 07/31/2019.      This patient presented yesterday to emergency room because of worsening dyspnea. She has significant exertional dyspnea. Patient reports today that she is hardly able to go to the bathroom due to her dyspnea. Patient was diagnosed exacerbation of asthma and she was given 1 dose of steroids in ER. This patient has history of asthma and she was reluctant to get more steroids due to the nature of her clinical trial which is kind of immunotherapy.      Nevertheless, the patient reports her breathing actually has been better after she started this clinical trial treatment. Nevertheless, she had worsening symptomatology yesterday. She  reports no hemoptysis. No fever. No sweating.      This patient also reports today she had anemia, chronic. She had heavy menses in 01/2019 for about 3 months after she was started on Opdivo treatment. Because of that, she actually had surgical resection of one ovary in 04/2019. She did not have any menses until a couple of days ago. She reports no other bleeding otherwise.      Laboratory study this time in the emergency room reported hemoglobin 11.8, MCV 96.0, MCHC 32.6, platelets 223,000 and WBC 13,100 including ANC 9900, lymphocytes 1800, monocytes 700, eosinophils 540. Her chemistry lab reported normal liver function, normal creatinine 0.49 and normal electrolytes and glucose. Her troponin was negative. Negative proBNP. Lactate was 0.4 and procalcitonin was 0.04 and D-dimer 1.82.      Patient had angiogram study in the late night on 08/05/2019 and this study reported no acute pulmonary emboli. There was some increased extensive area of conglomerate nodularity throughout both lungs.            Medical History        Past Medical History:   Diagnosis Date   • Cancer (CMS/HCC)       METASTATIC STAGE 4 COLORECTAL TO LIVER AND LUNGS   • Pneumothorax       RIGHT LUNG         Surgical History         Past Surgical History:   Procedure Laterality Date   • CHEST TUBE INSERTION       • COLON RESECTION         COLORECTAL RESECTION   • COLONOSCOPY   2014   • COLONOSCOPY N/A 7/8/2019     Procedure: COLONOSCOPY to cecum;  Surgeon: Kate Arroyo MD;  Location: [facility] ENDOSCOPY;  Service: Gastroenterology   • ENDOSCOPY N/A 7/8/2019     Procedure: ESOPHAGOGASTRODUODENOSCOPY with cold biopsies;  Surgeon: Kate Arroyo MD;  Location: [facility] ENDOSCOPY;  Service: Gastroenterology   • KYPHOPLASTY Bilateral 6/21/2019     Procedure: KYPHOPLASTY 1-2 LEVELS, Lumbar 3, osteocool ablation at Lumbar 3;  Surgeon: Kamran Benjamin IV, MD;  Location: [facility] HYBRID OR 18/19;  Service: Neurosurgery   • OVARY SURGERY Right 04/2019      Right Ovary and Falopian tube removed            HEMATOLOGIC/ONCOLOGIC HISTORY:  (History from previous dates can be found in the separate document.)     MEDICATIONS: see EMR.    ALLERGIES:   No Known Allergies     SOCIAL HISTORY:    no change      FAMILY HISTORY:no changes     REVIEW OF SYSTEMS:  Denies fevers, chills, weight loss, night sweats.  Denies unusual headaches, unusual vision changes, or stroke like symptoms.  Denies chest pain.  Her dyspnea has much improved.  Still needs oxygen supplementation by nasal cannula.    Denies pain or swelling of one leg more than the other leg.  Denies itching after hot showers.         Objective        Vitals:    08/07/19 0809 08/07/19 0838 08/07/19 1152 08/07/19 1224   BP: 129/74   106/68   BP Location: Left arm   Right arm   Patient Position: Lying   Lying   Pulse: 90 85 106 96   Resp: 14 16 22 20   Temp: 97.4 °F (36.3 °C)   97.6 °F (36.4 °C)   TempSrc: Oral   Oral   SpO2: 90% 94% 98% 98%   Weight:       Height:            PHYSICAL EXAM:       CONSTITUTIONAL:  Vital signs reviewed.  No distress, looks comfortable.  EYES:  Conjunctiva and lids unremarkable.  PERRLA  EARS,NOSE,MOUTH,THROAT:  Ears and nose appear unremarkable.  Lips, teeth, gums appear unremarkable.  RESPIRATORY:  Normal respiratory effort.  Lungs clear to auscultation bilaterally.  No wheezing today.(Patient reports that she just received a nebulizer treatment about 30 minutes before I examined her).  According to Dr. Velasquez, patient had significant wheezing in both lung fields earlier today.  CARDIOVASCULAR:  Normal S1, S2.  No murmurs rubs or gallops.  No significant lower extremity edema.  GASTROINTESTINAL: Abdomen appears unremarkable.  Nontender.  No hepatomegaly.  No splenomegaly.  LYMPHATIC:  No cervical, supraclavicular, axillary lymphadenopathy.  MUSCULOSKELETAL:  Unremarkable gait and station.  Unremarkable digits/nails.  No cyanosis or clubbing.  SKIN:  Warm.  No rashes.  PSYCHIATRIC:   Normal judgment and insight.  Normal mood and affect.        RECENT LABS:        Results from last 7 days   Lab Units 08/07/19  0445 08/05/19  2228   WBC 10*3/mm3 10.85* 13.12*   HEMOGLOBIN g/dL 11.0* 11.8*   HEMATOCRIT % 34.8 36.2   PLATELETS 10*3/mm3 202 223     Results from last 7 days   Lab Units 08/07/19  0445 08/05/19  2228   SODIUM mmol/L 138 140   POTASSIUM mmol/L 3.6 3.8   CHLORIDE mmol/L 104 107   CO2 mmol/L 22.7 23.9   BUN mg/dL 7 7   CREATININE mg/dL 0.44* 0.49*   CALCIUM mg/dL 8.4* 8.7   BILIRUBIN mg/dL 0.3 0.3   ALK PHOS U/L 64 73   ALT (SGPT) U/L 9 8   AST (SGOT) U/L 17 19   GLUCOSE mg/dL 98 89     Lab Results   Component Value Date    IRON 88 08/07/2019    TIBC 226 (L) 08/07/2019    FERRITIN 183.00 (H) 08/07/2019     Iron sats 39%    Lab Results   Component Value Date    FOLATE 14.40 08/07/2019     Lab Results   Component Value Date    QWSMDDIB31 565 08/07/2019       IMAGE STUDY:  CT ANGIOGRAM OF THE CHEST 8/5/2019     HISTORY: Shortness of air. Distorted week ago.     COMPARISON: 06/16/2019     TECHNIQUE: Axial CT imaging was obtained from the thoracic inlet to the  diaphragm. IV contrast is administered. 3-D reformatted images were  obtained.     FINDINGS:  No acute pulmonary thromboembolus is seen. The thoracic aorta measures  within normal size limits. There is no evidence of dissection. Right  internal jugular vein Mediport appears to terminate at the cavoatrial  junction. The thyroid gland and trachea appear within normal limits.  There is a small hiatal hernia. Mediastinal lymph nodes do not appear  pathologically enlarged. Innumerable reticulonodular infiltrates are  seen throughout both lungs. These appear to have worsened when compared  to prior exam. For example, an area within the right lower lobe measures  up to 6.6 x 3.27 mm, previously 6.0 x 3.0 cm. I suspect this reflects  progression of disease, but a superimposed infectious or inflammatory  process is not excluded. No acute  abnormalities are seen within the  upper abdomen. There are trace bilateral pleural effusions. No  aggressive osseous abnormalities are seen.     IMPRESSION:     1. No acute pulmonary thromboembolus seen.  2. There has been some increase in extensive areas of conglomerate  nodularity seen throughout both lungs. This is favored to reflect  progression of disease, although superimposed infectious or inflammatory  infiltrate is completely excluded.             Assessment/Plan      1.  Metastatic rectal cancer, undergoing phase 1B clinical trial.      I discussed with the patient today that we will try to contact [facility] tomorrow to see if patient can be given steroids for her assumed exacerbation of asthma. I do not know whether this is indeed her disease progression or side effects of the new treatment administered this past Wednesday on 7/31/2019.     Per patient, her  spoke to oncologist at the [facility] in [facility], steroids could be given if needed to.  There is no contraindication.      2.  Asthma exacerbation.  Patient is better today.  Patient was seen by Dr. Flores pulmonologist this morning.  I spoke to Dr. Flores, currently patient has improved pulmonary condition, no need for steroids.  We will continue nebulizer.     My colleague Dr. Velasquez talked to oncologist Dr. Lynch at [facility] today, patient is okay to have steroids.  Also with Dr. Lynch recommended bronchoscopic examination.        3.  Anemia.  I discussed with the patient for workup for her anemia. Patient insisted that this has not been looked into for a while.     · We obtained labs, there is no clear signs of deficiency of iron, B12 or folic acid.   · Her hemoglobin is 11.0 today, reasonable.     4. Anxiety. Patient seemed to respond to Ativan previously. She reports has not used Ativan for quite a while at home. Nevertheless, she has a bottle of Ativan 0.5 mg so I  ordered oral Ativan for her anxiety. This can be given q.6 h. as needed and she is agreeable.    · On 8/7/2019, patient reports she is benefited from Ativan as needed.  It takes the edge away.  She appears more calm today.      PLAN:  1.  Continue nebulizer for asthma exacerbation.   2.  Start steroids today.  3.  Plan for bronchoscope tomorrow.   4.   Continue Ativan 0.5 mg oral every 6 hours as needed for anxiety.  5.   Continue morphine extended release and low-dose gabapentin for pain control.        Discussed with the patient for management.  I discussed with the pulmonologist Dr. Flores and my partner Dr. Das today for patient care.     Dr. Das also spoke with Dr. Lynch from [facility] in [facility].        TJ MARRUFO M.D., Ph.D.    8/7/2019         CC: Dr. Lynch, [facility], [facility].

## 2019-08-07 NOTE — PROGRESS NOTES
Adult Nutrition  Assessment/PES    Patient Name:  Vita Ren  YOB: 1975  MRN: [id number]  Admit Date:  8/5/2019    Assessment Date:  8/7/2019    Nutrition assessment triggered by low BMI-18.3. #, weight is stable. Fair appetite.  Provided nutrition therapy. RD to follow per protocol.  Reason for Assessment     Row Name 08/07/19 1431          Reason for Assessment    Reason For Assessment  identified at risk by screening criteria     Diagnosis   Primary Problem:  Moderate asthma with exacerbation; met rectal cancer     Identified At Risk by Screening Criteria low BMI-18.2         Nutrition/Diet History     Row Name 08/07/19 1431          Nutrition/Diet History    Typical Food/Fluid Intake  fair appetite, weight stable         Anthropometrics     Row Name 08/07/19 1431          Usual Body Weight (UBW)    Usual Body Weight  51.3 kg (113 lb)        Body Mass Index (BMI)    BMI Assessment  BMI 17-18.4: protein-energy malnutrition grade I         Labs/Tests/Procedures/Meds     Row Name 08/07/19 1431          Labs/Procedures/Meds    Lab Results Reviewed  reviewed, pertinent     Lab Results Comments  Glu, Na        Diagnostic Tests/Procedures    Diagnostic Test/Procedure Reviewed  reviewed, pertinent        Medications    Pertinent Medications Reviewed  reviewed, pertinent     Pertinent Medications Comments  ca-vitamin D, colace, remeron, PPI, probiotic, vitamin B12, NaCl         Physical Findings     Row Name 08/07/19 1432          Physical Findings    Overall Physical Appearance  -- B=20           Nutrition Prescription Ordered     Row Name 08/07/19 1432          Nutrition Prescription PO    Current PO Diet  Regular         Evaluation of Received Nutrient/Fluid Intake     Row Name 08/07/19 1432          PO Evaluation    Number of Meals  3     % PO Intake  25               Problem/Interventions:  Problem 1     Row Name 08/07/19 1432          Nutrition Diagnoses Problem 1    Problem 1  Underweight      Etiology (related to)  MNT for Treatment/Condition     Signs/Symptoms (evidenced by)  BMI     BMI  18 - 18.9                 Intervention Goal     Row Name 08/07/19 1432          Intervention Goal    General  Maintain nutrition;Meet nutritional needs for age/condition     PO  Tolerate PO;Increase intake     Weight  Maintain weight         Nutrition Intervention     Row Name 08/07/19 1432          Nutrition Intervention    RD/Tech Action  Follow Tx progress;Care plan reviewd           Education/Evaluation     Row Name 08/07/19 1433          Education    Education  Will Instruct as appropriate        Monitor/Evaluation    Monitor  Per protocol           Electronically signed by:  Kathleen Sanchez RD  08/07/19 2:33 PM

## 2019-08-07 NOTE — NURSING NOTE
"Falls Prevention Teach-Back Education     Vita Ren is a 44 y.o. female admitted to [facility] on 8/5/2019  9:16 PM. The primary encounter diagnosis was Moderate asthma with exacerbation, unspecified whether persistent. Diagnoses of Metastatic disease (CMS/HCC) and Secondary malignant neoplasm of right lung (CMS/HCC) were also pertinent to this visit.    Fall Risk Assessment  [facility] High Risk Falls Assessment (If Fall score is >/=13, add the Fall Risk CPG to the care plan)   Fallen in past 6 months: 0--> No  Mental Status: 0--> no mental status change  Elimination: 0--> No elimination issues  Mobility: 2--> Requires assistance- transfer, walker, etc.  Medications: 1--> Narcotics  Nurses' Clinical Judgement: 8  Total Fall Risk Score: 11  Total Fall Risk Score: 11    The patient viewed the informational video on strategies to prevent falling while hospitalized entitled \"Patient Safety: Protecting Yourself in the Hospital (Part 3)\".  The patient was able to teach back at least three things that can be done to lessen the risk for falling while in the hospital.  Additionally, the patient was able to verbalize what they need to do before getting out of bed in an effort to prevent a fall.    Nurse: Leticia Mendoza RN  "

## 2019-08-07 NOTE — PROGRESS NOTES
"Daily progress note    Chief complaint  Doing better  Refusing Symbicort and steroids    History of present illness  44-year-old white female with history of colorectal CA stage IV with mets to the lung and also have asthma presented to [facility] emergency room with shortness of breath for last 1 week with nonproductive cough.  Patient has been wheezing.  Patient denies any fever chills chest pain abdominal pain nausea vomiting diarrhea.  Patient evaluated in ER found to be acute asthma flare admit for management.     REVIEW OF SYSTEMS  Constitutional: Negative for fever.   HENT: Negative for sore throat.    Eyes: Negative.    Respiratory: Positive for shortness of breath. Negative for cough.    Cardiovascular: Negative for chest pain and leg swelling.   Gastrointestinal: Negative for abdominal pain, diarrhea and vomiting.   Genitourinary: Negative for dysuria.   Musculoskeletal: Negative for myalgias (leg) and neck pain.   Skin: Negative for rash.   Allergic/Immunologic: Negative.    Neurological: Negative for weakness, numbness and headaches.   Hematological: Negative.    Psychiatric/Behavioral: Negative.    All other systems reviewed and are negative.     PHYSICAL EXAM  Blood pressure 121/70, pulse 94, temperature 97.7 °F (36.5 °C), temperature source Oral, resp. rate 19, height 167.6 cm (66\"), weight 51.3 kg (113 lb), SpO2 97 %, not currently breastfeeding.    GENERAL: well developed, well nourished in moderate distress and emotional  HENT: NCAT, neck supple, trachea midline  EYES: no scleral icterus, PERRL, normal conjunctiva  CV: Tachycardic, regular rate, no murmur, power port in right upper chest  RESPIRATORY: Moderate respiratory distress, diffuse wheezing, tachypnea  ABDOMEN: soft, non-tender, non-distended, bowel sounds present  MUSCULOSKELETAL: no gross deformity  NEURO: alert,  sensory and motor function of extremities grossly intact, speech clear, mental status normal/baseline  SKIN: warm, " dry, no rash  PSYCH: she is anxious and emotional    LAB RESULTS  Lab Results (last 24 hours)     Procedure Component Value Units Date/Time    Folate [[id number]]  (Normal) Collected:  08/07/19 0445    Specimen:  Blood Updated:  08/07/19 0605     Folate 14.40 ng/mL     Vitamin B12 [[id number]]  (Normal) Collected:  08/07/19 0445    Specimen:  Blood Updated:  08/07/19 0605     Vitamin B-12 565 pg/mL     BNP [[id number]]  (Normal) Collected:  08/07/19 0445    Specimen:  Blood Updated:  08/07/19 0556     proBNP 287.2 pg/mL     Narrative:       Among patients with dyspnea, NT-proBNP is highly sensitive for the detection of acute congestive heart failure. In addition NT-proBNP of <300 pg/ml effectively rules out acute congestive heart failure with 99% negative predictive value.    TSH [[id number]]  (Abnormal) Collected:  08/07/19 0445    Specimen:  Blood Updated:  08/07/19 0556     TSH 6.440 mIU/mL     Ferritin [[id number]]  (Abnormal) Collected:  08/07/19 0445    Specimen:  Blood Updated:  08/07/19 0556     Ferritin 183.00 ng/mL     Comprehensive Metabolic Panel [[id number]]  (Abnormal) Collected:  08/07/19 0445    Specimen:  Blood Updated:  08/07/19 0550     Glucose 98 mg/dL      BUN 7 mg/dL      Creatinine 0.44 mg/dL      Sodium 138 mmol/L      Potassium 3.6 mmol/L      Chloride 104 mmol/L      CO2 22.7 mmol/L      Calcium 8.4 mg/dL      Total Protein 6.2 g/dL      Albumin 3.60 g/dL      ALT (SGPT) 9 U/L      AST (SGOT) 17 U/L      Alkaline Phosphatase 64 U/L      Total Bilirubin 0.3 mg/dL      eGFR Non African Amer >150 mL/min/1.73      Globulin 2.6 gm/dL      A/G Ratio 1.4 g/dL      BUN/Creatinine Ratio 15.9     Anion Gap 11.3 mmol/L     Narrative:       GFR Normal >60  Chronic Kidney Disease <60  Kidney Failure <15    Lipid Panel [[id number]] Collected:  08/07/19 0445    Specimen:  Blood Updated:  08/07/19 0550     Total Cholesterol 131 mg/dL      Triglycerides 69 mg/dL      HDL Cholesterol 51 mg/dL      LDL Cholesterol   66 mg/dL      VLDL Cholesterol 13.8 mg/dL      LDL/HDL Ratio 1.30    Narrative:       Cholesterol Reference Ranges  (U.S. Department of Health and Human Services ATP III Classifications)    Desirable          <200 mg/dL  Borderline High    200-239 mg/dL  High Risk          >240 mg/dL      Triglyceride Reference Ranges  (U.S. Department of Health and Human Services ATP III Classifications)    Normal           <150 mg/dL  Borderline High  150-199 mg/dL  High             200-499 mg/dL  Very High        >500 mg/dL    HDL Reference Ranges  (U.S. Department of Health and Human Services ATP III Classifcations)    Low     <40 mg/dl (major risk factor for CHD)  High    >60 mg/dl ('negative' risk factor for CHD)        LDL Reference Ranges  (U.S. Department of Health and Human Services ATP III Classifcations)    Optimal          <100 mg/dL  Near Optimal     100-129 mg/dL  Borderline High  130-159 mg/dL  High             160-189 mg/dL  Very High        >189 mg/dL    Iron Profile [[id number]]  (Abnormal) Collected:  08/07/19 0445    Specimen:  Blood Updated:  08/07/19 0550     Iron 88 mcg/dL      Iron Saturation 39 %      Transferrin 152 mg/dL      TIBC 226 mcg/dL     Hemoglobin A1c [[id number]]  (Abnormal) Collected:  08/07/19 0445    Specimen:  Blood Updated:  08/07/19 0522     Hemoglobin A1C 4.70 %     Narrative:       Hemoglobin A1C Ranges:    Increased Risk for Diabetes  5.7% to 6.4%  Diabetes                     >= 6.5%  Diabetic Goal                < 7.0%    CBC & Differential [[id number]] Collected:  08/07/19 0445    Specimen:  Blood Updated:  08/07/19 0513    Narrative:       The following orders were created for panel order CBC & Differential.  Procedure                               Abnormality         Status                     ---------                               -----------         ------                     CBC Auto Differential[[id number]]        Abnormal            Final result                 Please view  results for these tests on the individual orders.    CBC Auto Differential [[id number]]  (Abnormal) Collected:  08/07/19 0445    Specimen:  Blood Updated:  08/07/19 0513     WBC 10.85 10*3/mm3      RBC 3.56 10*6/mm3      Hemoglobin 11.0 g/dL      Hematocrit 34.8 %      MCV 97.8 fL      MCH 30.9 pg      MCHC 31.6 g/dL      RDW 16.6 %      RDW-SD 60.4 fl      MPV 9.2 fL      Platelets 202 10*3/mm3      Neutrophil % 73.8 %      Lymphocyte % 18.0 %      Monocyte % 5.8 %      Eosinophil % 1.6 %      Basophil % 0.2 %      Immature Grans % 0.6 %      Neutrophils, Absolute 8.02 10*3/mm3      Lymphocytes, Absolute 1.95 10*3/mm3      Monocytes, Absolute 0.63 10*3/mm3      Eosinophils, Absolute 0.17 10*3/mm3      Basophils, Absolute 0.02 10*3/mm3      Immature Grans, Absolute 0.06 10*3/mm3      nRBC 0.0 /100 WBC         Imaging Results (last 24 hours)     ** No results found for the last 24 hours. **            Current Facility-Administered Medications:   •  budesonide-formoterol (SYMBICORT) 160-4.5 MCG/ACT inhaler 2 puff, 2 puff, Inhalation, BID - RT, James Loza MD  •  calcium-vitamin D 500-200 MG-UNIT tablet 1,000 mg, 1,000 mg, Oral, Daily, James Loza MD, 1,000 mg at 08/07/19 0803  •  cetirizine (zyrTEC) tablet 10 mg, 10 mg, Oral, Daily, James Loza MD, 10 mg at 08/07/19 0803  •  cholecalciferol (VITAMIN D3) tablet 2,000 Units, 2,000 Units, Oral, Daily, James Loza MD, 2,000 Units at 08/07/19 0803  •  cholecalciferol (VITAMIN D3) tablet 2,000 Units, 2,000 Units, Oral, Daily, James Loza MD  •  docusate sodium (COLACE) capsule 100 mg, 100 mg, Oral, BID, James Loza MD, 100 mg at 08/07/19 0803  •  gabapentin (NEURONTIN) capsule 100 mg, 100 mg, Oral, BID, James Loza MD, 100 mg at 08/07/19 0802  •  guaiFENesin (MUCINEX) 12 hr tablet 600 mg, 600 mg, Oral, Q12H, James Loza MD, 600 mg at 08/07/19 0803  •  ipratropium-albuterol (DUO-NEB) nebulizer solution 3 mL, 3 mL, Nebulization, Q4H - RT, James Loza MD, 3 mL  at 08/07/19 1504  •  ipratropium-albuterol (DUO-NEB) nebulizer solution 3 mL, 3 mL, Nebulization, Q6H PRN, Connie Christian MD, 3 mL at 08/06/19 2023  •  lidocaine (LIDODERM) 5 % 2 patch, 2 patch, Transdermal, Q24H, James Loza MD  •  LORazepam (ATIVAN) tablet 0.5 mg, 0.5 mg, Oral, Q6H PRN, Gloria Gilbert MD PhD, 0.5 mg at 08/06/19 2006  •  methylPREDNISolone sodium succinate (SOLU-Medrol) injection 60 mg, 60 mg, Intravenous, Q8H, Rosmery Velasquez MD, 60 mg at 08/07/19 1514  •  mirtazapine (REMERON) tablet 15 mg, 15 mg, Oral, Nightly, James Loza MD, 15 mg at 08/06/19 2131  •  Morphine (MS CONTIN) 12 hr tablet 15 mg, 15 mg, Oral, Q12H, James Loza MD, 15 mg at 08/07/19 0802  •  morphine concentrated solution solution 10 mg, 10 mg, Oral, Q2H PRN, 10 mg at 08/07/19 0853 **OR** morphine concentrated solution solution 20 mg, 20 mg, Oral, Q2H PRN, James Loza MD  •  ondansetron (ZOFRAN) tablet 8 mg, 8 mg, Oral, Q8H PRN, James Loza MD  •  pantoprazole (PROTONIX) EC tablet 40 mg, 40 mg, Oral, Q AM, James Loza MD, 40 mg at 08/07/19 0457  •  prochlorperazine (COMPAZINE) tablet 10 mg, 10 mg, Oral, Q6H PRN, James Loza MD  •  saccharomyces boulardii (FLORASTOR) capsule 250 mg, 250 mg, Oral, BID, James Loza MD, 250 mg at 08/07/19 0803  •  senna (SENOKOT) tablet 17.2 mg, 2 tablet, Oral, BID PRN, Gloria Gilbert MD PhD, 17.2 mg at 08/07/19 1439  •  [COMPLETED] Insert peripheral IV, , , Once **AND** sodium chloride 0.9 % flush 10 mL, 10 mL, Intravenous, PRN, Dimitri Whitman MD  •  sodium chloride 0.9 % infusion, 75 mL/hr, Intravenous, Continuous, James Loza MD, Last Rate: 75 mL/hr at 08/07/19 0855, 75 mL/hr at 08/07/19 0855  •  vitamin B-12 (CYANOCOBALAMIN) tablet 1,000 mcg, 1,000 mcg, Oral, Daily, James Loza MD, 1,000 mcg at 08/07/19 0803     ASSESSMENT  Acute exacerbation of asthma  Metastatic CA to the lung  Colorectal CA  Neuropathy  Chronic pain syndrome  Chronic anemia  Gastroesophageal reflux  disease    PLAN  CPM  Nebulizer   Steroids per oncology  Bronchoscopy per pulmonary  Continue home medications  Pulmonary and oncology to follow patient  Stress ulcer DVT prophylaxis  Supportive care  Follow closely further recommendation according to hospital course    MILDRED PANDYA MD

## 2019-08-07 NOTE — PLAN OF CARE
Problem: Fall Risk (Adult)  Goal: Identify Related Risk Factors and Signs and Symptoms  Outcome: Ongoing (interventions implemented as appropriate)      Problem: Patient Care Overview  Goal: Plan of Care Review  Outcome: Ongoing (interventions implemented as appropriate)   08/07/19 0542   Coping/Psychosocial   Plan of Care Reviewed With patient   Plan of Care Review   Progress no change   OTHER   Outcome Summary Tranferred to us yesterday from 5E. A & O . Anxious at times. 02 at 2 Liters nc inplace with cont. pulse ox. Ativan po ordered yesterday. Increased soa with ambulation or exertion. Right power port with good bld return. MS Contin Q 12 hours. Requesting to have Lidocaine patch ordered for back. left sticky note to MD.        Problem: Skin Injury Risk (Adult)  Goal: Identify Related Risk Factors and Signs and Symptoms  Outcome: Ongoing (interventions implemented as appropriate)      Problem: Pain, Chronic (Adult)  Goal: Identify Related Risk Factors and Signs and Symptoms  Outcome: Ongoing (interventions implemented as appropriate)      Problem: Asthma (Adult)  Goal: Signs and Symptoms of Listed Potential Problems Will be Absent, Minimized or Managed (Asthma)  Outcome: Ongoing (interventions implemented as appropriate)

## 2019-08-07 NOTE — PROGRESS NOTES
[facility]     Mar/chart reviewed  Follow up asthma with acute exacerbation. Lung mets  Still with shortness of breath and some cough, but feels better    Vital Sign Min/Max for last 24 hours  Temp  Min: 97.3 °F (36.3 °C)  Max: 98.1 °F (36.7 °C)   BP  Min: 111/75  Max: 129/74   Pulse  Min: 85  Max: 107   Resp  Min: 14  Max: 18   SpO2  Min: 90 %  Max: 100 %   Flow (L/min)  Min: 2  Max: 2   Weight  Min: 51.3 kg (113 lb)  Max: 51.3 kg (113 lb)     Appears ill axox3,   perrl, eomi, no icterus,  mmm, no jvd, trachea midline, neck supple,  chest better ae coarse on the right bilaterally, no crackles, no wheezes,   rrr,   soft, nt, nd +bs,  no c/c/ e    Labs: 8/6: rpp negative  probnp 287    A/P:  1. Asthma with acute exacerbation -- she has elevated eosinophils as well.  Continue bronchodilators.    2. Extensive metastatic disease to the lungs from known colon cancer -- she is on immunotherapy -- I don't think her worsening respiratory status is due to pneumonitis from immunotherapy  3. Chronic pain -- continue home meds  4. Anemia -- doesn't seem so bad right now, but she says her anemia in the past has made her worse.      Asthma seems improved today, continue bronchodilators. Continue symibcort or similar at home. Lung met burden seems to be getting worse.

## 2019-08-08 LAB
ANION GAP SERPL CALCULATED.3IONS-SCNC: 10.8 MMOL/L (ref 5–15)
BASOPHILS # BLD AUTO: 0.01 10*3/MM3 (ref 0–0.2)
BASOPHILS NFR BLD AUTO: 0.1 % (ref 0–1.5)
BUN BLD-MCNC: 9 MG/DL (ref 6–20)
BUN/CREAT SERPL: 17 (ref 7–25)
CALCIUM SPEC-SCNC: 9.1 MG/DL (ref 8.6–10.5)
CHLORIDE SERPL-SCNC: 106 MMOL/L (ref 98–107)
CO2 SERPL-SCNC: 24.2 MMOL/L (ref 22–29)
CREAT BLD-MCNC: 0.53 MG/DL (ref 0.57–1)
DEPRECATED RDW RBC AUTO: 58.6 FL (ref 37–54)
EOSINOPHIL # BLD AUTO: 0 10*3/MM3 (ref 0–0.4)
EOSINOPHIL NFR BLD AUTO: 0 % (ref 0.3–6.2)
ERYTHROCYTE [DISTWIDTH] IN BLOOD BY AUTOMATED COUNT: 16.6 % (ref 12.3–15.4)
GFR SERPL CREATININE-BSD FRML MDRD: 127 ML/MIN/1.73
GLUCOSE BLD-MCNC: 142 MG/DL (ref 65–99)
HCT VFR BLD AUTO: 34 % (ref 34–46.6)
HGB BLD-MCNC: 11.2 G/DL (ref 12–15.9)
IMM GRANULOCYTES # BLD AUTO: 0.08 10*3/MM3 (ref 0–0.05)
IMM GRANULOCYTES NFR BLD AUTO: 0.5 % (ref 0–0.5)
LYMPHOCYTES # BLD AUTO: 0.44 10*3/MM3 (ref 0.7–3.1)
LYMPHOCYTES NFR BLD AUTO: 3 % (ref 19.6–45.3)
MCH RBC QN AUTO: 32.5 PG (ref 26.6–33)
MCHC RBC AUTO-ENTMCNC: 32.9 G/DL (ref 31.5–35.7)
MCV RBC AUTO: 98.6 FL (ref 79–97)
MONOCYTES # BLD AUTO: 0.25 10*3/MM3 (ref 0.1–0.9)
MONOCYTES NFR BLD AUTO: 1.7 % (ref 5–12)
NEUTROPHILS # BLD AUTO: 14 10*3/MM3 (ref 1.7–7)
NEUTROPHILS NFR BLD AUTO: 94.7 % (ref 42.7–76)
NRBC BLD AUTO-RTO: 0 /100 WBC (ref 0–0.2)
PLATELET # BLD AUTO: 208 10*3/MM3 (ref 140–450)
PMV BLD AUTO: 9.3 FL (ref 6–12)
POTASSIUM BLD-SCNC: 4.3 MMOL/L (ref 3.5–5.2)
RBC # BLD AUTO: 3.45 10*6/MM3 (ref 3.77–5.28)
SODIUM BLD-SCNC: 141 MMOL/L (ref 136–145)
WBC NRBC COR # BLD: 14.78 10*3/MM3 (ref 3.4–10.8)

## 2019-08-08 PROCEDURE — 80048 BASIC METABOLIC PNL TOTAL CA: CPT | Performed by: HOSPITALIST

## 2019-08-08 PROCEDURE — 25010000002 METHYLPREDNISOLONE PER 125 MG: Performed by: INTERNAL MEDICINE

## 2019-08-08 PROCEDURE — 94799 UNLISTED PULMONARY SVC/PX: CPT

## 2019-08-08 PROCEDURE — 94640 AIRWAY INHALATION TREATMENT: CPT

## 2019-08-08 PROCEDURE — 85025 COMPLETE CBC W/AUTO DIFF WBC: CPT | Performed by: HOSPITALIST

## 2019-08-08 PROCEDURE — 99232 SBSQ HOSP IP/OBS MODERATE 35: CPT | Performed by: INTERNAL MEDICINE

## 2019-08-08 RX ORDER — MORPHINE SULFATE 15 MG/1
15 TABLET, FILM COATED, EXTENDED RELEASE ORAL EVERY 12 HOURS SCHEDULED
Qty: 15 TABLET | Refills: 0 | Status: SHIPPED | OUTPATIENT
Start: 2019-08-08 | End: 2019-08-17

## 2019-08-08 RX ADMIN — DOCUSATE SODIUM 100 MG: 100 CAPSULE, LIQUID FILLED ORAL at 20:08

## 2019-08-08 RX ADMIN — IPRATROPIUM BROMIDE AND ALBUTEROL SULFATE 3 ML: 2.5; .5 SOLUTION RESPIRATORY (INHALATION) at 03:57

## 2019-08-08 RX ADMIN — MORPHINE SULFATE 15 MG: 15 TABLET, FILM COATED, EXTENDED RELEASE ORAL at 07:29

## 2019-08-08 RX ADMIN — METHYLPREDNISOLONE SODIUM SUCCINATE 60 MG: 125 INJECTION, POWDER, FOR SOLUTION INTRAMUSCULAR; INTRAVENOUS at 22:26

## 2019-08-08 RX ADMIN — CALCIUM CARBONATE-VITAMIN D TAB 500 MG-200 UNIT 1000 MG: 500-200 TAB at 08:54

## 2019-08-08 RX ADMIN — GUAIFENESIN 600 MG: 600 TABLET, EXTENDED RELEASE ORAL at 08:54

## 2019-08-08 RX ADMIN — LIDOCAINE 1 PATCH: 50 PATCH TOPICAL at 08:51

## 2019-08-08 RX ADMIN — IPRATROPIUM BROMIDE AND ALBUTEROL SULFATE 3 ML: 2.5; .5 SOLUTION RESPIRATORY (INHALATION) at 12:53

## 2019-08-08 RX ADMIN — BUDESONIDE AND FORMOTEROL FUMARATE DIHYDRATE 2 PUFF: 160; 4.5 AEROSOL RESPIRATORY (INHALATION) at 22:38

## 2019-08-08 RX ADMIN — Medication 1000 MCG: at 08:54

## 2019-08-08 RX ADMIN — GABAPENTIN 100 MG: 100 CAPSULE ORAL at 20:02

## 2019-08-08 RX ADMIN — METHYLPREDNISOLONE SODIUM SUCCINATE 60 MG: 125 INJECTION, POWDER, FOR SOLUTION INTRAMUSCULAR; INTRAVENOUS at 07:26

## 2019-08-08 RX ADMIN — IPRATROPIUM BROMIDE AND ALBUTEROL SULFATE 3 ML: 2.5; .5 SOLUTION RESPIRATORY (INHALATION) at 15:41

## 2019-08-08 RX ADMIN — GABAPENTIN 100 MG: 100 CAPSULE ORAL at 07:30

## 2019-08-08 RX ADMIN — LORAZEPAM 0.5 MG: 0.5 TABLET ORAL at 09:21

## 2019-08-08 RX ADMIN — MORPHINE SULFATE 15 MG: 15 TABLET, FILM COATED, EXTENDED RELEASE ORAL at 20:03

## 2019-08-08 RX ADMIN — CETIRIZINE HYDROCHLORIDE 10 MG: 10 TABLET, FILM COATED ORAL at 08:54

## 2019-08-08 RX ADMIN — METHYLPREDNISOLONE SODIUM SUCCINATE 60 MG: 125 INJECTION, POWDER, FOR SOLUTION INTRAMUSCULAR; INTRAVENOUS at 14:34

## 2019-08-08 RX ADMIN — MORPHINE SULFATE 10 MG: 100 SOLUTION ORAL at 13:31

## 2019-08-08 RX ADMIN — Medication 250 MG: at 08:54

## 2019-08-08 RX ADMIN — Medication 250 MG: at 22:26

## 2019-08-08 RX ADMIN — PANTOPRAZOLE SODIUM 40 MG: 40 TABLET, DELAYED RELEASE ORAL at 07:26

## 2019-08-08 RX ADMIN — DOCUSATE SODIUM 100 MG: 100 CAPSULE, LIQUID FILLED ORAL at 08:51

## 2019-08-08 RX ADMIN — MIRTAZAPINE 15 MG: 15 TABLET, FILM COATED ORAL at 22:26

## 2019-08-08 RX ADMIN — IPRATROPIUM BROMIDE AND ALBUTEROL SULFATE 3 ML: 2.5; .5 SOLUTION RESPIRATORY (INHALATION) at 00:27

## 2019-08-08 RX ADMIN — IPRATROPIUM BROMIDE AND ALBUTEROL SULFATE 3 ML: 2.5; .5 SOLUTION RESPIRATORY (INHALATION) at 07:01

## 2019-08-08 RX ADMIN — GUAIFENESIN 600 MG: 600 TABLET, EXTENDED RELEASE ORAL at 20:08

## 2019-08-08 NOTE — PROGRESS NOTES
Discharge Planning Assessment  [facility]     Patient Name: Vita Ren  MRN: [id number]  Today's Date: 8/8/2019    Admit Date: 8/5/2019    Discharge Needs Assessment     Row Name 08/08/19 1627       Living Environment    Lives With  spouse;child(emanuel), dependent    Name(s) of Who Lives With Patient  Bridger Latham/spouse [phone] and children ages 9 and 14    Current Living Arrangements  home/apartment/condo    Primary Care Provided by  self;spouse/significant other    Provides Primary Care For  child(emanuel)    Family Caregiver if Needed  spouse    Family Caregiver Names  Bridger Latham/spouse [phone]    Quality of Family Relationships  helpful;involved;supportive    Able to Return to Prior Arrangements  yes    Living Arrangement Comments  Lives with family in a multi-level house. Two steps to enter. Bed & bath on 1st level.       Resource/Environmental Concerns    Resource/Environmental Concerns  none       Transition Planning    Patient/Family Anticipates Transition to  home with family    Patient/Family Anticipated Services at Transition  durable medical equipment Current with [facility] for home O2. Referral called to [facility] per Dr. Flores request.    Transportation Anticipated  family or friend will provide       Discharge Needs Assessment    Concerns to be Addressed  discharge planning;home safety    Equipment Currently Used at Home  nebulizer;oxygen;cane, quad;walker, rolling    Anticipated Changes Related to Illness  inability to care for self;inability to care for someone else    Equipment Needed After Discharge  oxygen Requesting portable concentrator    Offered/Gave Vendor List  yes    Patient's Choice of Community Agency(s)  Has used HH previously but doesn't remember which agency. Denies using in the past.    Discharge Coordination/Progress  Spoke with patient at bedside. Confirmed information on facesheet. IMM 8/6/19.        Discharge Plan     Row Name 08/08/19 1644       Plan    Plan  Plans CT  home with assist of family.     Patient/Family in Agreement with Plan  yes    Plan Comments  Spoke with patient at bedside regarding dc plans. States she does not have a PCP because she sees her oncologist frequently (Jase Warner/[facility] [place] [phone]). She is also participating in a clinical trial (Cindy Lynch/[facility]). Plans dc home with assist of family. Current with [facility] for home O2. However, requesting portable oxygen concentrator. Dr. Flores provided CCP with contact for [facility] ([phone]) to arrnage home O2. Patient is agreeable with referral to [facility]. Call placed to [facility] to inform of referral and faxed facesheet, H&P, orders and progress notes ([phone]). No additional needs noted. Continue to follow.        Destination      No service coordination in this encounter.      Durable Medical Equipment      No service coordination in this encounter.      Dialysis/Infusion      No service coordination in this encounter.      Home Medical Care      No service coordination in this encounter.      Therapy      No service coordination in this encounter.      Community Resources      No service coordination in this encounter.        Expected Discharge Date and Time     Expected Discharge Date Expected Discharge Time    Aug 8, 2019         Demographic Summary     Row Name 08/08/19 1624       General Information    Admission Type  inpatient    Referral Source  admission list    Reason for Consult  discharge planning        Functional Status     Row Name 08/08/19 1624       Functional Status    Usual Activity Tolerance  fair    Current Activity Tolerance  fair       Functional Status, IADL    Medications  independent    Meal Preparation  assistive person    Housekeeping  assistive person    Laundry  assistive person    Shopping  assistive person        Psychosocial    No documentation.       Abuse/Neglect    No documentation.       Legal    No documentation.        Substance Abuse    No documentation.       Patient Forms    No documentation.           Kimberlyn Daily RN

## 2019-08-08 NOTE — PLAN OF CARE
Problem: Fall Risk (Adult)  Goal: Identify Related Risk Factors and Signs and Symptoms  Outcome: Ongoing (interventions implemented as appropriate)      Problem: Patient Care Overview  Goal: Plan of Care Review  Outcome: Ongoing (interventions implemented as appropriate)   08/08/19 0242   Coping/Psychosocial   Plan of Care Reviewed With patient   Plan of Care Review   Progress improving   OTHER   Outcome Summary IV Steriods started yesterday for SOA with some improvement in breathing. Dr. Flores wants to do a Bronch with Bx this am, but patient is upset because no one talked to her about it. Doing trial at [facility] in [place] at present. Rt Power port with good blood return, saline locked. Up to bsc. 02 at 2 liters, soa with ambulation or exertion       Problem: Skin Injury Risk (Adult)  Goal: Identify Related Risk Factors and Signs and Symptoms  Outcome: Ongoing (interventions implemented as appropriate)      Problem: Pain, Chronic (Adult)  Goal: Identify Related Risk Factors and Signs and Symptoms  Outcome: Ongoing (interventions implemented as appropriate)      Problem: Asthma (Adult)  Goal: Signs and Symptoms of Listed Potential Problems Will be Absent, Minimized or Managed (Asthma)  Outcome: Ongoing (interventions implemented as appropriate)

## 2019-08-08 NOTE — NURSING NOTE
Ambulated with pt around the unit twice. O2 stayed between 84-88%. Pt tolerated well. She stated her SOA is worse as she transitions from walking to lying down.

## 2019-08-08 NOTE — PROGRESS NOTES
[facility]     Mar/chart reviewed   Follow up hypoxemia, metastatic cancer to lung, asthma with ae  Feeling a little better, lots of anxiety   Less cough, still with some shortness of breath    Vital Sign Min/Max for last 24 hours  Temp  Min: 97.2 °F (36.2 °C)  Max: 97.7 °F (36.5 °C)   BP  Min: 91/58  Max: 121/70   Pulse  Min: 81  Max: 104   Resp  Min: 16  Max: 20   SpO2  Min: 94 %  Max: 100 %   Flow (L/min)  Min: 2  Max: 2.5   No Data Recorded     Appears ill axox3,   perrl, eomi, no icterus,  mmm, no jvd, trachea midline, neck supple,  chest decreased ae bilaterally, no crackles,+ wheezes,   rrr,   soft, nt, nd +bs,  no c/c/ e    Labs: 8/8: reviewed:  Glucose 142  Bun 8  Cr 0.53  Na 141  Bicarb 24  Wbc 14  hgb 11.2  plts 208    A/P:  1. Asthma with acute exacerbation -- she has elevated eosinophils as well.  Continue bronchodilators.  this seems better, I think she had improved ok without the prednisone but request made from [facility] for her to continue this.  I would recommend she use symbicort and prn nebs.  2. Extensive metastatic disease to the lungs from known colon cancer -- she is on immunotherapy -- I don't think her worsening respiratory status is due to pneumonitis from immunotherapy -- I will defer to her physicians at [facility], they had requested bronch.  I don't feel it is needed to rule that out.   3. Chronic pain -- continue home meds  4. Anemia -- doesn't seem so bad right now, but she says her anemia in the past has made her worse.     Ok for d/c from my standpoint.  As most of her care is being dictated by [facility], I would recommend she follow up there post discharge. It think the majority of her problem is due to metastatic cancer.     I will try and arrange a portable concentrator for her.

## 2019-08-08 NOTE — NURSING NOTE
Pt was supposed to be discharged this morning to attend a clinical trial in [place], [place]. After speaking with her physician, it was decided that she isn't well enough to travel. She is staying another night for observation of SOA. Because we completed discharge, there is currently nothing to document in care plan/education.     Pt has had several anxiety related attacks today regarding diagnoses. She seems to be in better spirits after family/friends visited. Gave ativan x1 and morphine x1. Up ad angela, regular diet. Plan is to be discharged tomorrow. Continue to monitor.

## 2019-08-08 NOTE — PROGRESS NOTES
Subjective .     REASONS FOR FOLLOWUP: Metastatic rectal cancer on clinical trial with Dr. Floyd at [facility], on anti-CA 25 immunotherapy drug received 1 dose last week.    HISTORY OF PRESENT ILLNESS:  The patient is a 44 y.o. year old female who is here for follow-up with the above-mentioned history.    History of Present Illness    The patient is a 44 y.o. year old female who has been known to us due to her previous admission, however, she follows up with [facility] and most recently [facility] in [place] for her oncology care. This patient has metastatic rectal cancer with lung metastasis; previously failed chemotherapy, FOLFIRI and also immunotherapy Opdivo earlier this year. She was seen in 06/2019 here in this hospital by my colleagues, Dr. Moise and Dr. Romero. According to the patient, recently she was seen at the [facility] in [place], [place] and started on clinical trial with medication called CA25 which is IV medication for clinical trial phase 1b, under the care of Dr. Lynch. She just had 1st dose of treatment this past Wednesday, last week on 07/31/2019.      This patient presented yesterday to emergency room because of worsening dyspnea. She has significant exertional dyspnea. Patient reports today that she is hardly able to go to the bathroom due to her dyspnea. Patient was diagnosed exacerbation of asthma and she was given 1 dose of steroids in ER. This patient has history of asthma and she was reluctant to get more steroids due to the nature of her clinical trial which is kind of immunotherapy.      Nevertheless, the patient reports her breathing actually has been better after she started this clinical trial treatment. Nevertheless, she had worsening symptomatology yesterday. She reports no hemoptysis. No fever. No sweating.      This patient also reports today she had anemia, chronic. She had heavy menses in  01/2019 for about 3 months after she was started on Opdivo treatment. Because of that, she actually had surgical resection of one ovary in 04/2019. She did not have any menses until a couple of days ago. She reports no other bleeding otherwise.      Laboratory study this time in the emergency room reported hemoglobin 11.8, MCV 96.0, MCHC 32.6, platelets 223,000 and WBC 13,100 including ANC 9900, lymphocytes 1800, monocytes 700, eosinophils 540. Her chemistry lab reported normal liver function, normal creatinine 0.49 and normal electrolytes and glucose. Her troponin was negative. Negative proBNP. Lactate was 0.4 and procalcitonin was 0.04 and D-dimer 1.82.      Patient had angiogram study in the late night on 08/05/2019 and this study reported no acute pulmonary emboli. There was some increased extensive area of conglomerate nodularity throughout both lungs.     Interval history:    August 8 , 2019: Patient has had a nervous breakdown today.  She still continues to be short of breath when she walks and drops her oxygenation.  She refused bronchoscopy with biopsy.  She wanted to go to [facility] today but she called Dr. Floyd and they told her not to come right now and she is planning to go next week.  She did talk to her  that she may need to consider palliative care.  She is currently on Solu-Medrol 60 mg IV every 8 hours.  She is also on mini nebs and pulmonary is following her.  Her cough is better.  Pulmonary is continuing bronchodilators patient is on Symbicort as needed for mini nebs.  Pulmonary thinks that the worsening respiratory status is not due to immunotherapy related pneumonitis but could be progression.  Pulmonary thinks this is likely progression of metastasis.           Past Medical History:   Diagnosis Date   • Cancer (CMS/HCC)     METASTATIC STAGE 4 COLORECTAL TO LIVER AND LUNGS   • Pneumothorax     RIGHT LUNG       ONCOLOGIC HISTORY:  (History from previous dates can be  found in the separate document.)    No current facility-administered medications on file prior to encounter.      Current Outpatient Medications on File Prior to Encounter   Medication Sig Dispense Refill   • BROCCOLI EXTRACT PO Take 1 tablet by mouth Daily.     • Calcium Citrate 150 MG capsule Take 1 capsule by mouth Daily.     • Capsicum, Cayenne, (CAYENNE PEPPER PO) Take 1 tablet by mouth Daily.     • Cholecalciferol (VITAMIN D PO) Take 2,000 Units by mouth Daily.     • docusate sodium (COLACE) 100 MG capsule Take 100 mg by mouth 2 (Two) Times a Day.     • famotidine (PEPCID) 20 MG tablet Take 1 tablet by mouth 2 (Two) Times a Day for 60 days. 120 tablet 0   • fexofenadine (ALLEGRA) 180 MG tablet Take 180 mg by mouth Daily.     • gabapentin (NEURONTIN) 100 MG capsule Take 100 mg by mouth 2 (Two) Times a Day.     • Green Tea, Lorena sinensis, (GREEN TEA EXTRACT PO) Take 1 tablet by mouth Daily.     • lidocaine (LIDODERM) 5 % Place 1 patch on the skin Daily. Remove & Discard patch within 12 hours or as directed by MD     • Milk Thistle 1000 MG capsule 150 capsules As Needed.     • mirtazapine (REMERON) 15 MG tablet Take 15 mg by mouth Every Night.     • Morphine 10 MG/5ML solution Take 10 mg by mouth Every 2 (Two) Hours As Needed for Severe Pain . Takes 10 mg to 20 mg q 2 hours as needed     • Morphine Sulfate ER 15 MG tablet extended-release 12 hour Take  by mouth.     • norethindrone (MICRONOR) 0.35 MG tablet Take 1 tablet by mouth Every Evening.     • ondansetron (ZOFRAN) 8 MG tablet Take 8 mg by mouth Every 8 (Eight) Hours As Needed for Nausea or Vomiting.     • pantoprazole (PROTONIX) 40 MG EC tablet Take 40 mg by mouth As Needed. Only takes after chemo     • Pramoxine HCl (TUCKS HEMORRHOIDAL RE) Insert 1 application into the rectum As Needed.     • PREBIOTIC PRODUCT PO Take 1 tablet by mouth Daily.     • PROBIOTIC PRODUCT PO Take 1 Scoop by mouth Daily.     • prochlorperazine (COMPAZINE) 10 MG tablet Take  10 mg by mouth Every 6 (Six) Hours As Needed for Nausea or Vomiting.     • sennosides-docusate sodium (SENOKOT-S) 8.6-50 MG tablet Take 1 tablet by mouth Every Evening.     • TURMERIC PO Take 1 tablet by mouth Daily.     • vitamin B-12 (CYANOCOBALAMIN) 1000 MCG tablet Take 1,000 mcg by mouth Daily.     • zoledronic acid (ZOMETA) 4 MG/100ML solution infusion (premix) Infuse 4 mg into a venous catheter Every 3 (Three) Months.         ALLERGIES:   No Known Allergies    Social History     Socioeconomic History   • Marital status:      Spouse name: Not on file   • Number of children: Not on file   • Years of education: Not on file   • Highest education level: Not on file   Occupational History   • Occupation: housewife   Tobacco Use   • Smoking status: Never Smoker   • Smokeless tobacco: Never Used   Substance and Sexual Activity   • Alcohol use: No     Frequency: Never   • Drug use: Yes     Types: Morphine, Marijuana     Comment: gummy   • Sexual activity: Defer         Cancer-related family history includes Stomach cancer in her maternal grandfather. There is no history of Cancer.     Review of Systems  A comprehensive 14 point review of systems was performed and was negative except as mentioned.    Objective      Vitals:    08/08/19 0408 08/08/19 0701 08/08/19 0709 08/08/19 0803   BP: 91/58   105/68   BP Location: Right arm   Left arm   Patient Position: Lying   Lying   Pulse: 86 92 93 104   Resp: 18 16 18 18   Temp: 97.2 °F (36.2 °C)   97.7 °F (36.5 °C)   TempSrc: Oral   Oral   SpO2: 97% 95% 96% 100%   Weight:       Height:         No flowsheet data found.    Physical Exam    GENERAL: Patient very upset and anxious today  THROAT:  Oropharynx without lesions or exudates.  NECK:  Supple with good range of motion; no thyromegaly or masses, no JVD.  LYMPHATICS:  No cervical, supraclavicular, axillary or inguinal adenopathy.  CHEST: Bilateral wheezing  CARDIAC:  Regular rate and rhythm without murmurs, rubs or  gallops. Normal S1,S2.  ABDOMEN:  Soft, nontender with no hepatosplenomegaly or masses.  EXTREMITIES:  No clubbing, cyanosis or edema.  NEUROLOGICAL:  Cranial Nerves II-XII grossly intact.  No focal neurological deficits.  PSYCHIATRIC:  Normal affect and mood.        RECENT LABS:  Hematology WBC   Date Value Ref Range Status   08/08/2019 14.78 (H) 3.40 - 10.80 10*3/mm3 Final     RBC   Date Value Ref Range Status   08/08/2019 3.45 (L) 3.77 - 5.28 10*6/mm3 Final     Hemoglobin   Date Value Ref Range Status   08/08/2019 11.2 (L) 12.0 - 15.9 g/dL Final     Hematocrit   Date Value Ref Range Status   08/08/2019 34.0 34.0 - 46.6 % Final     Platelets   Date Value Ref Range Status   08/08/2019 208 140 - 450 10*3/mm3 Final   CT angiogram of the chest   No acute pulmonary thromboembolus is seen. The thoracic aorta measures  within normal size limits. There is no evidence of dissection. Right  internal jugular vein Mediport appears to terminate at the cavoatrial  junction. The thyroid gland and trachea appear within normal limits.  There is a small hiatal hernia. Mediastinal lymph nodes do not appear  pathologically enlarged. Innumerable reticulonodular infiltrates are  seen throughout both lungs. These appear to have worsened when compared  to prior exam. For example, an area within the right lower lobe measures  up to 6.6 x 3.27 mm, previously 6.0 x 3.0 cm. I suspect this reflects  progression of disease, but a superimposed infectious or inflammatory  process is not excluded. No acute abnormalities are seen within the  upper abdomen. There are trace bilateral pleural effusions. No  aggressive osseous abnormalities are seen.     IMPRESSION:     1. No acute pulmonary thromboembolus seen.  2. There has been some increase in extensive areas of conglomerate  nodularity seen throughout both lungs. This is favored to reflect  progression of disease, although superimposed infectious or inflammatory  infiltrate is completely  excluded.    Assessment/Plan        1.  Metastatic rectal cancer, undergoing phase 1B clinical trial.  At [facility], first dose given by Dr. Brand last week.  · Patient had metastatic colorectal cancer, diagnosed 2011.  Receives treatment through the [facility].  Last treatment was FOLFIRI Erbitux.  States she did not did not tolerate Erbitux and therefore Erbitux was dropped with plans to resume FOLFIRI this month.  · CT AP 6/12/2019, from 3/28/2019: New small pleural effusions with increase in suspected basilar pulmonary metastatic disease.  Decrease in circumscribed perirectal fluid collection 3.7 x 1.9 cm, from 7.8 x 5.7 cm.  No clear etiology for the pain.  Mild constipation.  § CT images personally viewed by me.  · MRI L-spine 6/12/2019: L2 and L3 metastasis with pathologic compression fracture of L3 no significant encroachment of spinal canal or neural foramina at any level in the lumbar spine.  · Left hip x-ray 6/12/2019: Negative.  · Dr. Jon had seen her in June 2019 and at that time she had back pain.  · Patient had undergone kyphoplasty by Dr. Benjamin lumbar third and had undergone biopsy at lumbar 3 vertebrae.  · Vertebral biopsy was consistent with moderately differentiated grade 2 colonic adenocarcinoma.  The neoplastic cells were judged to be negative for HER-2.  PDL 1 score was 0%  · Reviewed her recent CT scan, agree with pulmonary Dr. Scout Sun that this appears like progressive disease in the lung rather than pneumonitis.  However patient request to discuss with Dr. Romero.   · Dr. Jon will be here to discuss with the patient tomorrow as he has seen her before before.      2.  Asthma exacerbation.  Patient is better today.  Patient was seen by Dr. Flores pulmonologist this morning.  I spoke to Dr. Flores, currently patient has improved pulmonary condition, no need for steroids.  We will continue nebulizer.   I had talked to oncologist Dr. Lynch at [facility] today, patient is okay to have steroids.  Also with Dr. Lynch recommended bronchoscopic examination.   · Patient refused bronchoscopy        3.  Anemia.  I discussed with the patient for workup for her anemia. Patient insisted that this has not been looked into for a while.      · We obtained labs, there is no clear signs of deficiency of iron, B12 or folic acid.   · Her hemoglobin is 11.0 today, reasonable.     4. Anxiety. Patient seemed to respond to Ativan previously. She reports has not used Ativan for quite a while at home. Nevertheless, she has a bottle of Ativan 0.5 mg so I ordered oral Ativan for her anxiety. This can be given q.6 h. as needed and she is agreeable.    · On 8/7/2019, patient reports she is benefited from Ativan as needed.  It takes the edge away.  She appears more calm today.    Plan 1.  Continue steroids for now.  2. At time of discharge we may need to consider oral prednisone  3.  Patient refused bronchoscopy with biopsy  4.  Continue mini nebs  5.  Discussion done with patient that on reviewing CT I agree with Dr. Sun that this is all progressive disease.  Last CEA done June 17, 2019 was normal.  However her CEA is always been normal and cannot rely on that.      Rosmery Velasquez MD                             Cc:  James Loza MD

## 2019-08-08 NOTE — PROGRESS NOTES
Exercise Oximetry    Patient Name:Vita Ren   MRN: [id number]   Date: 08/08/19             ROOM AIR BASELINE   SpO2% 100   Heart Rate 106   Blood Pressure     EXERCISE ON ROOM AIR SpO2% EXERCISE ON O2 @  LPM SpO2%   1 MINUTE 96 1 MINUTE    2 MINUTES 99 2 MINUTES    3 MINUTES 93 3 MINUTES    4 MINUTES 97 4 MINUTES    5 MINUTES 94 5 MINUTES    6 MINUTES 96 6 MINUTES               Distance Walked  X 6 min Distance Walked   Dyspnea (Oscar Scale)   Dyspnea (Oscar Scale)   Fatigue (Oscar Scale)   Fatigue (Oscar Scale)   SpO2% Post Exercise 95 SpO2% Post Exercise   HR Post Exercise  110 HR Post Exercise   Time to Recovery  0 min Time to Recovery     Comments: Patient walked x 6 minute on room air with forehead probe.  o2 sat was above 94% during walk. Patient did c/o anxiety but no SOA.  Patient statd she did feel that she should have concentrator for supplemental o2 when she feels SOA.  Juan Carlos RN walked with Patient and myself during [place]  Good patient effort and cooperation

## 2019-08-09 VITALS
BODY MASS INDEX: 18.16 KG/M2 | HEIGHT: 66 IN | OXYGEN SATURATION: 95 % | DIASTOLIC BLOOD PRESSURE: 67 MMHG | HEART RATE: 101 BPM | TEMPERATURE: 98.3 F | RESPIRATION RATE: 18 BRPM | WEIGHT: 113 LBS | SYSTOLIC BLOOD PRESSURE: 100 MMHG

## 2019-08-09 LAB
ANION GAP SERPL CALCULATED.3IONS-SCNC: 10.6 MMOL/L (ref 5–15)
BASOPHILS # BLD AUTO: 0.02 10*3/MM3 (ref 0–0.2)
BASOPHILS NFR BLD AUTO: 0.1 % (ref 0–1.5)
BUN BLD-MCNC: 14 MG/DL (ref 6–20)
BUN/CREAT SERPL: 30.4 (ref 7–25)
CALCIUM SPEC-SCNC: 8.6 MG/DL (ref 8.6–10.5)
CHLORIDE SERPL-SCNC: 100 MMOL/L (ref 98–107)
CO2 SERPL-SCNC: 25.4 MMOL/L (ref 22–29)
CREAT BLD-MCNC: 0.46 MG/DL (ref 0.57–1)
DEPRECATED RDW RBC AUTO: 60.7 FL (ref 37–54)
EOSINOPHIL # BLD AUTO: 0 10*3/MM3 (ref 0–0.4)
EOSINOPHIL NFR BLD AUTO: 0 % (ref 0.3–6.2)
ERYTHROCYTE [DISTWIDTH] IN BLOOD BY AUTOMATED COUNT: 16.7 % (ref 12.3–15.4)
GFR SERPL CREATININE-BSD FRML MDRD: 149 ML/MIN/1.73
GLUCOSE BLD-MCNC: 126 MG/DL (ref 65–99)
HCT VFR BLD AUTO: 34.9 % (ref 34–46.6)
HGB BLD-MCNC: 11.3 G/DL (ref 12–15.9)
IMM GRANULOCYTES # BLD AUTO: 0.09 10*3/MM3 (ref 0–0.05)
IMM GRANULOCYTES NFR BLD AUTO: 0.5 % (ref 0–0.5)
LYMPHOCYTES # BLD AUTO: 0.66 10*3/MM3 (ref 0.7–3.1)
LYMPHOCYTES NFR BLD AUTO: 3.7 % (ref 19.6–45.3)
MCH RBC QN AUTO: 32.1 PG (ref 26.6–33)
MCHC RBC AUTO-ENTMCNC: 32.4 G/DL (ref 31.5–35.7)
MCV RBC AUTO: 99.1 FL (ref 79–97)
MONOCYTES # BLD AUTO: 0.67 10*3/MM3 (ref 0.1–0.9)
MONOCYTES NFR BLD AUTO: 3.7 % (ref 5–12)
NEUTROPHILS # BLD AUTO: 16.61 10*3/MM3 (ref 1.7–7)
NEUTROPHILS NFR BLD AUTO: 92 % (ref 42.7–76)
NRBC BLD AUTO-RTO: 0 /100 WBC (ref 0–0.2)
PLATELET # BLD AUTO: 251 10*3/MM3 (ref 140–450)
PMV BLD AUTO: 9.4 FL (ref 6–12)
POTASSIUM BLD-SCNC: 3.9 MMOL/L (ref 3.5–5.2)
RBC # BLD AUTO: 3.52 10*6/MM3 (ref 3.77–5.28)
SODIUM BLD-SCNC: 136 MMOL/L (ref 136–145)
WBC NRBC COR # BLD: 18.05 10*3/MM3 (ref 3.4–10.8)

## 2019-08-09 PROCEDURE — 80048 BASIC METABOLIC PNL TOTAL CA: CPT | Performed by: HOSPITALIST

## 2019-08-09 PROCEDURE — 94799 UNLISTED PULMONARY SVC/PX: CPT

## 2019-08-09 PROCEDURE — 99232 SBSQ HOSP IP/OBS MODERATE 35: CPT | Performed by: INTERNAL MEDICINE

## 2019-08-09 PROCEDURE — 25010000002 METHYLPREDNISOLONE PER 125 MG: Performed by: INTERNAL MEDICINE

## 2019-08-09 PROCEDURE — 94618 PULMONARY STRESS TESTING: CPT

## 2019-08-09 PROCEDURE — 85025 COMPLETE CBC W/AUTO DIFF WBC: CPT | Performed by: HOSPITALIST

## 2019-08-09 RX ORDER — IPRATROPIUM BROMIDE AND ALBUTEROL SULFATE 2.5; .5 MG/3ML; MG/3ML
3 SOLUTION RESPIRATORY (INHALATION)
Qty: 360 ML | Refills: 0 | Status: SHIPPED | OUTPATIENT
Start: 2019-08-09 | End: 2019-08-28 | Stop reason: HOSPADM

## 2019-08-09 RX ORDER — BUDESONIDE AND FORMOTEROL FUMARATE DIHYDRATE 160; 4.5 UG/1; UG/1
2 AEROSOL RESPIRATORY (INHALATION)
Qty: 20.4 G | Refills: 0 | Status: SHIPPED | OUTPATIENT
Start: 2019-08-09 | End: 2019-08-28 | Stop reason: HOSPADM

## 2019-08-09 RX ORDER — PREDNISONE 20 MG/1
40 TABLET ORAL
Status: DISCONTINUED | OUTPATIENT
Start: 2019-08-09 | End: 2019-08-09 | Stop reason: HOSPADM

## 2019-08-09 RX ORDER — PREDNISONE 20 MG/1
40 TABLET ORAL
Qty: 8 TABLET | Refills: 0 | Status: SHIPPED | OUTPATIENT
Start: 2019-08-09 | End: 2019-08-13

## 2019-08-09 RX ORDER — GUAIFENESIN 600 MG/1
600 TABLET, EXTENDED RELEASE ORAL EVERY 12 HOURS SCHEDULED
Qty: 60 TABLET | Refills: 0 | Status: SHIPPED | OUTPATIENT
Start: 2019-08-09 | End: 2019-08-28 | Stop reason: HOSPADM

## 2019-08-09 RX ADMIN — DOCUSATE SODIUM 100 MG: 100 CAPSULE, LIQUID FILLED ORAL at 09:35

## 2019-08-09 RX ADMIN — BUDESONIDE AND FORMOTEROL FUMARATE DIHYDRATE 2 PUFF: 160; 4.5 AEROSOL RESPIRATORY (INHALATION) at 10:58

## 2019-08-09 RX ADMIN — METHYLPREDNISOLONE SODIUM SUCCINATE 60 MG: 125 INJECTION, POWDER, FOR SOLUTION INTRAMUSCULAR; INTRAVENOUS at 07:05

## 2019-08-09 RX ADMIN — CALCIUM CARBONATE-VITAMIN D TAB 500 MG-200 UNIT 1000 MG: 500-200 TAB at 09:34

## 2019-08-09 RX ADMIN — IPRATROPIUM BROMIDE AND ALBUTEROL SULFATE 3 ML: 2.5; .5 SOLUTION RESPIRATORY (INHALATION) at 15:34

## 2019-08-09 RX ADMIN — GUAIFENESIN 600 MG: 600 TABLET, EXTENDED RELEASE ORAL at 09:34

## 2019-08-09 RX ADMIN — Medication 1000 MCG: at 09:34

## 2019-08-09 RX ADMIN — Medication 250 MG: at 09:34

## 2019-08-09 RX ADMIN — LORAZEPAM 0.5 MG: 0.5 TABLET ORAL at 09:19

## 2019-08-09 RX ADMIN — PANTOPRAZOLE SODIUM 40 MG: 40 TABLET, DELAYED RELEASE ORAL at 05:48

## 2019-08-09 RX ADMIN — IPRATROPIUM BROMIDE AND ALBUTEROL SULFATE 3 ML: 2.5; .5 SOLUTION RESPIRATORY (INHALATION) at 06:49

## 2019-08-09 RX ADMIN — IPRATROPIUM BROMIDE AND ALBUTEROL SULFATE 3 ML: 2.5; .5 SOLUTION RESPIRATORY (INHALATION) at 00:30

## 2019-08-09 RX ADMIN — VITAMIN D, TAB 1000IU (100/BT) 2000 UNITS: 25 TAB at 09:34

## 2019-08-09 RX ADMIN — Medication 500 UNITS: at 17:30

## 2019-08-09 RX ADMIN — MORPHINE SULFATE 15 MG: 15 TABLET, FILM COATED, EXTENDED RELEASE ORAL at 07:30

## 2019-08-09 RX ADMIN — CETIRIZINE HYDROCHLORIDE 10 MG: 10 TABLET, FILM COATED ORAL at 09:34

## 2019-08-09 RX ADMIN — LIDOCAINE 1 PATCH: 50 PATCH TOPICAL at 09:35

## 2019-08-09 RX ADMIN — GABAPENTIN 100 MG: 100 CAPSULE ORAL at 07:36

## 2019-08-09 RX ADMIN — MORPHINE SULFATE 10 MG: 100 SOLUTION ORAL at 05:53

## 2019-08-09 NOTE — DISCHARGE SUMMARY
Discharge summary    Date of admission 8/5/2019  Date of discharge 8/9/2019    Final diagnosis  Acute exacerbation of asthma  Metastatic CA to the lung  Colorectal CA  Neuropathy  Chronic pain syndrome  Chronic anemia  Gastroesophageal reflux disease    Discharge medications    Current Facility-Administered Medications:   •  budesonide-formoterol (SYMBICORT) 160-4.5 MCG/ACT inhaler 2 puff, 2 puff, Inhalation, BID - RT, James Loza MD, 2 puff at 08/09/19 1058  •  calcium-vitamin D 500-200 MG-UNIT tablet 1,000 mg, 1,000 mg, Oral, Daily, James Loza MD, 1,000 mg at 08/09/19 0934  •  cetirizine (zyrTEC) tablet 10 mg, 10 mg, Oral, Daily, James Loza MD, 10 mg at 08/09/19 0934  •  cholecalciferol (VITAMIN D3) tablet 2,000 Units, 2,000 Units, Oral, Daily, James Loza MD, 2,000 Units at 08/09/19 0934  •  docusate sodium (COLACE) capsule 100 mg, 100 mg, Oral, BID, James Loza MD, 100 mg at 08/09/19 0935  •  gabapentin (NEURONTIN) capsule 100 mg, 100 mg, Oral, BID, James Loza MD, 100 mg at 08/09/19 0736  •  guaiFENesin (MUCINEX) 12 hr tablet 600 mg, 600 mg, Oral, Q12H, James Loza MD, 600 mg at 08/09/19 0934  •  ipratropium-albuterol (DUO-NEB) nebulizer solution 3 mL, 3 mL, Nebulization, Q4H - RT, James Loza MD, 3 mL at 08/09/19 0649  •  ipratropium-albuterol (DUO-NEB) nebulizer solution 3 mL, 3 mL, Nebulization, Q6H PRN, Connie Christian MD, 3 mL at 08/06/19 2023  •  lidocaine (LIDODERM) 5 % 2 patch, 2 patch, Transdermal, Q24H, James Loza MD, 1 patch at 08/09/19 0935  •  LORazepam (ATIVAN) tablet 0.5 mg, 0.5 mg, Oral, Q6H PRN, Gloria iGlbert MD PhD, 0.5 mg at 08/09/19 0919  •  mirtazapine (REMERON) tablet 15 mg, 15 mg, Oral, Nightly, James Loza MD, 15 mg at 08/08/19 2226  •  Morphine (MS CONTIN) 12 hr tablet 15 mg, 15 mg, Oral, Q12H, James Loza MD, 15 mg at 08/09/19 0730  •  morphine concentrated solution solution 10 mg, 10 mg, Oral, Q2H PRN, 10 mg at 08/09/19 0553 **OR** morphine concentrated  solution solution 20 mg, 20 mg, Oral, Q2H PRN, James Loza MD  •  ondansetron (ZOFRAN) tablet 8 mg, 8 mg, Oral, Q8H PRN, James Loza MD  •  pantoprazole (PROTONIX) EC tablet 40 mg, 40 mg, Oral, Q AM, James Loza MD, 40 mg at 08/09/19 0548  •  predniSONE (DELTASONE) tablet 40 mg, 40 mg, Oral, Daily With Breakfast, James Loza MD  •  prochlorperazine (COMPAZINE) tablet 10 mg, 10 mg, Oral, Q6H PRN, James Loza MD  •  saccharomyces boulardii (FLORASTOR) capsule 250 mg, 250 mg, Oral, BID, James Loza MD, 250 mg at 08/09/19 0934  •  senna (SENOKOT) tablet 17.2 mg, 2 tablet, Oral, BID PRN, Gloria Gilbert MD PhD, 17.2 mg at 08/07/19 1439  •  [COMPLETED] Insert peripheral IV, , , Once **AND** sodium chloride 0.9 % flush 10 mL, 10 mL, Intravenous, PRN, Dimitri Whitman MD  •  vitamin B-12 (CYANOCOBALAMIN) tablet 1,000 mcg, 1,000 mcg, Oral, Daily, James Loza MD, 1,000 mcg at 08/09/19 0934     Consults obtained  Hematology oncology  Pulmonary    Procedures  None    Hospital course  44-year-old white female with history of colorectal CA stage IV with mets to the lung also have asthma admit to the emergency room with shortness of breath.  Patient admitted treated with supplemental oxygen nebulizer IV steroids and also Symbicort added on this admission.  Patient followed by hematology oncology and pulmonary.  Initially it was thought she might need bronchoscopy but she has a recent bronchoscopy done at [facility].  Patient improved with steroids but then she declined using steroids and Symbicort and after long talk with her she agreed and once her steroids resume and she took Symbicort she responded.  Patient also received anxiety medication.  Patient followed by pulmonary and oncology recommend oral steroids for 4 more days and continue Symbicort twice a day at home.  Patient also will continue nebulizer treatment every 4 hours and as needed.  Patient is feeling much better and wants to go home and clear from  discharge.  Patient pain very well controlled with current medication prescription written by oncology for pain.    Discharge diet regular    Activity as tolerated    Medication as above    Follow-up with primary doctor in 1 week and follow-up with oncology and pulmonary per their instruction and take medication as directed    MILDRED PANDYA MD

## 2019-08-09 NOTE — PROGRESS NOTES
Exercise Oximetry    Patient Name:Vita Ren   MRN: [id number]   Date: 08/09/19             ROOM AIR BASELINE   SpO2% 95   Heart Rate 96   Blood Pressure      EXERCISE ON ROOM AIR SpO2% EXERCISE ON O2 @  LPM SpO2%   1 MINUTE 93 1 MINUTE    2 MINUTES 91 2 MINUTES    3 MINUTES 90 3 MINUTES    4 MINUTES 90 4 MINUTES    5 MINUTES 91 5 MINUTES    6 MINUTES 92 6 MINUTES               Distance Walked  X 6 min Distance Walked   Dyspnea (Oscar Scale)   Dyspnea (Oscar Scale)   Fatigue (Oscar Scale)   Fatigue (Oscar Scale)   SpO2% Post Exercise  93 SpO2% Post Exercise   HR Post Exercise  108 HR Post Exercise   Time to Recovery   Time to Recovery     Comments:  Walked with walker twice around unit. Walked with hand probe  Then switched to head probe. Able to keep up good conversation with walk. No soa of air noted

## 2019-08-09 NOTE — PROGRESS NOTES
[facility]     Mar/chart reviewed  Follow up asthma with acute exacerbation, cancer with mets to lung  Says she feels much better,    Vital Sign Min/Max for last 24 hours  Temp  Min: 97.2 °F (36.2 °C)  Max: 97.6 °F (36.4 °C)   BP  Min: 107/71  Max: 114/64   Pulse  Min: 74  Max: 116   Resp  Min: 16  Max: 18   SpO2  Min: 94 %  Max: 100 %   Flow (L/min)  Min: 2  Max: 2   No Data Recorded     Nad, axox3,   perrl, eomi, no icterus,  mmm, no jvd, trachea midline, neck supple,  chest cta bilaterally, no crackles, no wheezes,   rrr,   soft, nt, nd +bs,  no c/c/ e    Labs: 8/9: reviewed:  Cr 0.46  Bicarb 25  Wbc 18  hgb 11.3  plts 251      A/P:  1. Asthma with acute exacerbation -- she has elevated eosinophils as well.  Continue bronchodilators.  this seems better, I think she had improved ok without the prednisone but request made from [facility] for her to continue this.  I would recommend she use symbicort and prn nebs.  2. Extensive metastatic disease to the lungs from known colon cancer -- she is on immunotherapy -- I don't think her worsening respiratory status is due to pneumonitis from immunotherapy -- I will defer to her physicians at [facility], they had requested bronch.  I don't feel it is needed to rule that out.   3. Chronic pain -- continue home meds  4. Anemia -- doesn't seem so bad right now, but she says her anemia in the past has made her worse.     Ok to d/c from my standpoint, would continue the prednisone at 40mg daily for 4 days. Continue symbicort two puffs bid    She can follow up with me in office as needed. I have provided her with my contact information.

## 2019-08-09 NOTE — PROGRESS NOTES
Subjective .     REASONS FOR FOLLOWUP: Metastatic rectal cancer on clinical trial with Dr. Floyd at [facility], on anti-CA 25 immunotherapy drug received 1 dose last week.    HISTORY OF PRESENT ILLNESS:  The patient is a 44 y.o. year old female who is here for follow-up with the above-mentioned history.    History of Present Illness    The patient is a 44 y.o. year old female who has been known to us due to her previous admission, however, she follows up with [facility] and most recently [facility] in [place] for her oncology care. This patient has metastatic rectal cancer with lung metastasis; previously failed chemotherapy, FOLFIRI and also immunotherapy Opdivo earlier this year. She was seen in 06/2019 here in this hospital by my colleagues, Dr. Moise and Dr. Romero. According to the patient, recently she was seen at the [facility] in [place], [place] and started on clinical trial with medication called CA25 which is IV medication for clinical trial phase 1b, under the care of Dr. Lynch. She just had 1st dose of treatment this past Wednesday, last week on 07/31/2019.      This patient presented yesterday to emergency room because of worsening dyspnea. She has significant exertional dyspnea. Patient reports today that she is hardly able to go to the bathroom due to her dyspnea. Patient was diagnosed exacerbation of asthma and she was given 1 dose of steroids in ER. This patient has history of asthma and she was reluctant to get more steroids due to the nature of her clinical trial which is kind of immunotherapy.      Nevertheless, the patient reports her breathing actually has been better after she started this clinical trial treatment. Nevertheless, she had worsening symptomatology yesterday. She reports no hemoptysis. No fever. No sweating.      This patient also reports today she had anemia, chronic. She had heavy menses in  01/2019 for about 3 months after she was started on Opdivo treatment. Because of that, she actually had surgical resection of one ovary in 04/2019. She did not have any menses until a couple of days ago. She reports no other bleeding otherwise.      Laboratory study this time in the emergency room reported hemoglobin 11.8, MCV 96.0, MCHC 32.6, platelets 223,000 and WBC 13,100 including ANC 9900, lymphocytes 1800, monocytes 700, eosinophils 540. Her chemistry lab reported normal liver function, normal creatinine 0.49 and normal electrolytes and glucose. Her troponin was negative. Negative proBNP. Lactate was 0.4 and procalcitonin was 0.04 and D-dimer 1.82.      Patient had angiogram study in the late night on 08/05/2019 and this study reported no acute pulmonary emboli. There was some increased extensive area of conglomerate nodularity throughout both lungs.     Interval history:    August 8 , 2019: Patient has had a nervous breakdown today.  She still continues to be short of breath when she walks and drops her oxygenation.  She refused bronchoscopy with biopsy.  She wanted to go to [facility] today but she called Dr. Floyd and they told her not to come right now and she is planning to go next week.  She did talk to her  that she may need to consider palliative care.  She is currently on Solu-Medrol 60 mg IV every 8 hours.  She is also on mini nebs and pulmonary is following her.  Her cough is better.  Pulmonary is continuing bronchodilators patient is on Symbicort as needed for mini nebs.  Pulmonary thinks that the worsening respiratory status is not due to immunotherapy related pneumonitis but could be progression.  Pulmonary thinks this is likely progression of metastasis.    August 9, 2019: Patient less anxious today.  She usually follows up with Dr. Dailey at the [facility] but wants to follow-up with Dr. Jon in 2 to 3 weeks.  She says her breathing is much better after she  was started on the steroids.  We will let pulmonary adjust  steroids.           Past Medical History:   Diagnosis Date   • Cancer (CMS/HCC)     METASTATIC STAGE 4 COLORECTAL TO LIVER AND LUNGS   • Pneumothorax     RIGHT LUNG       ONCOLOGIC HISTORY:  (History from previous dates can be found in the separate document.)    No current facility-administered medications on file prior to encounter.      Current Outpatient Medications on File Prior to Encounter   Medication Sig Dispense Refill   • BROCCOLI EXTRACT PO Take 1 tablet by mouth Daily.     • Calcium Citrate 150 MG capsule Take 1 capsule by mouth Daily.     • Capsicum, Cayenne, (CAYENNE PEPPER PO) Take 1 tablet by mouth Daily.     • Cholecalciferol (VITAMIN D PO) Take 2,000 Units by mouth Daily.     • docusate sodium (COLACE) 100 MG capsule Take 100 mg by mouth 2 (Two) Times a Day.     • famotidine (PEPCID) 20 MG tablet Take 1 tablet by mouth 2 (Two) Times a Day for 60 days. 120 tablet 0   • fexofenadine (ALLEGRA) 180 MG tablet Take 180 mg by mouth Daily.     • gabapentin (NEURONTIN) 100 MG capsule Take 100 mg by mouth 2 (Two) Times a Day.     • Green Tea, Lorena sinensis, (GREEN TEA EXTRACT PO) Take 1 tablet by mouth Daily.     • lidocaine (LIDODERM) 5 % Place 1 patch on the skin Daily. Remove & Discard patch within 12 hours or as directed by MD     • Milk Thistle 1000 MG capsule 150 capsules As Needed.     • mirtazapine (REMERON) 15 MG tablet Take 15 mg by mouth Every Night.     • Morphine 10 MG/5ML solution Take 10 mg by mouth Every 2 (Two) Hours As Needed for Severe Pain . Takes 10 mg to 20 mg q 2 hours as needed     • Morphine Sulfate ER 15 MG tablet extended-release 12 hour Take  by mouth.     • norethindrone (MICRONOR) 0.35 MG tablet Take 1 tablet by mouth Every Evening.     • ondansetron (ZOFRAN) 8 MG tablet Take 8 mg by mouth Every 8 (Eight) Hours As Needed for Nausea or Vomiting.     • pantoprazole (PROTONIX) 40 MG EC tablet Take 40 mg by mouth As  Needed. Only takes after chemo     • Pramoxine HCl (TUCKS HEMORRHOIDAL RE) Insert 1 application into the rectum As Needed.     • PREBIOTIC PRODUCT PO Take 1 tablet by mouth Daily.     • PROBIOTIC PRODUCT PO Take 1 Scoop by mouth Daily.     • prochlorperazine (COMPAZINE) 10 MG tablet Take 10 mg by mouth Every 6 (Six) Hours As Needed for Nausea or Vomiting.     • sennosides-docusate sodium (SENOKOT-S) 8.6-50 MG tablet Take 1 tablet by mouth Every Evening.     • TURMERIC PO Take 1 tablet by mouth Daily.     • vitamin B-12 (CYANOCOBALAMIN) 1000 MCG tablet Take 1,000 mcg by mouth Daily.     • zoledronic acid (ZOMETA) 4 MG/100ML solution infusion (premix) Infuse 4 mg into a venous catheter Every 3 (Three) Months.         ALLERGIES:   No Known Allergies    Social History     Socioeconomic History   • Marital status:      Spouse name: Not on file   • Number of children: Not on file   • Years of education: Not on file   • Highest education level: Not on file   Occupational History   • Occupation: housewife   Tobacco Use   • Smoking status: Never Smoker   • Smokeless tobacco: Never Used   Substance and Sexual Activity   • Alcohol use: No     Frequency: Never   • Drug use: Yes     Types: Morphine, Marijuana     Comment: gummy   • Sexual activity: Defer         Cancer-related family history includes Stomach cancer in her maternal grandfather. There is no history of Cancer.     Review of Systems   Respiratory: Positive for shortness of breath.      A comprehensive 14 point review of systems was performed and was negative except as mentioned.    Objective      Vitals:    08/09/19 0421 08/09/19 0649 08/09/19 0815 08/09/19 1150   BP: 114/64  107/71 100/67   BP Location: Right arm  Right arm Left arm   Patient Position: Lying  Lying Lying   Pulse: 89 74 107 101   Resp: 18 18 18 18   Temp: 97.6 °F (36.4 °C)  97.3 °F (36.3 °C) 98.3 °F (36.8 °C)   TempSrc: Oral  Oral Oral   SpO2: 94% 94% 99% 97%   Weight:       Height:          No flowsheet data found.    Physical Exam      GENERAL: Patient is less anxious today   CHEST: Bilateral wheezing has significantly improved   cARDIAC:  Regular rate and rhythm without murmurs, rubs or gallops. Normal S1,S2.  ABDOMEN:  Soft, nontender with no hepatosplenomegaly or masses.  EXTREMITIES:  No clubbing, cyanosis or edema.  NEUROLOGICAL:  Cranial Nerves II-XII grossly intact.  No focal neurological deficits.  PSYCHIATRIC:  Normal affect and mood.        RECENT LABS:  Hematology WBC   Date Value Ref Range Status   08/09/2019 18.05 (H) 3.40 - 10.80 10*3/mm3 Final     RBC   Date Value Ref Range Status   08/09/2019 3.52 (L) 3.77 - 5.28 10*6/mm3 Final     Hemoglobin   Date Value Ref Range Status   08/09/2019 11.3 (L) 12.0 - 15.9 g/dL Final     Hematocrit   Date Value Ref Range Status   08/09/2019 34.9 34.0 - 46.6 % Final     Platelets   Date Value Ref Range Status   08/09/2019 251 140 - 450 10*3/mm3 Final   CT angiogram of the chest   No acute pulmonary thromboembolus is seen. The thoracic aorta measures  within normal size limits. There is no evidence of dissection. Right  internal jugular vein Mediport appears to terminate at the cavoatrial  junction. The thyroid gland and trachea appear within normal limits.  There is a small hiatal hernia. Mediastinal lymph nodes do not appear  pathologically enlarged. Innumerable reticulonodular infiltrates are  seen throughout both lungs. These appear to have worsened when compared  to prior exam. For example, an area within the right lower lobe measures  up to 6.6 x 3.27 mm, previously 6.0 x 3.0 cm. I suspect this reflects  progression of disease, but a superimposed infectious or inflammatory  process is not excluded. No acute abnormalities are seen within the  upper abdomen. There are trace bilateral pleural effusions. No  aggressive osseous abnormalities are seen.     IMPRESSION:     1. No acute pulmonary thromboembolus seen.  2. There has been some increase in  extensive areas of conglomerate  nodularity seen throughout both lungs. This is favored to reflect  progression of disease, although superimposed infectious or inflammatory  infiltrate is completely excluded.    Assessment/Plan        1.  Metastatic rectal cancer, undergoing phase 1B clinical trial.  At [facility], first dose given by Dr. Brand last week.  · Patient had metastatic colorectal cancer, diagnosed 2011.  Receives treatment through the [facility].  Last treatment was FOLFIRI Erbitux.  States she did not did not tolerate Erbitux and therefore Erbitux was dropped with plans to resume FOLFIRI this month.  · CT AP 6/12/2019, from 3/28/2019: New small pleural effusions with increase in suspected basilar pulmonary metastatic disease.  Decrease in circumscribed perirectal fluid collection 3.7 x 1.9 cm, from 7.8 x 5.7 cm.  No clear etiology for the pain.  Mild constipation.  § CT images personally viewed by me.  · MRI L-spine 6/12/2019: L2 and L3 metastasis with pathologic compression fracture of L3 no significant encroachment of spinal canal or neural foramina at any level in the lumbar spine.  · Left hip x-ray 6/12/2019: Negative.  · Dr. Jon had seen her in June 2019 and at that time she had back pain.  · Patient had undergone kyphoplasty by Dr. Benjamin lumbar third and had undergone biopsy at lumbar 3 vertebrae.  · Vertebral biopsy was consistent with moderately differentiated grade 2 colonic adenocarcinoma.  The neoplastic cells were judged to be negative for HER-2.  PDL 1 score was 0%  · Reviewed her recent CT scan, agree with pulmonary Dr. Scout Sun that this appears like progressive disease in the lung rather than pneumonitis.  However patient request to discuss with Dr. Romero.   · If patient is discharged we will schedule follow-up appointment with Dr. Jon in 2 to 3 weeks.      2.  Asthma exacerbation.  Patient is better today.  Patient was seen by Dr. Flores  pulmonologist this morning.  I spoke to Dr. Flores, currently patient has improved pulmonary condition, no need for steroids.  We will continue nebulizer.   I had talked to oncologist Dr. Lynch at [facility] today, patient is okay to have steroids.  Also with Dr. Lynch recommended bronchoscopic examination.   · Patient refused bronchoscopy  · Asthma has improved significantly and her breathing has much better.  There is decrease in wheezing.        3.  Anemia.  I discussed with the patient for workup for her anemia. Patient insisted that this has not been looked into for a while.      · We obtained labs, there is no clear signs of deficiency of iron, B12 or folic acid.   · Her hemoglobin is 11.0 today, reasonable.     4. Anxiety. Patient seemed to respond to Ativan previously. She reports has not used Ativan for quite a while at home. Nevertheless, she has a bottle of Ativan 0.5 mg so I ordered oral Ativan for her anxiety. This can be given q.6 h. as needed and she is agreeable.    · On 8/7/2019, patient reports she is benefited from Ativan as needed.  It takes the edge away.  She appears more calm today.    Plan 1.  Continue steroids for now.  We will let pulmonary adjust the steroid dose  2  Patient refused bronchoscopy with biopsy  3.   Continue mini nebs   4.  If patient will be discharged then she needs follow-up with Dr. Jon in about 2 to 3 weeks in our office.  We will schedule that.  5.  Patient will continue on the study drug as per [facility] Dr. Lynch.  She already has an appointment with them    Will sign off and follow-up in the office    Rosmery Velasquez MD                             Cc:  James Loza MD

## 2019-08-09 NOTE — PROGRESS NOTES
"Daily progress note    Chief complaint  Doing better  No new complaints  Wants to go home  Family at bedside    History of present illness  44-year-old white female with history of colorectal CA stage IV with mets to the lung and also have asthma presented to [facility] emergency room with shortness of breath for last 1 week with nonproductive cough.  Patient has been wheezing.  Patient denies any fever chills chest pain abdominal pain nausea vomiting diarrhea.  Patient evaluated in ER found to be acute asthma flare admit for management.     REVIEW OF SYSTEMS  Remarkable for shortness of breath    PHYSICAL EXAM  Blood pressure 100/67, pulse 101, temperature 98.3 °F (36.8 °C), temperature source Oral, resp. rate 18, height 167.6 cm (66\"), weight 51.3 kg (113 lb), SpO2 97 %, not currently breastfeeding.    GENERAL: well developed, well nourished in moderate distress and emotional  HENT: NCAT, neck supple, trachea midline  EYES: no scleral icterus, PERRL, normal conjunctiva  CV: Tachycardic, regular rate, no murmur, power port in right upper chest  RESPIRATORY: Moderate respiratory distress, diffuse wheezing, tachypnea  ABDOMEN: soft, non-tender, non-distended, bowel sounds present  MUSCULOSKELETAL: no gross deformity  NEURO: alert,  sensory and motor function of extremities grossly intact, speech clear, mental status normal/baseline  SKIN: warm, dry, no rash  PSYCH: she is anxious and emotional    LAB RESULTS  Lab Results (last 24 hours)     Procedure Component Value Units Date/Time    Basic Metabolic Panel [[id number]]  (Abnormal) Collected:  08/09/19 0543    Specimen:  Blood Updated:  08/09/19 0751     Glucose 126 mg/dL      BUN 14 mg/dL      Creatinine 0.46 mg/dL      Sodium 136 mmol/L      Potassium 3.9 mmol/L      Chloride 100 mmol/L      CO2 25.4 mmol/L      Calcium 8.6 mg/dL      eGFR Non African Amer 149 mL/min/1.73      BUN/Creatinine Ratio 30.4     Anion Gap 10.6 mmol/L     Narrative:       GFR " Normal >60  Chronic Kidney Disease <60  Kidney Failure <15    CBC & Differential [[id number]] Collected:  08/09/19 0543    Specimen:  Blood Updated:  08/09/19 0712    Narrative:       The following orders were created for panel order CBC & Differential.  Procedure                               Abnormality         Status                     ---------                               -----------         ------                     CBC Auto Differential[[id number]]        Abnormal            Final result                 Please view results for these tests on the individual orders.    CBC Auto Differential [[id number]]  (Abnormal) Collected:  08/09/19 0543    Specimen:  Blood Updated:  08/09/19 0712     WBC 18.05 10*3/mm3      RBC 3.52 10*6/mm3      Hemoglobin 11.3 g/dL      Hematocrit 34.9 %      MCV 99.1 fL      MCH 32.1 pg      MCHC 32.4 g/dL      RDW 16.7 %      RDW-SD 60.7 fl      MPV 9.4 fL      Platelets 251 10*3/mm3      Neutrophil % 92.0 %      Lymphocyte % 3.7 %      Monocyte % 3.7 %      Eosinophil % 0.0 %      Basophil % 0.1 %      Immature Grans % 0.5 %      Neutrophils, Absolute 16.61 10*3/mm3      Lymphocytes, Absolute 0.66 10*3/mm3      Monocytes, Absolute 0.67 10*3/mm3      Eosinophils, Absolute 0.00 10*3/mm3      Basophils, Absolute 0.02 10*3/mm3      Immature Grans, Absolute 0.09 10*3/mm3      nRBC 0.0 /100 WBC         Imaging Results (last 24 hours)     ** No results found for the last 24 hours. **            Current Facility-Administered Medications:   •  budesonide-formoterol (SYMBICORT) 160-4.5 MCG/ACT inhaler 2 puff, 2 puff, Inhalation, BID - RT, James Loza MD, 2 puff at 08/09/19 1058  •  calcium-vitamin D 500-200 MG-UNIT tablet 1,000 mg, 1,000 mg, Oral, Daily, James Loza MD, 1,000 mg at 08/09/19 0934  •  cetirizine (zyrTEC) tablet 10 mg, 10 mg, Oral, Daily, James Loza MD, 10 mg at 08/09/19 0934  •  cholecalciferol (VITAMIN D3) tablet 2,000 Units, 2,000 Units, Oral, Daily, James Loza MD,  2,000 Units at 08/09/19 0934  •  docusate sodium (COLACE) capsule 100 mg, 100 mg, Oral, BID, James Loza MD, 100 mg at 08/09/19 0935  •  gabapentin (NEURONTIN) capsule 100 mg, 100 mg, Oral, BID, James Loza MD, 100 mg at 08/09/19 0736  •  guaiFENesin (MUCINEX) 12 hr tablet 600 mg, 600 mg, Oral, Q12H, James Loza MD, 600 mg at 08/09/19 0934  •  ipratropium-albuterol (DUO-NEB) nebulizer solution 3 mL, 3 mL, Nebulization, Q4H - RT, James Loza MD, 3 mL at 08/09/19 0649  •  ipratropium-albuterol (DUO-NEB) nebulizer solution 3 mL, 3 mL, Nebulization, Q6H PRN, Connie Christian MD, 3 mL at 08/06/19 2023  •  lidocaine (LIDODERM) 5 % 2 patch, 2 patch, Transdermal, Q24H, James Loza MD, 1 patch at 08/09/19 0935  •  LORazepam (ATIVAN) tablet 0.5 mg, 0.5 mg, Oral, Q6H PRN, Gloria Gilbert MD PhD, 0.5 mg at 08/09/19 0919  •  methylPREDNISolone sodium succinate (SOLU-Medrol) injection 60 mg, 60 mg, Intravenous, Q8H, Rosmery Velasquez MD, 60 mg at 08/09/19 0705  •  mirtazapine (REMERON) tablet 15 mg, 15 mg, Oral, Nightly, James Loza MD, 15 mg at 08/08/19 2226  •  Morphine (MS CONTIN) 12 hr tablet 15 mg, 15 mg, Oral, Q12H, James Loza MD, 15 mg at 08/09/19 0730  •  morphine concentrated solution solution 10 mg, 10 mg, Oral, Q2H PRN, 10 mg at 08/09/19 0553 **OR** morphine concentrated solution solution 20 mg, 20 mg, Oral, Q2H PRN, James Loza MD  •  ondansetron (ZOFRAN) tablet 8 mg, 8 mg, Oral, Q8H PRN, James Loza MD  •  pantoprazole (PROTONIX) EC tablet 40 mg, 40 mg, Oral, Q AM, James Loza MD, 40 mg at 08/09/19 0548  •  prochlorperazine (COMPAZINE) tablet 10 mg, 10 mg, Oral, Q6H PRN, James Loza MD  •  saccharomyces boulardii (FLORASTOR) capsule 250 mg, 250 mg, Oral, BID, James Loza MD, 250 mg at 08/09/19 0934  •  senna (SENOKOT) tablet 17.2 mg, 2 tablet, Oral, BID PRN, Gloria Gilbert MD PhD, 17.2 mg at 08/07/19 1439  •  [COMPLETED] Insert peripheral IV, , , Once **AND** sodium chloride 0.9 % flush 10  mL, 10 mL, Intravenous, PRN, Dimitri Whitman MD  •  vitamin B-12 (CYANOCOBALAMIN) tablet 1,000 mcg, 1,000 mcg, Oral, Daily, Mildred Pandya MD, 1,000 mcg at 08/09/19 0934     ASSESSMENT  Acute exacerbation of asthma  Metastatic CA to the lung  Colorectal CA  Neuropathy  Chronic pain syndrome  Chronic anemia  Gastroesophageal reflux disease    PLAN  Discharge home on prednisone 40 mg daily for 4 more days  Discharge summary dictated    MILDRED PANDYA MD

## 2019-08-09 NOTE — PROGRESS NOTES
Continued Stay Note  [facility]     Patient Name: Vita Ren  MRN: [id number]  Today's Date: 8/9/2019    Admit Date: 8/5/2019    Discharge Plan     Row Name 08/09/19 1008       Plan    Plan Comments  S/w Kun with [facility] ([phone]/ fax [phone]) re: qualifying sat for portable concentrator. Currently no qualifying sat documented/ d/w RN who will discuss with RT today.                   Albert Louise RN

## 2019-08-09 NOTE — PROGRESS NOTES
Continued Stay Note  [facility]     Patient Name: Vita Ren  MRN: [id number]  Today's Date: 8/9/2019    Admit Date: 8/5/2019    Discharge Plan     Row Name 08/09/19 1601       Plan    Plan Comments  [facility] called Kun with [facility] @ [phone] and advised no qualifier.      Row Name 08/09/19 9131       Plan    Plan Comments  Per staff, pt not qualifying for home o2.  Louis's manager to see patient today since current patient of theirs- would need to purchase as rental if pt wants to pay privately (would need order).             Expected Discharge Date and Time     Expected Discharge Date Expected Discharge Time    Aug 9, 2019             Albert Louise RN

## 2019-08-10 ENCOUNTER — READMISSION MANAGEMENT (OUTPATIENT)
Dept: CALL CENTER | Facility: HOSPITAL | Age: 44
End: 2019-08-10

## 2019-08-10 NOTE — OUTREACH NOTE
Prep Survey      Responses   Facility patient discharged from?  [place]   Is patient eligible?  Yes   Discharge diagnosis  Acute exacerbation of asthma,  Metastatic CA to the lung,  Colorectal CA   Does the patient have one of the following disease processes/diagnoses(primary or secondary)?  Other   Does the patient have Home health ordered?  No   Is there a DME ordered?  No   What DME was ordered?  had Home O2 and nebulizer previously   Prep survey completed?  Yes          Kenia Kang RN

## 2019-08-12 ENCOUNTER — READMISSION MANAGEMENT (OUTPATIENT)
Dept: CALL CENTER | Facility: HOSPITAL | Age: 44
End: 2019-08-12

## 2019-08-12 ENCOUNTER — TELEPHONE (OUTPATIENT)
Dept: ONCOLOGY | Facility: HOSPITAL | Age: 44
End: 2019-08-12

## 2019-08-12 NOTE — PROGRESS NOTES
Continued Stay Note  [facility]     Patient Name: Vita Ren  MRN: [id number]  Today's Date: 8/12/2019    Admit Date: 8/5/2019    Discharge Plan     Row Name 08/12/19 1004       Plan    Plan Comments  Received email and voice message from RN regarding patient's home O2. States referral called to [employer] per suggestion of MARIELA  nurse. CCP contacted [employer] regarding referral. States she will f/u with patient. Per paperwork faxed patient does not have a qualifying O2 sat. However, based on information obtained when she was initially started she may be able to switch companies in order to receive portable concentrators. Says she will f/u with patient today. Candice/Liya also notified of home O2 concerns. Recommends patient contact customer service to inquire about portable concentrators. CCP called patient this am to update. Verbalized understanding.     Final Discharge Disposition Code  01 - home or self-care    Final Note  Home with family        Discharge Codes    No documentation.       Expected Discharge Date and Time     Expected Discharge Date Expected Discharge Time    Aug 9, 2019             Kimberlyn Daily RN

## 2019-08-12 NOTE — TELEPHONE ENCOUNTER
Discussed with Dr Velasquez. Bleeding rectally if bright red not from steroids. If bleeding continues go to ER. Verbalized understanding.

## 2019-08-12 NOTE — PROGRESS NOTES
Case Management Discharge Note    Final Note: Home with family    Destination      No service has been selected for the patient.      Durable Medical Equipment      No service has been selected for the patient.      Dialysis/Infusion      No service has been selected for the patient.      Home Medical Care      No service has been selected for the patient.      Therapy      No service has been selected for the patient.      Community Resources      No service has been selected for the patient.             Final Discharge Disposition Code: 01 - home or self-care

## 2019-08-12 NOTE — TELEPHONE ENCOUNTER
----- Message from Candice Harper sent at 8/12/2019  9:34 AM EDT -----  [phone]    Was d/c Friday from the hospital. Saturday she started having blood in her bm, wants to talk to someone about it.  Has bright red blood in stool and when she wipes. Has been happening every time her bowels move since Saturday. Is concerned it could be the prednisone. In basket message to Dr Velasquez.

## 2019-08-12 NOTE — OUTREACH NOTE
Medical Week 1 Survey      Responses   Facility patient discharged from?  [facility]   Does the patient have one of the following disease processes/diagnoses(primary or secondary)?  Other   Is there a successful TCM telephone encounter documented?  No   Week 1 attempt successful?  No   Unsuccessful attempts  Attempt 1          Dilcia Garg RN

## 2019-08-13 ENCOUNTER — READMISSION MANAGEMENT (OUTPATIENT)
Dept: CALL CENTER | Facility: HOSPITAL | Age: 44
End: 2019-08-13

## 2019-08-13 NOTE — OUTREACH NOTE
Medical Week 1 Survey      Responses   Facility patient discharged from?  [facility]   Does the patient have one of the following disease processes/diagnoses(primary or secondary)?  Other   Is there a successful TCM telephone encounter documented?  No   Week 1 attempt successful?  Yes   Call start time  1037   Rescheduled  Rescheduled-pt requested [Going to ER now for blood in stool-thinks may be hemorrhoids.]   Call end time  1040          Delmis Sepulveda RN

## 2019-08-14 ENCOUNTER — READMISSION MANAGEMENT (OUTPATIENT)
Dept: CALL CENTER | Facility: HOSPITAL | Age: 44
End: 2019-08-14

## 2019-08-14 NOTE — OUTREACH NOTE
Medical Week 1 Survey      Responses   Facility patient discharged from?  [facility]   Does the patient have one of the following disease processes/diagnoses(primary or secondary)?  Other   Is there a successful TCM telephone encounter documented?  No   Week 1 attempt successful?  No   Unsuccessful attempts  Attempt 1          Elicia Smith RN

## 2019-08-19 ENCOUNTER — APPOINTMENT (OUTPATIENT)
Dept: GENERAL RADIOLOGY | Facility: HOSPITAL | Age: 44
End: 2019-08-19

## 2019-08-19 ENCOUNTER — HOSPITAL ENCOUNTER (INPATIENT)
Facility: HOSPITAL | Age: 44
LOS: 9 days | Discharge: HOSPICE | End: 2019-08-28
Attending: EMERGENCY MEDICINE | Admitting: HOSPITALIST

## 2019-08-19 DIAGNOSIS — C19 COLORECTAL CANCER, STAGE IV (HCC): Primary | ICD-10-CM

## 2019-08-19 DIAGNOSIS — G89.3 PAIN OF METASTATIC MALIGNANCY: ICD-10-CM

## 2019-08-19 LAB
ALBUMIN SERPL-MCNC: 3.4 G/DL (ref 3.5–5.2)
ALBUMIN/GLOB SERPL: 1.2 G/DL
ALP SERPL-CCNC: 68 U/L (ref 39–117)
ALT SERPL W P-5'-P-CCNC: 11 U/L (ref 1–33)
ANION GAP SERPL CALCULATED.3IONS-SCNC: 9.7 MMOL/L (ref 5–15)
AST SERPL-CCNC: 18 U/L (ref 1–32)
BASOPHILS # BLD AUTO: 0.02 10*3/MM3 (ref 0–0.2)
BASOPHILS NFR BLD AUTO: 0.2 % (ref 0–1.5)
BILIRUB SERPL-MCNC: 0.2 MG/DL (ref 0.2–1.2)
BUN BLD-MCNC: 11 MG/DL (ref 6–20)
BUN/CREAT SERPL: 23.9 (ref 7–25)
CALCIUM SPEC-SCNC: 8.1 MG/DL (ref 8.6–10.5)
CHLORIDE SERPL-SCNC: 112 MMOL/L (ref 98–107)
CO2 SERPL-SCNC: 22.3 MMOL/L (ref 22–29)
CREAT BLD-MCNC: 0.46 MG/DL (ref 0.57–1)
DEPRECATED RDW RBC AUTO: 52.1 FL (ref 37–54)
EOSINOPHIL # BLD AUTO: 0.03 10*3/MM3 (ref 0–0.4)
EOSINOPHIL NFR BLD AUTO: 0.3 % (ref 0.3–6.2)
ERYTHROCYTE [DISTWIDTH] IN BLOOD BY AUTOMATED COUNT: 14.4 % (ref 12.3–15.4)
GFR SERPL CREATININE-BSD FRML MDRD: 149 ML/MIN/1.73
GLOBULIN UR ELPH-MCNC: 2.9 GM/DL
GLUCOSE BLD-MCNC: 128 MG/DL (ref 65–99)
HCT VFR BLD AUTO: 33.2 % (ref 34–46.6)
HGB BLD-MCNC: 10.6 G/DL (ref 12–15.9)
HOLD SPECIMEN: NORMAL
HOLD SPECIMEN: NORMAL
IMM GRANULOCYTES # BLD AUTO: 0.03 10*3/MM3 (ref 0–0.05)
IMM GRANULOCYTES NFR BLD AUTO: 0.3 % (ref 0–0.5)
LYMPHOCYTES # BLD AUTO: 0.19 10*3/MM3 (ref 0.7–3.1)
LYMPHOCYTES NFR BLD AUTO: 2.2 % (ref 19.6–45.3)
MCH RBC QN AUTO: 31.9 PG (ref 26.6–33)
MCHC RBC AUTO-ENTMCNC: 31.9 G/DL (ref 31.5–35.7)
MCV RBC AUTO: 100 FL (ref 79–97)
MONOCYTES # BLD AUTO: 0.26 10*3/MM3 (ref 0.1–0.9)
MONOCYTES NFR BLD AUTO: 3 % (ref 5–12)
NEUTROPHILS # BLD AUTO: 8.21 10*3/MM3 (ref 1.7–7)
NEUTROPHILS NFR BLD AUTO: 94 % (ref 42.7–76)
NRBC BLD AUTO-RTO: 0 /100 WBC (ref 0–0.2)
PLATELET # BLD AUTO: 184 10*3/MM3 (ref 140–450)
PMV BLD AUTO: 9.3 FL (ref 6–12)
POTASSIUM BLD-SCNC: 3.5 MMOL/L (ref 3.5–5.2)
PROT SERPL-MCNC: 6.3 G/DL (ref 6–8.5)
RBC # BLD AUTO: 3.32 10*6/MM3 (ref 3.77–5.28)
SODIUM BLD-SCNC: 144 MMOL/L (ref 136–145)
WBC NRBC COR # BLD: 8.74 10*3/MM3 (ref 3.4–10.8)
WHOLE BLOOD HOLD SPECIMEN: NORMAL
WHOLE BLOOD HOLD SPECIMEN: NORMAL

## 2019-08-19 PROCEDURE — 85025 COMPLETE CBC W/AUTO DIFF WBC: CPT | Performed by: EMERGENCY MEDICINE

## 2019-08-19 PROCEDURE — 72110 X-RAY EXAM L-2 SPINE 4/>VWS: CPT

## 2019-08-19 PROCEDURE — 80053 COMPREHEN METABOLIC PANEL: CPT | Performed by: EMERGENCY MEDICINE

## 2019-08-19 PROCEDURE — 71045 X-RAY EXAM CHEST 1 VIEW: CPT

## 2019-08-19 PROCEDURE — 99284 EMERGENCY DEPT VISIT MOD MDM: CPT

## 2019-08-19 PROCEDURE — 25010000002 ONDANSETRON PER 1 MG: Performed by: EMERGENCY MEDICINE

## 2019-08-19 PROCEDURE — 25010000002 HYDROMORPHONE 1 MG/ML SOLUTION: Performed by: EMERGENCY MEDICINE

## 2019-08-19 RX ORDER — ONDANSETRON 2 MG/ML
4 INJECTION INTRAMUSCULAR; INTRAVENOUS ONCE
Status: COMPLETED | OUTPATIENT
Start: 2019-08-19 | End: 2019-08-19

## 2019-08-19 RX ADMIN — HYDROMORPHONE HYDROCHLORIDE 1 MG: 1 INJECTION, SOLUTION INTRAMUSCULAR; INTRAVENOUS; SUBCUTANEOUS at 21:21

## 2019-08-19 RX ADMIN — HYDROMORPHONE HYDROCHLORIDE 1 MG: 1 INJECTION, SOLUTION INTRAMUSCULAR; INTRAVENOUS; SUBCUTANEOUS at 22:40

## 2019-08-19 RX ADMIN — SODIUM CHLORIDE 1000 ML: 9 INJECTION, SOLUTION INTRAVENOUS at 21:06

## 2019-08-19 RX ADMIN — HYDROMORPHONE HYDROCHLORIDE 1 MG: 1 INJECTION, SOLUTION INTRAMUSCULAR; INTRAVENOUS; SUBCUTANEOUS at 21:02

## 2019-08-19 RX ADMIN — ONDANSETRON 4 MG: 2 INJECTION INTRAMUSCULAR; INTRAVENOUS at 21:00

## 2019-08-20 PROCEDURE — 63710000001 PREDNISONE PER 1 MG: Performed by: HOSPITALIST

## 2019-08-20 PROCEDURE — 99223 1ST HOSP IP/OBS HIGH 75: CPT | Performed by: INTERNAL MEDICINE

## 2019-08-20 PROCEDURE — 94640 AIRWAY INHALATION TREATMENT: CPT

## 2019-08-20 PROCEDURE — 94799 UNLISTED PULMONARY SVC/PX: CPT

## 2019-08-20 PROCEDURE — 25010000002 LORAZEPAM PER 2 MG: Performed by: INTERNAL MEDICINE

## 2019-08-20 RX ORDER — PREDNISONE 20 MG/1
20 TABLET ORAL
Status: DISCONTINUED | OUTPATIENT
Start: 2019-08-20 | End: 2019-08-28 | Stop reason: HOSPADM

## 2019-08-20 RX ORDER — HYDROMORPHONE HYDROCHLORIDE 1 MG/ML
0.5 INJECTION, SOLUTION INTRAMUSCULAR; INTRAVENOUS; SUBCUTANEOUS EVERY 4 HOURS PRN
Status: DISCONTINUED | OUTPATIENT
Start: 2019-08-20 | End: 2019-08-20

## 2019-08-20 RX ORDER — SODIUM CHLORIDE 0.9 % (FLUSH) 0.9 %
10 SYRINGE (ML) INJECTION AS NEEDED
Status: DISCONTINUED | OUTPATIENT
Start: 2019-08-20 | End: 2019-08-28 | Stop reason: HOSPADM

## 2019-08-20 RX ORDER — DOCUSATE SODIUM 100 MG/1
100 CAPSULE, LIQUID FILLED ORAL 2 TIMES DAILY
Status: DISCONTINUED | OUTPATIENT
Start: 2019-08-20 | End: 2019-08-27

## 2019-08-20 RX ORDER — MIRTAZAPINE 15 MG/1
15 TABLET, FILM COATED ORAL NIGHTLY
Status: DISCONTINUED | OUTPATIENT
Start: 2019-08-20 | End: 2019-08-28 | Stop reason: HOSPADM

## 2019-08-20 RX ORDER — CETIRIZINE HYDROCHLORIDE 10 MG/1
10 TABLET ORAL DAILY
Status: DISCONTINUED | OUTPATIENT
Start: 2019-08-20 | End: 2019-08-28 | Stop reason: HOSPADM

## 2019-08-20 RX ORDER — HYDROMORPHONE HYDROCHLORIDE 1 MG/ML
1 INJECTION, SOLUTION INTRAMUSCULAR; INTRAVENOUS; SUBCUTANEOUS ONCE
Status: DISCONTINUED | OUTPATIENT
Start: 2019-08-20 | End: 2019-08-20

## 2019-08-20 RX ORDER — ONDANSETRON 2 MG/ML
4 INJECTION INTRAMUSCULAR; INTRAVENOUS EVERY 6 HOURS PRN
Status: DISCONTINUED | OUTPATIENT
Start: 2019-08-20 | End: 2019-08-28 | Stop reason: HOSPADM

## 2019-08-20 RX ORDER — LORAZEPAM 0.5 MG/1
0.5 TABLET ORAL EVERY 6 HOURS PRN
Status: DISCONTINUED | OUTPATIENT
Start: 2019-08-20 | End: 2019-08-23

## 2019-08-20 RX ORDER — IPRATROPIUM BROMIDE AND ALBUTEROL SULFATE 2.5; .5 MG/3ML; MG/3ML
3 SOLUTION RESPIRATORY (INHALATION) EVERY 4 HOURS PRN
Status: DISCONTINUED | OUTPATIENT
Start: 2019-08-20 | End: 2019-08-22

## 2019-08-20 RX ORDER — MORPHINE SULFATE 2 MG/ML
1 INJECTION, SOLUTION INTRAMUSCULAR; INTRAVENOUS
Status: DISCONTINUED | OUTPATIENT
Start: 2019-08-20 | End: 2019-08-27

## 2019-08-20 RX ORDER — SODIUM CHLORIDE 0.9 % (FLUSH) 0.9 %
20 SYRINGE (ML) INJECTION AS NEEDED
Status: DISCONTINUED | OUTPATIENT
Start: 2019-08-20 | End: 2019-08-28 | Stop reason: HOSPADM

## 2019-08-20 RX ORDER — ONDANSETRON 4 MG/1
8 TABLET, FILM COATED ORAL EVERY 8 HOURS PRN
Status: DISCONTINUED | OUTPATIENT
Start: 2019-08-20 | End: 2019-08-28 | Stop reason: HOSPADM

## 2019-08-20 RX ORDER — MORPHINE SULFATE 20 MG/ML
10 SOLUTION ORAL EVERY 4 HOURS PRN
Status: DISCONTINUED | OUTPATIENT
Start: 2019-08-20 | End: 2019-08-20

## 2019-08-20 RX ORDER — MELATONIN
1000 DAILY
Status: DISCONTINUED | OUTPATIENT
Start: 2019-08-20 | End: 2019-08-28 | Stop reason: HOSPADM

## 2019-08-20 RX ORDER — SODIUM CHLORIDE 0.9 % (FLUSH) 0.9 %
10 SYRINGE (ML) INJECTION EVERY 12 HOURS SCHEDULED
Status: DISCONTINUED | OUTPATIENT
Start: 2019-08-20 | End: 2019-08-20

## 2019-08-20 RX ORDER — GABAPENTIN 100 MG/1
100 CAPSULE ORAL EVERY 12 HOURS SCHEDULED
Status: DISCONTINUED | OUTPATIENT
Start: 2019-08-20 | End: 2019-08-28 | Stop reason: HOSPADM

## 2019-08-20 RX ORDER — SENNOSIDES 8.6 MG
1 TABLET ORAL 2 TIMES DAILY
Status: DISCONTINUED | OUTPATIENT
Start: 2019-08-20 | End: 2019-08-21

## 2019-08-20 RX ORDER — LORAZEPAM 2 MG/ML
1 INJECTION INTRAMUSCULAR EVERY 4 HOURS PRN
Status: DISCONTINUED | OUTPATIENT
Start: 2019-08-20 | End: 2019-08-20

## 2019-08-20 RX ORDER — MORPHINE SULFATE 15 MG/1
15 TABLET, FILM COATED, EXTENDED RELEASE ORAL EVERY 12 HOURS SCHEDULED
Status: DISCONTINUED | OUTPATIENT
Start: 2019-08-20 | End: 2019-08-21

## 2019-08-20 RX ORDER — MORPHINE SULFATE 15 MG/1
15 TABLET, FILM COATED, EXTENDED RELEASE ORAL 2 TIMES DAILY
COMMUNITY
End: 2019-08-28 | Stop reason: HOSPADM

## 2019-08-20 RX ORDER — BUDESONIDE AND FORMOTEROL FUMARATE DIHYDRATE 160; 4.5 UG/1; UG/1
2 AEROSOL RESPIRATORY (INHALATION)
Status: DISCONTINUED | OUTPATIENT
Start: 2019-08-20 | End: 2019-08-28 | Stop reason: HOSPADM

## 2019-08-20 RX ORDER — DOCUSATE SODIUM 100 MG/1
100 CAPSULE, LIQUID FILLED ORAL DAILY
Status: DISCONTINUED | OUTPATIENT
Start: 2019-08-20 | End: 2019-08-20

## 2019-08-20 RX ADMIN — MIRTAZAPINE 15 MG: 15 TABLET, FILM COATED ORAL at 20:07

## 2019-08-20 RX ADMIN — IPRATROPIUM BROMIDE AND ALBUTEROL SULFATE 3 ML: 2.5; .5 SOLUTION RESPIRATORY (INHALATION) at 07:31

## 2019-08-20 RX ADMIN — CETIRIZINE HYDROCHLORIDE 10 MG: 10 TABLET, FILM COATED ORAL at 08:47

## 2019-08-20 RX ADMIN — DOCUSATE SODIUM 100 MG: 100 CAPSULE, LIQUID FILLED ORAL at 22:43

## 2019-08-20 RX ADMIN — SODIUM CHLORIDE, PRESERVATIVE FREE 10 ML: 5 INJECTION INTRAVENOUS at 08:48

## 2019-08-20 RX ADMIN — IPRATROPIUM BROMIDE AND ALBUTEROL SULFATE 3 ML: 2.5; .5 SOLUTION RESPIRATORY (INHALATION) at 00:58

## 2019-08-20 RX ADMIN — SENNA 8.6 MG: 8.6 TABLET, COATED ORAL at 08:47

## 2019-08-20 RX ADMIN — IPRATROPIUM BROMIDE AND ALBUTEROL SULFATE 3 ML: 2.5; .5 SOLUTION RESPIRATORY (INHALATION) at 19:17

## 2019-08-20 RX ADMIN — IPRATROPIUM BROMIDE AND ALBUTEROL SULFATE 3 ML: 2.5; .5 SOLUTION RESPIRATORY (INHALATION) at 13:49

## 2019-08-20 RX ADMIN — GABAPENTIN 100 MG: 100 CAPSULE ORAL at 20:07

## 2019-08-20 RX ADMIN — GABAPENTIN 100 MG: 100 CAPSULE ORAL at 07:47

## 2019-08-20 RX ADMIN — IPRATROPIUM BROMIDE AND ALBUTEROL SULFATE 3 ML: 2.5; .5 SOLUTION RESPIRATORY (INHALATION) at 23:27

## 2019-08-20 RX ADMIN — MORPHINE SULFATE 15 MG: 15 TABLET, FILM COATED, EXTENDED RELEASE ORAL at 20:07

## 2019-08-20 RX ADMIN — VITAMIN D, TAB 1000IU (100/BT) 1000 UNITS: 25 TAB at 08:47

## 2019-08-20 RX ADMIN — PREDNISONE 20 MG: 20 TABLET ORAL at 14:31

## 2019-08-20 RX ADMIN — LORAZEPAM 0.5 MG: 0.5 TABLET ORAL at 20:29

## 2019-08-20 RX ADMIN — SENNA 8.6 MG: 8.6 TABLET, COATED ORAL at 20:07

## 2019-08-20 RX ADMIN — DOCUSATE SODIUM 100 MG: 100 CAPSULE, LIQUID FILLED ORAL at 08:47

## 2019-08-20 RX ADMIN — MORPHINE SULFATE 15 MG: 15 TABLET, FILM COATED, EXTENDED RELEASE ORAL at 07:47

## 2019-08-20 RX ADMIN — LORAZEPAM 1 MG: 2 INJECTION, SOLUTION INTRAMUSCULAR; INTRAVENOUS at 10:28

## 2019-08-21 LAB
ALBUMIN SERPL-MCNC: 3.9 G/DL (ref 3.5–5.2)
ALBUMIN/GLOB SERPL: 1.4 G/DL
ALP SERPL-CCNC: 69 U/L (ref 39–117)
ALT SERPL W P-5'-P-CCNC: 8 U/L (ref 1–33)
ANION GAP SERPL CALCULATED.3IONS-SCNC: 8.9 MMOL/L (ref 5–15)
AST SERPL-CCNC: 15 U/L (ref 1–32)
BASOPHILS # BLD AUTO: 0.02 10*3/MM3 (ref 0–0.2)
BASOPHILS NFR BLD AUTO: 0.2 % (ref 0–1.5)
BILIRUB SERPL-MCNC: 0.2 MG/DL (ref 0.2–1.2)
BUN BLD-MCNC: 10 MG/DL (ref 6–20)
BUN/CREAT SERPL: 24.4 (ref 7–25)
CALCIUM SPEC-SCNC: 8.6 MG/DL (ref 8.6–10.5)
CHLORIDE SERPL-SCNC: 105 MMOL/L (ref 98–107)
CHOLEST SERPL-MCNC: 141 MG/DL (ref 0–200)
CO2 SERPL-SCNC: 25.1 MMOL/L (ref 22–29)
CREAT BLD-MCNC: 0.41 MG/DL (ref 0.57–1)
DEPRECATED RDW RBC AUTO: 50 FL (ref 37–54)
EOSINOPHIL # BLD AUTO: 0.02 10*3/MM3 (ref 0–0.4)
EOSINOPHIL NFR BLD AUTO: 0.2 % (ref 0.3–6.2)
ERYTHROCYTE [DISTWIDTH] IN BLOOD BY AUTOMATED COUNT: 14.1 % (ref 12.3–15.4)
GFR SERPL CREATININE-BSD FRML MDRD: >150 ML/MIN/1.73
GLOBULIN UR ELPH-MCNC: 2.8 GM/DL
GLUCOSE BLD-MCNC: 96 MG/DL (ref 65–99)
HBA1C MFR BLD: 4.9 % (ref 4.8–5.6)
HCT VFR BLD AUTO: 34.7 % (ref 34–46.6)
HDLC SERPL-MCNC: 53 MG/DL (ref 40–60)
HGB BLD-MCNC: 11.2 G/DL (ref 12–15.9)
IMM GRANULOCYTES # BLD AUTO: 0.04 10*3/MM3 (ref 0–0.05)
IMM GRANULOCYTES NFR BLD AUTO: 0.4 % (ref 0–0.5)
LDLC SERPL CALC-MCNC: 76 MG/DL (ref 0–100)
LDLC/HDLC SERPL: 1.43 {RATIO}
LYMPHOCYTES # BLD AUTO: 1.16 10*3/MM3 (ref 0.7–3.1)
LYMPHOCYTES NFR BLD AUTO: 11.8 % (ref 19.6–45.3)
MCH RBC QN AUTO: 32.1 PG (ref 26.6–33)
MCHC RBC AUTO-ENTMCNC: 32.3 G/DL (ref 31.5–35.7)
MCV RBC AUTO: 99.4 FL (ref 79–97)
MONOCYTES # BLD AUTO: 0.87 10*3/MM3 (ref 0.1–0.9)
MONOCYTES NFR BLD AUTO: 8.9 % (ref 5–12)
NEUTROPHILS # BLD AUTO: 7.7 10*3/MM3 (ref 1.7–7)
NEUTROPHILS NFR BLD AUTO: 78.5 % (ref 42.7–76)
NRBC BLD AUTO-RTO: 0 /100 WBC (ref 0–0.2)
NT-PROBNP SERPL-MCNC: 246.1 PG/ML (ref 5–450)
PLATELET # BLD AUTO: 219 10*3/MM3 (ref 140–450)
PMV BLD AUTO: 9.1 FL (ref 6–12)
POTASSIUM BLD-SCNC: 3.9 MMOL/L (ref 3.5–5.2)
PROT SERPL-MCNC: 6.7 G/DL (ref 6–8.5)
RBC # BLD AUTO: 3.49 10*6/MM3 (ref 3.77–5.28)
SODIUM BLD-SCNC: 139 MMOL/L (ref 136–145)
TRIGL SERPL-MCNC: 61 MG/DL (ref 0–150)
TSH SERPL DL<=0.05 MIU/L-ACNC: 1.25 MIU/ML (ref 0.27–4.2)
VLDLC SERPL-MCNC: 12.2 MG/DL (ref 5–40)
WBC NRBC COR # BLD: 9.81 10*3/MM3 (ref 3.4–10.8)

## 2019-08-21 PROCEDURE — 84443 ASSAY THYROID STIM HORMONE: CPT | Performed by: HOSPITALIST

## 2019-08-21 PROCEDURE — 25010000002 MORPHINE PER 10 MG: Performed by: INTERNAL MEDICINE

## 2019-08-21 PROCEDURE — 94640 AIRWAY INHALATION TREATMENT: CPT

## 2019-08-21 PROCEDURE — 80061 LIPID PANEL: CPT | Performed by: HOSPITALIST

## 2019-08-21 PROCEDURE — 83880 ASSAY OF NATRIURETIC PEPTIDE: CPT | Performed by: HOSPITALIST

## 2019-08-21 PROCEDURE — 80053 COMPREHEN METABOLIC PANEL: CPT | Performed by: HOSPITALIST

## 2019-08-21 PROCEDURE — 99232 SBSQ HOSP IP/OBS MODERATE 35: CPT | Performed by: INTERNAL MEDICINE

## 2019-08-21 PROCEDURE — 25810000003 SODIUM CHLORIDE 0.9 % WITH KCL 20 MEQ 20-0.9 MEQ/L-% SOLUTION: Performed by: INTERNAL MEDICINE

## 2019-08-21 PROCEDURE — 94799 UNLISTED PULMONARY SVC/PX: CPT

## 2019-08-21 PROCEDURE — 63710000001 PREDNISONE PER 1 MG: Performed by: HOSPITALIST

## 2019-08-21 PROCEDURE — 83036 HEMOGLOBIN GLYCOSYLATED A1C: CPT | Performed by: HOSPITALIST

## 2019-08-21 PROCEDURE — 85025 COMPLETE CBC W/AUTO DIFF WBC: CPT | Performed by: HOSPITALIST

## 2019-08-21 RX ORDER — SODIUM CHLORIDE AND POTASSIUM CHLORIDE 150; 900 MG/100ML; MG/100ML
75 INJECTION, SOLUTION INTRAVENOUS CONTINUOUS
Status: DISCONTINUED | OUTPATIENT
Start: 2019-08-21 | End: 2019-08-22

## 2019-08-21 RX ORDER — BISACODYL 10 MG
10 SUPPOSITORY, RECTAL RECTAL DAILY
Status: DISCONTINUED | OUTPATIENT
Start: 2019-08-21 | End: 2019-08-28 | Stop reason: HOSPADM

## 2019-08-21 RX ORDER — MORPHINE SULFATE 20 MG/ML
20 SOLUTION ORAL
Status: DISCONTINUED | OUTPATIENT
Start: 2019-08-21 | End: 2019-08-26

## 2019-08-21 RX ORDER — MORPHINE SULFATE 2 MG/ML
4 INJECTION, SOLUTION INTRAMUSCULAR; INTRAVENOUS
Status: DISCONTINUED | OUTPATIENT
Start: 2019-08-21 | End: 2019-08-27

## 2019-08-21 RX ORDER — LORAZEPAM 2 MG/ML
1 INJECTION INTRAMUSCULAR EVERY 6 HOURS PRN
Status: DISCONTINUED | OUTPATIENT
Start: 2019-08-21 | End: 2019-08-23

## 2019-08-21 RX ORDER — MORPHINE SULFATE 2 MG/ML
2 INJECTION, SOLUTION INTRAMUSCULAR; INTRAVENOUS
Status: DISCONTINUED | OUTPATIENT
Start: 2019-08-21 | End: 2019-08-27

## 2019-08-21 RX ORDER — MORPHINE SULFATE 30 MG/1
30 TABLET, FILM COATED, EXTENDED RELEASE ORAL EVERY 12 HOURS SCHEDULED
Status: DISCONTINUED | OUTPATIENT
Start: 2019-08-22 | End: 2019-08-27

## 2019-08-21 RX ORDER — SENNOSIDES 8.6 MG
2 TABLET ORAL 2 TIMES DAILY
Status: DISCONTINUED | OUTPATIENT
Start: 2019-08-21 | End: 2019-08-27

## 2019-08-21 RX ADMIN — LORAZEPAM 0.5 MG: 0.5 TABLET ORAL at 16:17

## 2019-08-21 RX ADMIN — MORPHINE SULFATE 15 MG: 15 TABLET, FILM COATED, EXTENDED RELEASE ORAL at 19:00

## 2019-08-21 RX ADMIN — IPRATROPIUM BROMIDE AND ALBUTEROL SULFATE 3 ML: 2.5; .5 SOLUTION RESPIRATORY (INHALATION) at 14:36

## 2019-08-21 RX ADMIN — POLYETHYLENE GLYCOL 3350 17 G: 17 POWDER, FOR SOLUTION ORAL at 11:17

## 2019-08-21 RX ADMIN — VITAMIN D, TAB 1000IU (100/BT) 1000 UNITS: 25 TAB at 08:04

## 2019-08-21 RX ADMIN — MORPHINE SULFATE 15 MG: 15 TABLET, FILM COATED, EXTENDED RELEASE ORAL at 07:44

## 2019-08-21 RX ADMIN — GABAPENTIN 100 MG: 100 CAPSULE ORAL at 19:38

## 2019-08-21 RX ADMIN — DOCUSATE SODIUM 100 MG: 100 CAPSULE, LIQUID FILLED ORAL at 21:51

## 2019-08-21 RX ADMIN — MORPHINE SULFATE 2 MG: 2 INJECTION, SOLUTION INTRAMUSCULAR; INTRAVENOUS at 21:46

## 2019-08-21 RX ADMIN — CETIRIZINE HYDROCHLORIDE 10 MG: 10 TABLET, FILM COATED ORAL at 08:04

## 2019-08-21 RX ADMIN — GABAPENTIN 100 MG: 100 CAPSULE ORAL at 08:13

## 2019-08-21 RX ADMIN — MORPHINE SULFATE 1 MG: 2 INJECTION, SOLUTION INTRAMUSCULAR; INTRAVENOUS at 19:00

## 2019-08-21 RX ADMIN — MORPHINE SULFATE 1 MG: 2 INJECTION, SOLUTION INTRAMUSCULAR; INTRAVENOUS at 07:44

## 2019-08-21 RX ADMIN — SENNA 8.6 MG: 8.6 TABLET, COATED ORAL at 08:04

## 2019-08-21 RX ADMIN — PREDNISONE 20 MG: 20 TABLET ORAL at 08:04

## 2019-08-21 RX ADMIN — LORAZEPAM 0.5 MG: 0.5 TABLET ORAL at 23:32

## 2019-08-21 RX ADMIN — DOCUSATE SODIUM 100 MG: 100 CAPSULE, LIQUID FILLED ORAL at 08:04

## 2019-08-21 RX ADMIN — IPRATROPIUM BROMIDE AND ALBUTEROL SULFATE 3 ML: 2.5; .5 SOLUTION RESPIRATORY (INHALATION) at 23:11

## 2019-08-21 RX ADMIN — MORPHINE SULFATE 1 MG: 2 INJECTION, SOLUTION INTRAMUSCULAR; INTRAVENOUS at 09:45

## 2019-08-21 RX ADMIN — LORAZEPAM 0.5 MG: 0.5 TABLET ORAL at 07:44

## 2019-08-21 RX ADMIN — POTASSIUM CHLORIDE AND SODIUM CHLORIDE 75 ML/HR: 900; 150 INJECTION, SOLUTION INTRAVENOUS at 12:16

## 2019-08-21 RX ADMIN — SENNOSIDES 17.2 MG: 8.6 TABLET, FILM COATED ORAL at 21:46

## 2019-08-21 RX ADMIN — IPRATROPIUM BROMIDE AND ALBUTEROL SULFATE 3 ML: 2.5; .5 SOLUTION RESPIRATORY (INHALATION) at 19:12

## 2019-08-22 PROCEDURE — 63710000001 PREDNISONE PER 1 MG: Performed by: HOSPITALIST

## 2019-08-22 PROCEDURE — 94799 UNLISTED PULMONARY SVC/PX: CPT

## 2019-08-22 PROCEDURE — 25810000003 SODIUM CHLORIDE 0.9 % WITH KCL 20 MEQ 20-0.9 MEQ/L-% SOLUTION: Performed by: HOSPITALIST

## 2019-08-22 PROCEDURE — 25810000003 SODIUM CHLORIDE 0.9 % WITH KCL 20 MEQ 20-0.9 MEQ/L-% SOLUTION: Performed by: INTERNAL MEDICINE

## 2019-08-22 PROCEDURE — 99231 SBSQ HOSP IP/OBS SF/LOW 25: CPT | Performed by: INTERNAL MEDICINE

## 2019-08-22 PROCEDURE — 25010000002 MORPHINE PER 10 MG: Performed by: INTERNAL MEDICINE

## 2019-08-22 RX ORDER — SODIUM CHLORIDE AND POTASSIUM CHLORIDE 150; 900 MG/100ML; MG/100ML
75 INJECTION, SOLUTION INTRAVENOUS NIGHTLY
Status: DISCONTINUED | OUTPATIENT
Start: 2019-08-22 | End: 2019-08-28 | Stop reason: HOSPADM

## 2019-08-22 RX ORDER — IPRATROPIUM BROMIDE AND ALBUTEROL SULFATE 2.5; .5 MG/3ML; MG/3ML
3 SOLUTION RESPIRATORY (INHALATION)
Status: DISCONTINUED | OUTPATIENT
Start: 2019-08-22 | End: 2019-08-28 | Stop reason: HOSPADM

## 2019-08-22 RX ORDER — LIDOCAINE 50 MG/G
1 PATCH TOPICAL
Status: DISCONTINUED | OUTPATIENT
Start: 2019-08-23 | End: 2019-08-27

## 2019-08-22 RX ADMIN — IPRATROPIUM BROMIDE AND ALBUTEROL SULFATE 3 ML: 2.5; .5 SOLUTION RESPIRATORY (INHALATION) at 11:14

## 2019-08-22 RX ADMIN — MORPHINE SULFATE 30 MG: 30 TABLET, EXTENDED RELEASE ORAL at 07:46

## 2019-08-22 RX ADMIN — SENNOSIDES 17.2 MG: 8.6 TABLET, FILM COATED ORAL at 08:04

## 2019-08-22 RX ADMIN — DOCUSATE SODIUM 100 MG: 100 CAPSULE, LIQUID FILLED ORAL at 08:04

## 2019-08-22 RX ADMIN — LORAZEPAM 0.5 MG: 0.5 TABLET ORAL at 19:04

## 2019-08-22 RX ADMIN — POTASSIUM CHLORIDE AND SODIUM CHLORIDE 75 ML/HR: 900; 150 INJECTION, SOLUTION INTRAVENOUS at 21:38

## 2019-08-22 RX ADMIN — POTASSIUM CHLORIDE AND SODIUM CHLORIDE 75 ML/HR: 900; 150 INJECTION, SOLUTION INTRAVENOUS at 01:35

## 2019-08-22 RX ADMIN — LORAZEPAM 0.5 MG: 0.5 TABLET ORAL at 12:44

## 2019-08-22 RX ADMIN — MIRTAZAPINE 15 MG: 15 TABLET, FILM COATED ORAL at 22:52

## 2019-08-22 RX ADMIN — MORPHINE SULFATE 30 MG: 30 TABLET, EXTENDED RELEASE ORAL at 19:04

## 2019-08-22 RX ADMIN — MORPHINE SULFATE 20 MG: 100 SOLUTION ORAL at 03:45

## 2019-08-22 RX ADMIN — DOCUSATE SODIUM 100 MG: 100 CAPSULE, LIQUID FILLED ORAL at 21:38

## 2019-08-22 RX ADMIN — MIRTAZAPINE 15 MG: 15 TABLET, FILM COATED ORAL at 00:31

## 2019-08-22 RX ADMIN — GABAPENTIN 100 MG: 100 CAPSULE ORAL at 19:04

## 2019-08-22 RX ADMIN — PREDNISONE 20 MG: 20 TABLET ORAL at 08:04

## 2019-08-22 RX ADMIN — MORPHINE SULFATE 2 MG: 2 INJECTION, SOLUTION INTRAMUSCULAR; INTRAVENOUS at 19:50

## 2019-08-22 RX ADMIN — IPRATROPIUM BROMIDE AND ALBUTEROL SULFATE 3 ML: 2.5; .5 SOLUTION RESPIRATORY (INHALATION) at 03:17

## 2019-08-22 RX ADMIN — CETIRIZINE HYDROCHLORIDE 10 MG: 10 TABLET, FILM COATED ORAL at 08:04

## 2019-08-22 RX ADMIN — IPRATROPIUM BROMIDE AND ALBUTEROL SULFATE 3 ML: 2.5; .5 SOLUTION RESPIRATORY (INHALATION) at 20:23

## 2019-08-22 RX ADMIN — SENNOSIDES 17.2 MG: 8.6 TABLET, FILM COATED ORAL at 21:38

## 2019-08-22 RX ADMIN — VITAMIN D, TAB 1000IU (100/BT) 1000 UNITS: 25 TAB at 08:04

## 2019-08-22 RX ADMIN — MORPHINE SULFATE 2 MG: 2 INJECTION, SOLUTION INTRAMUSCULAR; INTRAVENOUS at 02:40

## 2019-08-22 RX ADMIN — IPRATROPIUM BROMIDE AND ALBUTEROL SULFATE 3 ML: 2.5; .5 SOLUTION RESPIRATORY (INHALATION) at 16:12

## 2019-08-22 RX ADMIN — GABAPENTIN 100 MG: 100 CAPSULE ORAL at 07:46

## 2019-08-23 ENCOUNTER — APPOINTMENT (OUTPATIENT)
Dept: CT IMAGING | Facility: HOSPITAL | Age: 44
End: 2019-08-23

## 2019-08-23 PROCEDURE — 63710000001 PREDNISONE PER 1 MG: Performed by: HOSPITALIST

## 2019-08-23 PROCEDURE — 71260 CT THORAX DX C+: CPT

## 2019-08-23 PROCEDURE — 94799 UNLISTED PULMONARY SVC/PX: CPT

## 2019-08-23 PROCEDURE — 74177 CT ABD & PELVIS W/CONTRAST: CPT

## 2019-08-23 PROCEDURE — 25010000002 IOPAMIDOL 61 % SOLUTION: Performed by: HOSPITALIST

## 2019-08-23 PROCEDURE — 99233 SBSQ HOSP IP/OBS HIGH 50: CPT | Performed by: INTERNAL MEDICINE

## 2019-08-23 PROCEDURE — 25810000003 SODIUM CHLORIDE 0.9 % WITH KCL 20 MEQ 20-0.9 MEQ/L-% SOLUTION: Performed by: HOSPITALIST

## 2019-08-23 PROCEDURE — 0 DIATRIZOATE MEGLUMINE & SODIUM PER 1 ML: Performed by: HOSPITALIST

## 2019-08-23 PROCEDURE — 25010000002 ONDANSETRON PER 1 MG: Performed by: HOSPITALIST

## 2019-08-23 PROCEDURE — 25010000002 MORPHINE PER 10 MG: Performed by: INTERNAL MEDICINE

## 2019-08-23 RX ORDER — LUBIPROSTONE 8 UG/1
8 CAPSULE ORAL
Status: DISCONTINUED | OUTPATIENT
Start: 2019-08-23 | End: 2019-08-27

## 2019-08-23 RX ORDER — PROCHLORPERAZINE MALEATE 10 MG
10 TABLET ORAL EVERY 6 HOURS PRN
Status: DISCONTINUED | OUTPATIENT
Start: 2019-08-23 | End: 2019-08-28 | Stop reason: HOSPADM

## 2019-08-23 RX ORDER — LUBIPROSTONE 8 UG/1
8 CAPSULE ORAL
Status: DISCONTINUED | OUTPATIENT
Start: 2019-08-24 | End: 2019-08-23

## 2019-08-23 RX ORDER — LORAZEPAM 2 MG/ML
1 INJECTION INTRAMUSCULAR
Status: DISCONTINUED | OUTPATIENT
Start: 2019-08-23 | End: 2019-08-26

## 2019-08-23 RX ORDER — LUBIPROSTONE 24 UG/1
24 CAPSULE ORAL
Status: DISCONTINUED | OUTPATIENT
Start: 2019-08-23 | End: 2019-08-23

## 2019-08-23 RX ORDER — LORAZEPAM 0.5 MG/1
0.5 TABLET ORAL
Status: DISCONTINUED | OUTPATIENT
Start: 2019-08-23 | End: 2019-08-26

## 2019-08-23 RX ADMIN — MORPHINE SULFATE 30 MG: 30 TABLET, EXTENDED RELEASE ORAL at 18:33

## 2019-08-23 RX ADMIN — MORPHINE SULFATE 2 MG: 2 INJECTION, SOLUTION INTRAMUSCULAR; INTRAVENOUS at 12:37

## 2019-08-23 RX ADMIN — CETIRIZINE HYDROCHLORIDE 10 MG: 10 TABLET, FILM COATED ORAL at 08:59

## 2019-08-23 RX ADMIN — LIDOCAINE 1 PATCH: 50 PATCH TOPICAL at 18:37

## 2019-08-23 RX ADMIN — POTASSIUM CHLORIDE AND SODIUM CHLORIDE 75 ML/HR: 900; 150 INJECTION, SOLUTION INTRAVENOUS at 22:21

## 2019-08-23 RX ADMIN — MORPHINE SULFATE 30 MG: 30 TABLET, EXTENDED RELEASE ORAL at 06:12

## 2019-08-23 RX ADMIN — SENNOSIDES 17.2 MG: 8.6 TABLET, FILM COATED ORAL at 20:35

## 2019-08-23 RX ADMIN — ONDANSETRON 4 MG: 2 INJECTION INTRAMUSCULAR; INTRAVENOUS at 06:20

## 2019-08-23 RX ADMIN — MORPHINE SULFATE 2 MG: 2 INJECTION, SOLUTION INTRAMUSCULAR; INTRAVENOUS at 08:31

## 2019-08-23 RX ADMIN — MORPHINE SULFATE 2 MG: 2 INJECTION, SOLUTION INTRAMUSCULAR; INTRAVENOUS at 06:12

## 2019-08-23 RX ADMIN — IOPAMIDOL 88 ML: 612 INJECTION, SOLUTION INTRAVENOUS at 16:15

## 2019-08-23 RX ADMIN — DOCUSATE SODIUM 100 MG: 100 CAPSULE, LIQUID FILLED ORAL at 20:37

## 2019-08-23 RX ADMIN — IPRATROPIUM BROMIDE AND ALBUTEROL SULFATE 3 ML: 2.5; .5 SOLUTION RESPIRATORY (INHALATION) at 07:27

## 2019-08-23 RX ADMIN — VITAMIN D, TAB 1000IU (100/BT) 1000 UNITS: 25 TAB at 08:59

## 2019-08-23 RX ADMIN — MORPHINE SULFATE 2 MG: 2 INJECTION, SOLUTION INTRAMUSCULAR; INTRAVENOUS at 20:35

## 2019-08-23 RX ADMIN — GABAPENTIN 100 MG: 100 CAPSULE ORAL at 06:12

## 2019-08-23 RX ADMIN — LORAZEPAM 0.5 MG: 0.5 TABLET ORAL at 22:09

## 2019-08-23 RX ADMIN — LORAZEPAM 0.5 MG: 0.5 TABLET ORAL at 07:23

## 2019-08-23 RX ADMIN — IPRATROPIUM BROMIDE AND ALBUTEROL SULFATE 3 ML: 2.5; .5 SOLUTION RESPIRATORY (INHALATION) at 11:26

## 2019-08-23 RX ADMIN — SENNOSIDES 17.2 MG: 8.6 TABLET, FILM COATED ORAL at 08:58

## 2019-08-23 RX ADMIN — MIRTAZAPINE 15 MG: 15 TABLET, FILM COATED ORAL at 20:35

## 2019-08-23 RX ADMIN — LORAZEPAM 0.5 MG: 0.5 TABLET ORAL at 18:34

## 2019-08-23 RX ADMIN — IPRATROPIUM BROMIDE AND ALBUTEROL SULFATE 3 ML: 2.5; .5 SOLUTION RESPIRATORY (INHALATION) at 15:27

## 2019-08-23 RX ADMIN — DIATRIZOATE MEGLUMINE AND DIATRIZOATE SODIUM 30 ML: 600; 100 SOLUTION ORAL; RECTAL at 11:19

## 2019-08-23 RX ADMIN — GABAPENTIN 100 MG: 100 CAPSULE ORAL at 18:33

## 2019-08-23 RX ADMIN — PREDNISONE 20 MG: 20 TABLET ORAL at 08:30

## 2019-08-23 RX ADMIN — LUBIPROSTONE 8 MCG: 8 CAPSULE, GELATIN COATED ORAL at 20:35

## 2019-08-23 RX ADMIN — LORAZEPAM 0.5 MG: 0.5 TABLET ORAL at 14:33

## 2019-08-23 RX ADMIN — DOCUSATE SODIUM 100 MG: 100 CAPSULE, LIQUID FILLED ORAL at 08:59

## 2019-08-23 RX ADMIN — IPRATROPIUM BROMIDE AND ALBUTEROL SULFATE 3 ML: 2.5; .5 SOLUTION RESPIRATORY (INHALATION) at 21:58

## 2019-08-23 RX ADMIN — SODIUM CHLORIDE, PRESERVATIVE FREE 10 ML: 5 INJECTION INTRAVENOUS at 20:35

## 2019-08-24 PROCEDURE — 25010000002 MORPHINE PER 10 MG: Performed by: INTERNAL MEDICINE

## 2019-08-24 PROCEDURE — 94799 UNLISTED PULMONARY SVC/PX: CPT

## 2019-08-24 PROCEDURE — 99233 SBSQ HOSP IP/OBS HIGH 50: CPT | Performed by: INTERNAL MEDICINE

## 2019-08-24 PROCEDURE — 63710000001 PREDNISONE PER 1 MG: Performed by: HOSPITALIST

## 2019-08-24 PROCEDURE — 25810000003 SODIUM CHLORIDE 0.9 % WITH KCL 20 MEQ 20-0.9 MEQ/L-% SOLUTION: Performed by: HOSPITALIST

## 2019-08-24 RX ORDER — MORPHINE SULFATE 20 MG/ML
10 SOLUTION ORAL 2 TIMES DAILY PRN
Status: DISCONTINUED | OUTPATIENT
Start: 2019-08-24 | End: 2019-08-25

## 2019-08-24 RX ORDER — MORPHINE SULFATE 20 MG/ML
5 SOLUTION ORAL
Status: DISCONTINUED | OUTPATIENT
Start: 2019-08-24 | End: 2019-08-25

## 2019-08-24 RX ADMIN — SENNOSIDES 17.2 MG: 8.6 TABLET, FILM COATED ORAL at 08:57

## 2019-08-24 RX ADMIN — DOCUSATE SODIUM 100 MG: 100 CAPSULE, LIQUID FILLED ORAL at 20:35

## 2019-08-24 RX ADMIN — SENNOSIDES 17.2 MG: 8.6 TABLET, FILM COATED ORAL at 20:35

## 2019-08-24 RX ADMIN — MORPHINE SULFATE 30 MG: 30 TABLET, EXTENDED RELEASE ORAL at 06:00

## 2019-08-24 RX ADMIN — GABAPENTIN 100 MG: 100 CAPSULE ORAL at 06:01

## 2019-08-24 RX ADMIN — PREDNISONE 20 MG: 20 TABLET ORAL at 08:57

## 2019-08-24 RX ADMIN — GABAPENTIN 100 MG: 100 CAPSULE ORAL at 18:28

## 2019-08-24 RX ADMIN — VITAMIN D, TAB 1000IU (100/BT) 1000 UNITS: 25 TAB at 08:57

## 2019-08-24 RX ADMIN — LORAZEPAM 0.5 MG: 0.5 TABLET ORAL at 06:00

## 2019-08-24 RX ADMIN — LORAZEPAM 0.5 MG: 0.5 TABLET ORAL at 11:33

## 2019-08-24 RX ADMIN — IPRATROPIUM BROMIDE AND ALBUTEROL SULFATE 3 ML: 2.5; .5 SOLUTION RESPIRATORY (INHALATION) at 09:02

## 2019-08-24 RX ADMIN — LUBIPROSTONE 8 MCG: 8 CAPSULE, GELATIN COATED ORAL at 18:28

## 2019-08-24 RX ADMIN — MORPHINE SULFATE 2 MG: 2 INJECTION, SOLUTION INTRAMUSCULAR; INTRAVENOUS at 06:41

## 2019-08-24 RX ADMIN — IPRATROPIUM BROMIDE AND ALBUTEROL SULFATE 3 ML: 2.5; .5 SOLUTION RESPIRATORY (INHALATION) at 20:56

## 2019-08-24 RX ADMIN — IPRATROPIUM BROMIDE AND ALBUTEROL SULFATE 3 ML: 2.5; .5 SOLUTION RESPIRATORY (INHALATION) at 16:51

## 2019-08-24 RX ADMIN — POTASSIUM CHLORIDE AND SODIUM CHLORIDE 75 ML/HR: 900; 150 INJECTION, SOLUTION INTRAVENOUS at 21:11

## 2019-08-24 RX ADMIN — IPRATROPIUM BROMIDE AND ALBUTEROL SULFATE 3 ML: 2.5; .5 SOLUTION RESPIRATORY (INHALATION) at 12:26

## 2019-08-24 RX ADMIN — DOCUSATE SODIUM 100 MG: 100 CAPSULE, LIQUID FILLED ORAL at 08:57

## 2019-08-24 RX ADMIN — MORPHINE SULFATE 30 MG: 30 TABLET, EXTENDED RELEASE ORAL at 18:28

## 2019-08-24 RX ADMIN — LIDOCAINE 1 PATCH: 50 PATCH TOPICAL at 14:35

## 2019-08-24 RX ADMIN — MORPHINE SULFATE 5 MG: 100 SOLUTION ORAL at 21:11

## 2019-08-24 RX ADMIN — CETIRIZINE HYDROCHLORIDE 10 MG: 10 TABLET, FILM COATED ORAL at 08:57

## 2019-08-24 RX ADMIN — LORAZEPAM 0.5 MG: 0.5 TABLET ORAL at 18:32

## 2019-08-24 RX ADMIN — MIRTAZAPINE 15 MG: 15 TABLET, FILM COATED ORAL at 21:11

## 2019-08-24 RX ADMIN — MORPHINE SULFATE 5 MG: 100 SOLUTION ORAL at 14:36

## 2019-08-25 PROCEDURE — 99231 SBSQ HOSP IP/OBS SF/LOW 25: CPT | Performed by: INTERNAL MEDICINE

## 2019-08-25 PROCEDURE — 25010000002 MORPHINE PER 10 MG: Performed by: INTERNAL MEDICINE

## 2019-08-25 PROCEDURE — 25010000002 ONDANSETRON PER 1 MG: Performed by: HOSPITALIST

## 2019-08-25 PROCEDURE — 25010000002 LORAZEPAM PER 2 MG: Performed by: INTERNAL MEDICINE

## 2019-08-25 PROCEDURE — 25810000003 SODIUM CHLORIDE 0.9 % WITH KCL 20 MEQ 20-0.9 MEQ/L-% SOLUTION: Performed by: HOSPITALIST

## 2019-08-25 PROCEDURE — 63710000001 PREDNISONE PER 1 MG: Performed by: HOSPITALIST

## 2019-08-25 PROCEDURE — 94799 UNLISTED PULMONARY SVC/PX: CPT

## 2019-08-25 PROCEDURE — 25010000002 HYDROMORPHONE 1 MG/ML SOLUTION: Performed by: HOSPITALIST

## 2019-08-25 PROCEDURE — 25010000002 HYDROMORPHONE PER 4 MG: Performed by: HOSPITALIST

## 2019-08-25 RX ORDER — HYDROMORPHONE HYDROCHLORIDE 1 MG/ML
0.5 INJECTION, SOLUTION INTRAMUSCULAR; INTRAVENOUS; SUBCUTANEOUS
Status: DISCONTINUED | OUTPATIENT
Start: 2019-08-25 | End: 2019-08-28

## 2019-08-25 RX ORDER — HYDROMORPHONE HYDROCHLORIDE 1 MG/ML
0.5 INJECTION, SOLUTION INTRAMUSCULAR; INTRAVENOUS; SUBCUTANEOUS EVERY 4 HOURS PRN
Status: DISCONTINUED | OUTPATIENT
Start: 2019-08-25 | End: 2019-08-25

## 2019-08-25 RX ORDER — MORPHINE SULFATE 20 MG/ML
10 SOLUTION ORAL
Status: DISCONTINUED | OUTPATIENT
Start: 2019-08-25 | End: 2019-08-26

## 2019-08-25 RX ORDER — MORPHINE SULFATE 20 MG/ML
5 SOLUTION ORAL
Status: DISCONTINUED | OUTPATIENT
Start: 2019-08-25 | End: 2019-08-26

## 2019-08-25 RX ADMIN — SENNOSIDES 17.2 MG: 8.6 TABLET, FILM COATED ORAL at 09:23

## 2019-08-25 RX ADMIN — POTASSIUM CHLORIDE AND SODIUM CHLORIDE 75 ML/HR: 900; 150 INJECTION, SOLUTION INTRAVENOUS at 22:14

## 2019-08-25 RX ADMIN — VITAMIN D, TAB 1000IU (100/BT) 1000 UNITS: 25 TAB at 09:23

## 2019-08-25 RX ADMIN — HYDROMORPHONE HYDROCHLORIDE 1 MG: 1 INJECTION, SOLUTION INTRAMUSCULAR; INTRAVENOUS; SUBCUTANEOUS at 10:17

## 2019-08-25 RX ADMIN — CETIRIZINE HYDROCHLORIDE 10 MG: 10 TABLET, FILM COATED ORAL at 09:23

## 2019-08-25 RX ADMIN — IPRATROPIUM BROMIDE AND ALBUTEROL SULFATE 3 ML: 2.5; .5 SOLUTION RESPIRATORY (INHALATION) at 21:06

## 2019-08-25 RX ADMIN — LIDOCAINE 1 PATCH: 50 PATCH TOPICAL at 09:21

## 2019-08-25 RX ADMIN — LORAZEPAM 1 MG: 2 INJECTION INTRAMUSCULAR; INTRAVENOUS at 12:23

## 2019-08-25 RX ADMIN — IPRATROPIUM BROMIDE AND ALBUTEROL SULFATE 3 ML: 2.5; .5 SOLUTION RESPIRATORY (INHALATION) at 08:37

## 2019-08-25 RX ADMIN — MORPHINE SULFATE 2 MG: 2 INJECTION, SOLUTION INTRAMUSCULAR; INTRAVENOUS at 22:39

## 2019-08-25 RX ADMIN — GABAPENTIN 100 MG: 100 CAPSULE ORAL at 18:23

## 2019-08-25 RX ADMIN — HYDROMORPHONE HYDROCHLORIDE 0.5 MG: 1 INJECTION, SOLUTION INTRAMUSCULAR; INTRAVENOUS; SUBCUTANEOUS at 08:53

## 2019-08-25 RX ADMIN — IPRATROPIUM BROMIDE AND ALBUTEROL SULFATE 3 ML: 2.5; .5 SOLUTION RESPIRATORY (INHALATION) at 12:38

## 2019-08-25 RX ADMIN — LUBIPROSTONE 8 MCG: 8 CAPSULE, GELATIN COATED ORAL at 18:23

## 2019-08-25 RX ADMIN — MORPHINE SULFATE 30 MG: 30 TABLET, EXTENDED RELEASE ORAL at 06:42

## 2019-08-25 RX ADMIN — MIRTAZAPINE 15 MG: 15 TABLET, FILM COATED ORAL at 22:09

## 2019-08-25 RX ADMIN — MORPHINE SULFATE 30 MG: 30 TABLET, EXTENDED RELEASE ORAL at 18:23

## 2019-08-25 RX ADMIN — GABAPENTIN 100 MG: 100 CAPSULE ORAL at 06:43

## 2019-08-25 RX ADMIN — MORPHINE SULFATE 5 MG: 100 SOLUTION ORAL at 07:47

## 2019-08-25 RX ADMIN — PREDNISONE 20 MG: 20 TABLET ORAL at 09:22

## 2019-08-25 RX ADMIN — MORPHINE SULFATE 5 MG: 100 SOLUTION ORAL at 05:34

## 2019-08-25 RX ADMIN — DOCUSATE SODIUM 100 MG: 100 CAPSULE, LIQUID FILLED ORAL at 09:23

## 2019-08-25 RX ADMIN — IPRATROPIUM BROMIDE AND ALBUTEROL SULFATE 3 ML: 2.5; .5 SOLUTION RESPIRATORY (INHALATION) at 16:41

## 2019-08-25 RX ADMIN — LORAZEPAM 0.5 MG: 0.5 TABLET ORAL at 07:47

## 2019-08-25 RX ADMIN — ONDANSETRON 4 MG: 2 INJECTION INTRAMUSCULAR; INTRAVENOUS at 10:22

## 2019-08-25 RX ADMIN — LORAZEPAM 1 MG: 2 INJECTION INTRAMUSCULAR; INTRAVENOUS at 19:44

## 2019-08-26 PROCEDURE — 94799 UNLISTED PULMONARY SVC/PX: CPT

## 2019-08-26 PROCEDURE — 63710000001 PREDNISONE PER 1 MG: Performed by: HOSPITALIST

## 2019-08-26 PROCEDURE — 25010000002 ONDANSETRON PER 1 MG: Performed by: HOSPITALIST

## 2019-08-26 PROCEDURE — 99233 SBSQ HOSP IP/OBS HIGH 50: CPT | Performed by: INTERNAL MEDICINE

## 2019-08-26 PROCEDURE — 25010000002 HYDROMORPHONE PER 4 MG: Performed by: INTERNAL MEDICINE

## 2019-08-26 PROCEDURE — 25810000003 SODIUM CHLORIDE 0.9 % WITH KCL 20 MEQ 20-0.9 MEQ/L-% SOLUTION: Performed by: HOSPITALIST

## 2019-08-26 PROCEDURE — 25010000002 LORAZEPAM PER 2 MG: Performed by: INTERNAL MEDICINE

## 2019-08-26 PROCEDURE — 25010000002 HYDROMORPHONE PER 4 MG: Performed by: HOSPITALIST

## 2019-08-26 RX ORDER — LORAZEPAM 1 MG/1
1 TABLET ORAL 3 TIMES DAILY
Status: DISCONTINUED | OUTPATIENT
Start: 2019-08-26 | End: 2019-08-28 | Stop reason: HOSPADM

## 2019-08-26 RX ORDER — MORPHINE SULFATE 20 MG/ML
20 SOLUTION ORAL
Status: DISCONTINUED | OUTPATIENT
Start: 2019-08-26 | End: 2019-08-27

## 2019-08-26 RX ORDER — LORAZEPAM 2 MG/ML
0.5 INJECTION INTRAMUSCULAR
Status: DISCONTINUED | OUTPATIENT
Start: 2019-08-26 | End: 2019-08-28 | Stop reason: HOSPADM

## 2019-08-26 RX ORDER — HYDROMORPHONE HYDROCHLORIDE 1 MG/ML
0.5 INJECTION, SOLUTION INTRAMUSCULAR; INTRAVENOUS; SUBCUTANEOUS
Status: DISCONTINUED | OUTPATIENT
Start: 2019-08-26 | End: 2019-08-27

## 2019-08-26 RX ORDER — MORPHINE SULFATE 20 MG/ML
10 SOLUTION ORAL
Status: DISCONTINUED | OUTPATIENT
Start: 2019-08-26 | End: 2019-08-27

## 2019-08-26 RX ADMIN — HYDROMORPHONE HYDROCHLORIDE 0.5 MG: 1 INJECTION, SOLUTION INTRAMUSCULAR; INTRAVENOUS; SUBCUTANEOUS at 20:35

## 2019-08-26 RX ADMIN — CETIRIZINE HYDROCHLORIDE 10 MG: 10 TABLET, FILM COATED ORAL at 08:46

## 2019-08-26 RX ADMIN — IPRATROPIUM BROMIDE AND ALBUTEROL SULFATE 3 ML: 2.5; .5 SOLUTION RESPIRATORY (INHALATION) at 11:03

## 2019-08-26 RX ADMIN — LORAZEPAM 1 MG: 2 INJECTION INTRAMUSCULAR; INTRAVENOUS at 08:58

## 2019-08-26 RX ADMIN — LUBIPROSTONE 8 MCG: 8 CAPSULE, GELATIN COATED ORAL at 17:49

## 2019-08-26 RX ADMIN — MORPHINE SULFATE 10 MG: 100 SOLUTION ORAL at 22:12

## 2019-08-26 RX ADMIN — GABAPENTIN 100 MG: 100 CAPSULE ORAL at 06:28

## 2019-08-26 RX ADMIN — IPRATROPIUM BROMIDE AND ALBUTEROL SULFATE 3 ML: 2.5; .5 SOLUTION RESPIRATORY (INHALATION) at 15:39

## 2019-08-26 RX ADMIN — ONDANSETRON 4 MG: 2 INJECTION INTRAMUSCULAR; INTRAVENOUS at 20:42

## 2019-08-26 RX ADMIN — LIDOCAINE 1 PATCH: 50 PATCH TOPICAL at 08:46

## 2019-08-26 RX ADMIN — GABAPENTIN 100 MG: 100 CAPSULE ORAL at 18:28

## 2019-08-26 RX ADMIN — MORPHINE SULFATE 30 MG: 30 TABLET, EXTENDED RELEASE ORAL at 06:28

## 2019-08-26 RX ADMIN — DOCUSATE SODIUM 100 MG: 100 CAPSULE, LIQUID FILLED ORAL at 22:11

## 2019-08-26 RX ADMIN — PREDNISONE 20 MG: 20 TABLET ORAL at 08:46

## 2019-08-26 RX ADMIN — SENNOSIDES 17.2 MG: 8.6 TABLET, FILM COATED ORAL at 22:11

## 2019-08-26 RX ADMIN — LORAZEPAM 1 MG: 1 TABLET ORAL at 22:11

## 2019-08-26 RX ADMIN — LORAZEPAM 0.5 MG: 2 INJECTION INTRAMUSCULAR; INTRAVENOUS at 20:35

## 2019-08-26 RX ADMIN — MORPHINE SULFATE 30 MG: 30 TABLET, EXTENDED RELEASE ORAL at 18:28

## 2019-08-26 RX ADMIN — VITAMIN D, TAB 1000IU (100/BT) 1000 UNITS: 25 TAB at 08:46

## 2019-08-26 RX ADMIN — LORAZEPAM 1 MG: 1 TABLET ORAL at 16:04

## 2019-08-26 RX ADMIN — IPRATROPIUM BROMIDE AND ALBUTEROL SULFATE 3 ML: 2.5; .5 SOLUTION RESPIRATORY (INHALATION) at 07:18

## 2019-08-26 RX ADMIN — POTASSIUM CHLORIDE AND SODIUM CHLORIDE 75 ML/HR: 900; 150 INJECTION, SOLUTION INTRAVENOUS at 22:12

## 2019-08-26 RX ADMIN — IPRATROPIUM BROMIDE AND ALBUTEROL SULFATE 3 ML: 2.5; .5 SOLUTION RESPIRATORY (INHALATION) at 19:59

## 2019-08-26 RX ADMIN — DOCUSATE SODIUM 100 MG: 100 CAPSULE, LIQUID FILLED ORAL at 08:46

## 2019-08-26 RX ADMIN — MIRTAZAPINE 15 MG: 15 TABLET, FILM COATED ORAL at 22:14

## 2019-08-26 RX ADMIN — SENNOSIDES 17.2 MG: 8.6 TABLET, FILM COATED ORAL at 08:46

## 2019-08-26 RX ADMIN — HYDROMORPHONE HYDROCHLORIDE 0.5 MG: 1 INJECTION, SOLUTION INTRAMUSCULAR; INTRAVENOUS; SUBCUTANEOUS at 22:42

## 2019-08-26 RX ADMIN — MORPHINE SULFATE 10 MG: 100 SOLUTION ORAL at 19:13

## 2019-08-27 ENCOUNTER — APPOINTMENT (OUTPATIENT)
Dept: ONCOLOGY | Facility: CLINIC | Age: 44
End: 2019-08-27

## 2019-08-27 ENCOUNTER — APPOINTMENT (OUTPATIENT)
Dept: LAB | Facility: HOSPITAL | Age: 44
End: 2019-08-27

## 2019-08-27 PROCEDURE — 25010000003 HYDROMORPHONE PER 4 MG: Performed by: INTERNAL MEDICINE

## 2019-08-27 PROCEDURE — 94799 UNLISTED PULMONARY SVC/PX: CPT

## 2019-08-27 PROCEDURE — 99233 SBSQ HOSP IP/OBS HIGH 50: CPT | Performed by: INTERNAL MEDICINE

## 2019-08-27 PROCEDURE — 25810000003 SODIUM CHLORIDE 0.9 % WITH KCL 20 MEQ 20-0.9 MEQ/L-% SOLUTION: Performed by: HOSPITALIST

## 2019-08-27 PROCEDURE — 25010000002 ONDANSETRON PER 1 MG: Performed by: HOSPITALIST

## 2019-08-27 PROCEDURE — 25010000002 HYDROMORPHONE PER 4 MG: Performed by: HOSPITALIST

## 2019-08-27 PROCEDURE — 63710000001 PREDNISONE PER 1 MG: Performed by: HOSPITALIST

## 2019-08-27 RX ORDER — MORPHINE SULFATE 30 MG/1
30 TABLET, FILM COATED, EXTENDED RELEASE ORAL EVERY 8 HOURS SCHEDULED
Status: DISCONTINUED | OUTPATIENT
Start: 2019-08-27 | End: 2019-08-28 | Stop reason: HOSPADM

## 2019-08-27 RX ORDER — MORPHINE SULFATE 30 MG/1
30 TABLET, FILM COATED, EXTENDED RELEASE ORAL EVERY 8 HOURS SCHEDULED
Status: DISCONTINUED | OUTPATIENT
Start: 2019-08-27 | End: 2019-08-27

## 2019-08-27 RX ORDER — SENNA AND DOCUSATE SODIUM 50; 8.6 MG/1; MG/1
2 TABLET, FILM COATED ORAL 2 TIMES DAILY
Status: DISCONTINUED | OUTPATIENT
Start: 2019-08-27 | End: 2019-08-28 | Stop reason: HOSPADM

## 2019-08-27 RX ORDER — MORPHINE SULFATE 20 MG/ML
20 SOLUTION ORAL
Status: DISCONTINUED | OUTPATIENT
Start: 2019-08-27 | End: 2019-08-27

## 2019-08-27 RX ORDER — LACTULOSE 10 G/15ML
20 SOLUTION ORAL NIGHTLY PRN
Status: DISCONTINUED | OUTPATIENT
Start: 2019-08-27 | End: 2019-08-28 | Stop reason: HOSPADM

## 2019-08-27 RX ORDER — LACTULOSE 10 G/15ML
20 SOLUTION ORAL DAILY
Status: DISCONTINUED | OUTPATIENT
Start: 2019-08-27 | End: 2019-08-28 | Stop reason: HOSPADM

## 2019-08-27 RX ORDER — DULOXETIN HYDROCHLORIDE 30 MG/1
30 CAPSULE, DELAYED RELEASE ORAL DAILY
Status: DISCONTINUED | OUTPATIENT
Start: 2019-08-27 | End: 2019-08-28 | Stop reason: HOSPADM

## 2019-08-27 RX ORDER — MORPHINE SULFATE 30 MG/1
30 TABLET, FILM COATED, EXTENDED RELEASE ORAL EVERY 12 HOURS SCHEDULED
Status: DISCONTINUED | OUTPATIENT
Start: 2019-08-27 | End: 2019-08-27

## 2019-08-27 RX ORDER — LORAZEPAM 1 MG/1
1 TABLET ORAL ONCE
Status: COMPLETED | OUTPATIENT
Start: 2019-08-27 | End: 2019-08-27

## 2019-08-27 RX ORDER — LORAZEPAM 0.5 MG/1
0.5 TABLET ORAL
Status: DISCONTINUED | OUTPATIENT
Start: 2019-08-27 | End: 2019-08-28 | Stop reason: HOSPADM

## 2019-08-27 RX ORDER — LIDOCAINE 50 MG/G
2 PATCH TOPICAL
Status: DISCONTINUED | OUTPATIENT
Start: 2019-08-28 | End: 2019-08-28 | Stop reason: HOSPADM

## 2019-08-27 RX ADMIN — MORPHINE SULFATE 30 MG: 30 TABLET, EXTENDED RELEASE ORAL at 07:33

## 2019-08-27 RX ADMIN — LACTULOSE 20 G: 20 SOLUTION ORAL at 22:09

## 2019-08-27 RX ADMIN — DOCUSATE SODIUM 100 MG: 100 CAPSULE, LIQUID FILLED ORAL at 09:00

## 2019-08-27 RX ADMIN — VITAMIN D, TAB 1000IU (100/BT) 1000 UNITS: 25 TAB at 09:00

## 2019-08-27 RX ADMIN — LORAZEPAM 1 MG: 1 TABLET ORAL at 18:05

## 2019-08-27 RX ADMIN — IPRATROPIUM BROMIDE AND ALBUTEROL SULFATE 3 ML: 2.5; .5 SOLUTION RESPIRATORY (INHALATION) at 07:16

## 2019-08-27 RX ADMIN — HYDROMORPHONE HYDROCHLORIDE 0.5 MG: 1 INJECTION, SOLUTION INTRAMUSCULAR; INTRAVENOUS; SUBCUTANEOUS at 10:09

## 2019-08-27 RX ADMIN — HYDROMORPHONE HYDROCHLORIDE: 10 INJECTION INTRAMUSCULAR; INTRAVENOUS; SUBCUTANEOUS at 21:28

## 2019-08-27 RX ADMIN — MORPHINE SULFATE 20 MG: 100 SOLUTION ORAL at 14:11

## 2019-08-27 RX ADMIN — LIDOCAINE 1 PATCH: 50 PATCH TOPICAL at 09:00

## 2019-08-27 RX ADMIN — MORPHINE SULFATE 30 MG: 30 TABLET, EXTENDED RELEASE ORAL at 21:59

## 2019-08-27 RX ADMIN — IPRATROPIUM BROMIDE AND ALBUTEROL SULFATE 3 ML: 2.5; .5 SOLUTION RESPIRATORY (INHALATION) at 10:45

## 2019-08-27 RX ADMIN — MORPHINE SULFATE 10 MG: 100 SOLUTION ORAL at 09:08

## 2019-08-27 RX ADMIN — GABAPENTIN 100 MG: 100 CAPSULE ORAL at 07:33

## 2019-08-27 RX ADMIN — SENNOSIDES AND DOCUSATE SODIUM 2 TABLET: 8.6; 5 TABLET ORAL at 22:00

## 2019-08-27 RX ADMIN — IPRATROPIUM BROMIDE AND ALBUTEROL SULFATE 3 ML: 2.5; .5 SOLUTION RESPIRATORY (INHALATION) at 15:14

## 2019-08-27 RX ADMIN — SODIUM CHLORIDE, PRESERVATIVE FREE 10 ML: 5 INJECTION INTRAVENOUS at 22:00

## 2019-08-27 RX ADMIN — SENNOSIDES 17.2 MG: 8.6 TABLET, FILM COATED ORAL at 09:00

## 2019-08-27 RX ADMIN — LORAZEPAM 1 MG: 1 TABLET ORAL at 09:00

## 2019-08-27 RX ADMIN — PREDNISONE 20 MG: 20 TABLET ORAL at 09:00

## 2019-08-27 RX ADMIN — HYDROMORPHONE HYDROCHLORIDE: 10 INJECTION INTRAMUSCULAR; INTRAVENOUS; SUBCUTANEOUS at 19:11

## 2019-08-27 RX ADMIN — IPRATROPIUM BROMIDE AND ALBUTEROL SULFATE 3 ML: 2.5; .5 SOLUTION RESPIRATORY (INHALATION) at 21:04

## 2019-08-27 RX ADMIN — MIRTAZAPINE 15 MG: 15 TABLET, FILM COATED ORAL at 22:00

## 2019-08-27 RX ADMIN — POTASSIUM CHLORIDE AND SODIUM CHLORIDE 75 ML/HR: 900; 150 INJECTION, SOLUTION INTRAVENOUS at 22:01

## 2019-08-27 RX ADMIN — CETIRIZINE HYDROCHLORIDE 10 MG: 10 TABLET, FILM COATED ORAL at 09:00

## 2019-08-27 RX ADMIN — LORAZEPAM 1 MG: 1 TABLET ORAL at 22:00

## 2019-08-27 RX ADMIN — LORAZEPAM 1 MG: 1 TABLET ORAL at 13:37

## 2019-08-27 RX ADMIN — GABAPENTIN 100 MG: 100 CAPSULE ORAL at 18:05

## 2019-08-27 RX ADMIN — LORAZEPAM 1 MG: 1 TABLET ORAL at 13:43

## 2019-08-27 RX ADMIN — DULOXETINE HYDROCHLORIDE 30 MG: 30 CAPSULE, DELAYED RELEASE ORAL at 19:43

## 2019-08-27 RX ADMIN — ONDANSETRON 4 MG: 2 INJECTION INTRAMUSCULAR; INTRAVENOUS at 09:28

## 2019-08-27 RX ADMIN — ONDANSETRON HYDROCHLORIDE 8 MG: 4 TABLET, FILM COATED ORAL at 19:42

## 2019-08-28 VITALS
RESPIRATION RATE: 20 BRPM | BODY MASS INDEX: 18.77 KG/M2 | WEIGHT: 116.8 LBS | DIASTOLIC BLOOD PRESSURE: 87 MMHG | HEIGHT: 66 IN | SYSTOLIC BLOOD PRESSURE: 113 MMHG | TEMPERATURE: 97.1 F | HEART RATE: 101 BPM | OXYGEN SATURATION: 93 %

## 2019-08-28 PROCEDURE — 63710000001 PREDNISONE PER 1 MG: Performed by: HOSPITALIST

## 2019-08-28 PROCEDURE — 99233 SBSQ HOSP IP/OBS HIGH 50: CPT | Performed by: INTERNAL MEDICINE

## 2019-08-28 PROCEDURE — 25010000002 HYDROMORPHONE PER 4 MG: Performed by: HOSPITALIST

## 2019-08-28 PROCEDURE — 25010000003 HYDROMORPHONE PER 4 MG: Performed by: INTERNAL MEDICINE

## 2019-08-28 PROCEDURE — 25010000002 HYDROMORPHONE 1 MG/ML SOLUTION: Performed by: HOSPITALIST

## 2019-08-28 PROCEDURE — 25010000002 ONDANSETRON PER 1 MG: Performed by: HOSPITALIST

## 2019-08-28 RX ORDER — NALOXONE HCL 0.4 MG/ML
0.1 VIAL (ML) INJECTION
Status: DISCONTINUED | OUTPATIENT
Start: 2019-08-28 | End: 2019-08-28 | Stop reason: HOSPADM

## 2019-08-28 RX ORDER — HYDROMORPHONE HYDROCHLORIDE 1 MG/ML
0.5 INJECTION, SOLUTION INTRAMUSCULAR; INTRAVENOUS; SUBCUTANEOUS
Status: DISCONTINUED | OUTPATIENT
Start: 2019-08-28 | End: 2019-08-28 | Stop reason: HOSPADM

## 2019-08-28 RX ORDER — HYDROMORPHONE HCL IN 0.9% NACL 10 MG/50ML
PATIENT CONTROLLED ANALGESIA SYRINGE INTRAVENOUS CONTINUOUS
Status: DISCONTINUED | OUTPATIENT
Start: 2019-08-28 | End: 2019-08-28 | Stop reason: HOSPADM

## 2019-08-28 RX ADMIN — VITAMIN D, TAB 1000IU (100/BT) 1000 UNITS: 25 TAB at 11:09

## 2019-08-28 RX ADMIN — LIDOCAINE 1 PATCH: 50 PATCH TOPICAL at 11:10

## 2019-08-28 RX ADMIN — LORAZEPAM 0.5 MG: 0.5 TABLET ORAL at 05:17

## 2019-08-28 RX ADMIN — HYDROMORPHONE HYDROCHLORIDE 1 MG: 1 INJECTION, SOLUTION INTRAMUSCULAR; INTRAVENOUS; SUBCUTANEOUS at 05:50

## 2019-08-28 RX ADMIN — ONDANSETRON HYDROCHLORIDE 8 MG: 4 TABLET, FILM COATED ORAL at 11:23

## 2019-08-28 RX ADMIN — DULOXETINE HYDROCHLORIDE 30 MG: 30 CAPSULE, DELAYED RELEASE ORAL at 11:09

## 2019-08-28 RX ADMIN — GABAPENTIN 100 MG: 100 CAPSULE ORAL at 06:46

## 2019-08-28 RX ADMIN — ONDANSETRON 4 MG: 2 INJECTION INTRAMUSCULAR; INTRAVENOUS at 05:54

## 2019-08-28 RX ADMIN — HYDROMORPHONE HYDROCHLORIDE 1 MG: 1 INJECTION, SOLUTION INTRAMUSCULAR; INTRAVENOUS; SUBCUTANEOUS at 06:46

## 2019-08-28 RX ADMIN — PREDNISONE 20 MG: 20 TABLET ORAL at 11:09

## 2019-08-28 RX ADMIN — HYDROMORPHONE HYDROCHLORIDE 0.5 MG: 1 INJECTION, SOLUTION INTRAMUSCULAR; INTRAVENOUS; SUBCUTANEOUS at 15:07

## 2019-08-28 RX ADMIN — HYDROMORPHONE HYDROCHLORIDE: 10 INJECTION INTRAMUSCULAR; INTRAVENOUS; SUBCUTANEOUS at 04:29

## 2019-08-28 RX ADMIN — SENNOSIDES AND DOCUSATE SODIUM 2 TABLET: 8.6; 5 TABLET ORAL at 11:09

## 2019-08-28 RX ADMIN — CETIRIZINE HYDROCHLORIDE 10 MG: 10 TABLET, FILM COATED ORAL at 11:08

## 2019-08-28 RX ADMIN — HYDROMORPHONE HYDROCHLORIDE: 10 INJECTION INTRAMUSCULAR; INTRAVENOUS; SUBCUTANEOUS at 08:13

## 2019-08-28 RX ADMIN — LORAZEPAM 0.5 MG: 0.5 TABLET ORAL at 11:23

## 2019-08-28 RX ADMIN — MORPHINE SULFATE 30 MG: 30 TABLET, EXTENDED RELEASE ORAL at 05:16

## 2019-08-28 RX ADMIN — HYDROMORPHONE HYDROCHLORIDE 1 MG: 1 INJECTION, SOLUTION INTRAMUSCULAR; INTRAVENOUS; SUBCUTANEOUS at 11:07

## 2019-08-28 RX ADMIN — LORAZEPAM 0.5 MG: 0.5 TABLET ORAL at 08:08

## 2019-08-28 RX ADMIN — HYDROMORPHONE HYDROCHLORIDE: 10 INJECTION INTRAMUSCULAR; INTRAVENOUS; SUBCUTANEOUS at 02:32

## 2019-08-28 RX ADMIN — HYDROMORPHONE HYDROCHLORIDE 1 MG: 1 INJECTION, SOLUTION INTRAMUSCULAR; INTRAVENOUS; SUBCUTANEOUS at 09:35

## 2022-08-25 NOTE — PROGRESS NOTES
"Daily progress note    Chief complaint  Doing same  No new complaints  Agreed to take IV steroids and Symbicort  Does not want to be moved to [place] today  Family at bedside    History of present illness  44-year-old white female with history of colorectal CA stage IV with mets to the lung and also have asthma presented to [facility] emergency room with shortness of breath for last 1 week with nonproductive cough.  Patient has been wheezing.  Patient denies any fever chills chest pain abdominal pain nausea vomiting diarrhea.  Patient evaluated in ER found to be acute asthma flare admit for management.     REVIEW OF SYSTEMS  Remarkable for shortness of breath    PHYSICAL EXAM  Blood pressure 105/68, pulse 104, temperature 97.7 °F (36.5 °C), temperature source Oral, resp. rate 18, height 167.6 cm (66\"), weight 51.3 kg (113 lb), SpO2 100 %, not currently breastfeeding.    GENERAL: well developed, well nourished in moderate distress and emotional  HENT: NCAT, neck supple, trachea midline  EYES: no scleral icterus, PERRL, normal conjunctiva  CV: Tachycardic, regular rate, no murmur, power port in right upper chest  RESPIRATORY: Moderate respiratory distress, diffuse wheezing, tachypnea  ABDOMEN: soft, non-tender, non-distended, bowel sounds present  MUSCULOSKELETAL: no gross deformity  NEURO: alert,  sensory and motor function of extremities grossly intact, speech clear, mental status normal/baseline  SKIN: warm, dry, no rash  PSYCH: she is anxious and emotional    LAB RESULTS  Lab Results (last 24 hours)     Procedure Component Value Units Date/Time    Basic Metabolic Panel [[id number]]  (Abnormal) Collected:  08/08/19 0414    Specimen:  Blood Updated:  08/08/19 0518     Glucose 142 mg/dL      BUN 9 mg/dL      Creatinine 0.53 mg/dL      Sodium 141 mmol/L      Potassium 4.3 mmol/L      Chloride 106 mmol/L      CO2 24.2 mmol/L      Calcium 9.1 mg/dL      eGFR Non African Amer 127 mL/min/1.73      " Keep your appointment Monday for follow up on your blood pressure.  Continue with healthy food choices, avoid processed foods and added salt, drink lots of water.  Avoid caffeine.      We will let you know the results of your blood work when we get it back.   BUN/Creatinine Ratio 17.0     Anion Gap 10.8 mmol/L     Narrative:       GFR Normal >60  Chronic Kidney Disease <60  Kidney Failure <15    CBC & Differential [[id number]] Collected:  08/08/19 0414    Specimen:  Blood Updated:  08/08/19 0452    Narrative:       The following orders were created for panel order CBC & Differential.  Procedure                               Abnormality         Status                     ---------                               -----------         ------                     CBC Auto Differential[[id number]]        Abnormal            Final result                 Please view results for these tests on the individual orders.    CBC Auto Differential [[id number]]  (Abnormal) Collected:  08/08/19 0414    Specimen:  Blood Updated:  08/08/19 0452     WBC 14.78 10*3/mm3      RBC 3.45 10*6/mm3      Hemoglobin 11.2 g/dL      Hematocrit 34.0 %      MCV 98.6 fL      MCH 32.5 pg      MCHC 32.9 g/dL      RDW 16.6 %      RDW-SD 58.6 fl      MPV 9.3 fL      Platelets 208 10*3/mm3      Neutrophil % 94.7 %      Lymphocyte % 3.0 %      Monocyte % 1.7 %      Eosinophil % 0.0 %      Basophil % 0.1 %      Immature Grans % 0.5 %      Neutrophils, Absolute 14.00 10*3/mm3      Lymphocytes, Absolute 0.44 10*3/mm3      Monocytes, Absolute 0.25 10*3/mm3      Eosinophils, Absolute 0.00 10*3/mm3      Basophils, Absolute 0.01 10*3/mm3      Immature Grans, Absolute 0.08 10*3/mm3      nRBC 0.0 /100 WBC         Imaging Results (last 24 hours)     ** No results found for the last 24 hours. **            Current Facility-Administered Medications:   •  budesonide-formoterol (SYMBICORT) 160-4.5 MCG/ACT inhaler 2 puff, 2 puff, Inhalation, BID - RT, James Loza MD  •  calcium-vitamin D 500-200 MG-UNIT tablet 1,000 mg, 1,000 mg, Oral, Daily, James Loza MD, 1,000 mg at 08/08/19 0854  •  cetirizine (zyrTEC) tablet 10 mg, 10 mg, Oral, Daily, James Loza MD, 10 mg at 08/08/19 0854  •  cholecalciferol (VITAMIN D3) tablet 2,000  Units, 2,000 Units, Oral, Daily, James Loza MD, 2,000 Units at 08/07/19 0803  •  docusate sodium (COLACE) capsule 100 mg, 100 mg, Oral, BID, James Loza MD, 100 mg at 08/08/19 0851  •  gabapentin (NEURONTIN) capsule 100 mg, 100 mg, Oral, BID, James Loza MD, 100 mg at 08/08/19 0730  •  guaiFENesin (MUCINEX) 12 hr tablet 600 mg, 600 mg, Oral, Q12H, James Loza MD, 600 mg at 08/08/19 0854  •  ipratropium-albuterol (DUO-NEB) nebulizer solution 3 mL, 3 mL, Nebulization, Q4H - RT, James Loza MD, 3 mL at 08/08/19 0701  •  ipratropium-albuterol (DUO-NEB) nebulizer solution 3 mL, 3 mL, Nebulization, Q6H PRN, Connie Christian MD, 3 mL at 08/06/19 2023  •  lidocaine (LIDODERM) 5 % 2 patch, 2 patch, Transdermal, Q24H, James Loza MD, 1 patch at 08/08/19 0851  •  LORazepam (ATIVAN) tablet 0.5 mg, 0.5 mg, Oral, Q6H PRN, Gloria Gilbert MD PhD, 0.5 mg at 08/08/19 0921  •  methylPREDNISolone sodium succinate (SOLU-Medrol) injection 60 mg, 60 mg, Intravenous, Q8H, Rosmery Velasquez MD, 60 mg at 08/08/19 0726  •  mirtazapine (REMERON) tablet 15 mg, 15 mg, Oral, Nightly, James Loza MD, 15 mg at 08/07/19 2116  •  Morphine (MS CONTIN) 12 hr tablet 15 mg, 15 mg, Oral, Q12H, James Loza MD, 15 mg at 08/08/19 0729  •  morphine concentrated solution solution 10 mg, 10 mg, Oral, Q2H PRN, 10 mg at 08/07/19 1623 **OR** morphine concentrated solution solution 20 mg, 20 mg, Oral, Q2H PRN, James Loza MD  •  ondansetron (ZOFRAN) tablet 8 mg, 8 mg, Oral, Q8H PRN, James Loza MD  •  pantoprazole (PROTONIX) EC tablet 40 mg, 40 mg, Oral, Q AM, James Loza MD, 40 mg at 08/08/19 0726  •  prochlorperazine (COMPAZINE) tablet 10 mg, 10 mg, Oral, Q6H PRN, James Loza MD  •  saccharomyces boulardii (FLORASTOR) capsule 250 mg, 250 mg, Oral, BID, James Loza MD, 250 mg at 08/08/19 0854  •  senna (SENOKOT) tablet 17.2 mg, 2 tablet, Oral, BID PRN, Gloria Gilbert MD PhD, 17.2 mg at 08/07/19 4079  •  [COMPLETED] Insert peripheral IV,  , , Once **AND** sodium chloride 0.9 % flush 10 mL, 10 mL, Intravenous, PRN, Dimitri Whitman MD  •  vitamin B-12 (CYANOCOBALAMIN) tablet 1,000 mcg, 1,000 mcg, Oral, Daily, Mildred Pandya MD, 1,000 mcg at 08/08/19 0854     ASSESSMENT  Acute exacerbation of asthma  Metastatic CA to the lung  Colorectal CA  Neuropathy  Chronic pain syndrome  Chronic anemia  Gastroesophageal reflux disease    PLAN  CPM  Nebulizer   Steroids   No need for bronchoscopy at this time  Continue home medications  Pulmonary and oncology to follow patient  Stress ulcer DVT prophylaxis  Supportive care  Follow closely further recommendation according to hospital course    MILDRED PANDYA MD

## (undated) DEVICE — EQUIPMENT COVER BAG TYPE 48” X 36” (122CM X 91CM): Brand: EQUIPMENT COVER BAG TYPE

## (undated) DEVICE — GLV SURG TRIUMPH CLASSIC PF LTX 8.5 STRL

## (undated) DEVICE — BONE BIOPSY DEVICE F05A BBD SIZE 3: Brand: MEDTRONIC REUSABLE INSTRUMENTS

## (undated) DEVICE — GLV SURG BIOGEL LTX PF 8 1/2

## (undated) DEVICE — CANN NASL CO2 TRULINK W/O2 A/

## (undated) DEVICE — KIT OCN003 OSTEOCOOL BONE ACCESS 8G 090: Brand: OSTEOCOOL™ BONE ACCESS KIT

## (undated) DEVICE — TUBING, SUCTION, 1/4" X 10', STRAIGHT: Brand: MEDLINE

## (undated) DEVICE — PK KYPHOPLASTY 40

## (undated) DEVICE — GOWN,PREVENTION PLUS,XLARGE,STERILE: Brand: MEDLINE

## (undated) DEVICE — APPL CHLORAPREP W/TINT 26ML ORNG

## (undated) DEVICE — GLV SURG BIOGEL LTX PF 8

## (undated) DEVICE — Device: Brand: DEFENDO AIR/WATER/SUCTION AND BIOPSY VALVE

## (undated) DEVICE — PROBE OCP207 OSTEOCOOL RF 17G 7MMX2: Brand: OSTEOCOOL™ RF ABLATION SYSTEM

## (undated) DEVICE — NDL SPINE 22G 31/2IN BLK

## (undated) DEVICE — 1010 S-DRAPE TOWEL DRAPE 10/BX: Brand: STERI-DRAPE™

## (undated) DEVICE — THE TORRENT IRRIGATION SCOPE CONNECTOR IS USED WITH THE TORRENT IRRIGATION TUBING TO PROVIDE IRRIGATION FLUIDS SUCH AS STERILE WATER DURING GASTROINTESTINAL ENDOSCOPIC PROCEDURES WHEN USED IN CONJUNCTION WITH AN IRRIGATION PUMP (OR ELECTROSURGICAL UNIT).: Brand: TORRENT

## (undated) DEVICE — BITEBLOCK OMNI BLOC

## (undated) DEVICE — MIXER A07A CEMENT

## (undated) DEVICE — BONE TAMP KIT KPX153PB FF X2 15/3 1 STP: Brand: KYPHOPAK™ TRAY

## (undated) DEVICE — ADHS SKIN DERMABOND TOP ADVANCED

## (undated) DEVICE — SPNG GZ WOVN 4X4IN 12PLY 10/BX STRL

## (undated) DEVICE — SOL ISO/ALC RUB 70PCT 4OZ

## (undated) DEVICE — FRCP BX RADJAW4 NDL 2.8 240CM LG OG BX40

## (undated) DEVICE — CONN TBG Y 5 IN 1 LF STRL

## (undated) DEVICE — GLV SURG SENSICARE PI LF PF 7.5 GRN STRL

## (undated) DEVICE — GLV SURG BIOGEL LTX PF 7 1/2

## (undated) DEVICE — SHLD ANGIO 2LAYR CIR FEN